# Patient Record
Sex: FEMALE | Race: BLACK OR AFRICAN AMERICAN | Employment: OTHER | URBAN - METROPOLITAN AREA
[De-identification: names, ages, dates, MRNs, and addresses within clinical notes are randomized per-mention and may not be internally consistent; named-entity substitution may affect disease eponyms.]

---

## 2017-06-07 ENCOUNTER — TELEPHONE (OUTPATIENT)
Dept: ORTHOPEDIC SURGERY | Facility: CLINIC | Age: 57
End: 2017-06-07

## 2017-06-12 ENCOUNTER — DOCUMENTATION ONLY (OUTPATIENT)
Dept: ORTHOPEDIC SURGERY | Facility: CLINIC | Age: 57
End: 2017-06-12
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
## 2019-01-24 ENCOUNTER — HOSPITAL ENCOUNTER (OUTPATIENT)
Dept: LAB | Age: 59
Discharge: HOME | End: 2019-01-24
Payer: MEDICARE

## 2019-01-24 ENCOUNTER — HOSPITAL ENCOUNTER (OUTPATIENT)
Dept: LAB | Age: 59
Discharge: HOME | End: 2019-01-24

## 2019-01-24 DIAGNOSIS — Z01.818 PRE-OP EVALUATION: ICD-10-CM

## 2019-01-24 LAB
ANION GAP SERPL CALC-SCNC: 8 MMOL/L (ref 3–18)
APTT PPP: 32 SEC (ref 23–36.4)
ATRIAL RATE: 96 BPM
BUN SERPL-MCNC: 22 MG/DL (ref 7–18)
BUN/CREAT SERPL: 17 (ref 12–20)
CALCIUM SERPL-MCNC: 9.7 MG/DL (ref 8.5–10.1)
CALCULATED P AXIS, ECG09: 60 DEGREES
CALCULATED R AXIS, ECG10: 1 DEGREES
CALCULATED T AXIS, ECG11: 17 DEGREES
CHLORIDE SERPL-SCNC: 103 MMOL/L (ref 100–108)
CO2 SERPL-SCNC: 27 MMOL/L (ref 21–32)
CREAT SERPL-MCNC: 1.27 MG/DL (ref 0.6–1.3)
DIAGNOSIS, 93000: NORMAL
ERYTHROCYTE [DISTWIDTH] IN BLOOD BY AUTOMATED COUNT: 13.5 % (ref 11.6–14.5)
GLUCOSE SERPL-MCNC: 86 MG/DL (ref 74–99)
HCT VFR BLD AUTO: 30.5 % (ref 35–45)
HGB BLD-MCNC: 9.7 G/DL (ref 12–16)
INR PPP: 1 (ref 0.8–1.2)
MCH RBC QN AUTO: 28.2 PG (ref 24–34)
MCHC RBC AUTO-ENTMCNC: 31.8 G/DL (ref 31–37)
MCV RBC AUTO: 88.7 FL (ref 74–97)
P-R INTERVAL, ECG05: 110 MS
PLATELET # BLD AUTO: 399 K/UL (ref 135–420)
PMV BLD AUTO: 11.6 FL (ref 9.2–11.8)
POTASSIUM SERPL-SCNC: 4.1 MMOL/L (ref 3.5–5.5)
PROTHROMBIN TIME: 12.9 SEC (ref 11.5–15.2)
Q-T INTERVAL, ECG07: 356 MS
QRS DURATION, ECG06: 84 MS
QTC CALCULATION (BEZET), ECG08: 449 MS
RBC # BLD AUTO: 3.44 M/UL (ref 4.2–5.3)
SODIUM SERPL-SCNC: 138 MMOL/L (ref 136–145)
VENTRICULAR RATE, ECG03: 96 BPM
WBC # BLD AUTO: 8.8 K/UL (ref 4.6–13.2)

## 2019-01-24 PROCEDURE — 85610 PROTHROMBIN TIME: CPT

## 2019-01-24 PROCEDURE — 93005 ELECTROCARDIOGRAM TRACING: CPT

## 2019-01-24 PROCEDURE — 36415 COLL VENOUS BLD VENIPUNCTURE: CPT

## 2019-01-24 PROCEDURE — 85730 THROMBOPLASTIN TIME PARTIAL: CPT

## 2019-01-24 PROCEDURE — 80048 BASIC METABOLIC PNL TOTAL CA: CPT

## 2019-01-24 PROCEDURE — 85027 COMPLETE CBC AUTOMATED: CPT

## 2019-01-25 RX ORDER — LANOLIN ALCOHOL/MO/W.PET/CERES
325 CREAM (GRAM) TOPICAL
COMMUNITY

## 2019-01-25 RX ORDER — HYDROCHLOROTHIAZIDE 25 MG/1
25 TABLET ORAL DAILY
COMMUNITY
End: 2022-08-19

## 2019-01-25 RX ORDER — AMLODIPINE BESYLATE 10 MG/1
10 TABLET ORAL DAILY
COMMUNITY

## 2019-01-25 RX ORDER — CYCLOBENZAPRINE HCL 10 MG
10 TABLET ORAL
COMMUNITY
End: 2022-10-20 | Stop reason: ALTCHOICE

## 2019-01-28 ENCOUNTER — ANESTHESIA EVENT (OUTPATIENT)
Dept: SURGERY | Age: 59
End: 2019-01-28
Payer: MEDICARE

## 2019-01-29 ENCOUNTER — ANESTHESIA (OUTPATIENT)
Dept: SURGERY | Age: 59
End: 2019-01-29
Payer: MEDICARE

## 2019-01-29 ENCOUNTER — APPOINTMENT (OUTPATIENT)
Dept: GENERAL RADIOLOGY | Age: 59
End: 2019-01-29
Attending: UROLOGY
Payer: MEDICARE

## 2019-01-29 ENCOUNTER — HOSPITAL ENCOUNTER (OUTPATIENT)
Age: 59
Setting detail: OBSERVATION
Discharge: HOME | End: 2019-01-30
Attending: UROLOGY | Admitting: UROLOGY
Payer: MEDICARE

## 2019-01-29 DIAGNOSIS — N20.0 KIDNEY STONE: Primary | ICD-10-CM

## 2019-01-29 PROCEDURE — 77030034696 HC CATH URETH FOL 2W BARD -A: Performed by: UROLOGY

## 2019-01-29 PROCEDURE — 74011250637 HC RX REV CODE- 250/637: Performed by: UROLOGY

## 2019-01-29 PROCEDURE — 77030032490 HC SLV COMPR SCD KNE COVD -B: Performed by: UROLOGY

## 2019-01-29 PROCEDURE — C2617 STENT, NON-COR, TEM W/O DEL: HCPCS | Performed by: UROLOGY

## 2019-01-29 PROCEDURE — 77030020268 HC MISC GENERAL SUPPLY: Performed by: UROLOGY

## 2019-01-29 PROCEDURE — 74011250636 HC RX REV CODE- 250/636: Performed by: UROLOGY

## 2019-01-29 PROCEDURE — 77030005537 HC CATH URETH BARD -A: Performed by: UROLOGY

## 2019-01-29 PROCEDURE — 77030012961 HC IRR KT CYSTO/TUR ICUM -A: Performed by: UROLOGY

## 2019-01-29 PROCEDURE — 74011250636 HC RX REV CODE- 250/636: Performed by: NURSE ANESTHETIST, CERTIFIED REGISTERED

## 2019-01-29 PROCEDURE — 74011636320 HC RX REV CODE- 636/320: Performed by: UROLOGY

## 2019-01-29 PROCEDURE — 99218 HC RM OBSERVATION: CPT

## 2019-01-29 PROCEDURE — 74011000250 HC RX REV CODE- 250: Performed by: NURSE ANESTHETIST, CERTIFIED REGISTERED

## 2019-01-29 PROCEDURE — 77030018836 HC SOL IRR NACL ICUM -A: Performed by: UROLOGY

## 2019-01-29 PROCEDURE — C1758 CATHETER, URETERAL: HCPCS | Performed by: UROLOGY

## 2019-01-29 PROCEDURE — 36415 COLL VENOUS BLD VENIPUNCTURE: CPT

## 2019-01-29 PROCEDURE — 76060000033 HC ANESTHESIA 1 TO 1.5 HR: Performed by: UROLOGY

## 2019-01-29 PROCEDURE — 76010000161 HC OR TIME 1 TO 1.5 HR INTENSV-TIER 1: Performed by: UROLOGY

## 2019-01-29 PROCEDURE — 74420 UROGRAPHY RTRGR +-KUB: CPT

## 2019-01-29 PROCEDURE — 74011250636 HC RX REV CODE- 250/636

## 2019-01-29 PROCEDURE — 77030020782 HC GWN BAIR PAWS FLX 3M -B: Performed by: UROLOGY

## 2019-01-29 PROCEDURE — C1894 INTRO/SHEATH, NON-LASER: HCPCS | Performed by: UROLOGY

## 2019-01-29 PROCEDURE — C1769 GUIDE WIRE: HCPCS | Performed by: UROLOGY

## 2019-01-29 PROCEDURE — 76210000006 HC OR PH I REC 0.5 TO 1 HR: Performed by: UROLOGY

## 2019-01-29 PROCEDURE — 77030006974 HC BSKT URET RTVR BSC -C: Performed by: UROLOGY

## 2019-01-29 PROCEDURE — 74011000250 HC RX REV CODE- 250

## 2019-01-29 PROCEDURE — 77030027138 HC INCENT SPIROMETER -A

## 2019-01-29 PROCEDURE — 82360 CALCULUS ASSAY QUANT: CPT

## 2019-01-29 DEVICE — URETERAL STENT
Type: IMPLANTABLE DEVICE | Site: URETER | Status: FUNCTIONAL
Brand: POLARIS™ ULTRA

## 2019-01-29 RX ORDER — MIDAZOLAM HYDROCHLORIDE 1 MG/ML
INJECTION, SOLUTION INTRAMUSCULAR; INTRAVENOUS AS NEEDED
Status: DISCONTINUED | OUTPATIENT
Start: 2019-01-29 | End: 2019-01-29 | Stop reason: HOSPADM

## 2019-01-29 RX ORDER — ACETAMINOPHEN 325 MG/1
650 TABLET ORAL
Status: DISCONTINUED | OUTPATIENT
Start: 2019-01-29 | End: 2019-01-30 | Stop reason: HOSPADM

## 2019-01-29 RX ORDER — ATORVASTATIN CALCIUM 20 MG/1
20 TABLET, FILM COATED ORAL
Status: DISCONTINUED | OUTPATIENT
Start: 2019-01-29 | End: 2019-01-30 | Stop reason: HOSPADM

## 2019-01-29 RX ORDER — PROMETHAZINE HYDROCHLORIDE 25 MG/ML
12.5 INJECTION, SOLUTION INTRAMUSCULAR; INTRAVENOUS AS NEEDED
Status: DISCONTINUED | OUTPATIENT
Start: 2019-01-29 | End: 2019-01-30 | Stop reason: HOSPADM

## 2019-01-29 RX ORDER — DIPHENHYDRAMINE HYDROCHLORIDE 50 MG/ML
12.5 INJECTION, SOLUTION INTRAMUSCULAR; INTRAVENOUS
Status: DISCONTINUED | OUTPATIENT
Start: 2019-01-29 | End: 2019-01-30 | Stop reason: HOSPADM

## 2019-01-29 RX ORDER — DEXAMETHASONE SODIUM PHOSPHATE 4 MG/ML
INJECTION, SOLUTION INTRA-ARTICULAR; INTRALESIONAL; INTRAMUSCULAR; INTRAVENOUS; SOFT TISSUE AS NEEDED
Status: DISCONTINUED | OUTPATIENT
Start: 2019-01-29 | End: 2019-01-29 | Stop reason: HOSPADM

## 2019-01-29 RX ORDER — LANOLIN ALCOHOL/MO/W.PET/CERES
325 CREAM (GRAM) TOPICAL
Status: DISCONTINUED | OUTPATIENT
Start: 2019-01-30 | End: 2019-01-30 | Stop reason: HOSPADM

## 2019-01-29 RX ORDER — SODIUM CHLORIDE 9 MG/ML
25 INJECTION, SOLUTION INTRAVENOUS CONTINUOUS
Status: DISCONTINUED | OUTPATIENT
Start: 2019-01-29 | End: 2019-01-30

## 2019-01-29 RX ORDER — ALBUTEROL SULFATE 0.83 MG/ML
2.5 SOLUTION RESPIRATORY (INHALATION) AS NEEDED
Status: DISCONTINUED | OUTPATIENT
Start: 2019-01-29 | End: 2019-01-30 | Stop reason: HOSPADM

## 2019-01-29 RX ORDER — FENTANYL CITRATE 50 UG/ML
INJECTION, SOLUTION INTRAMUSCULAR; INTRAVENOUS AS NEEDED
Status: DISCONTINUED | OUTPATIENT
Start: 2019-01-29 | End: 2019-01-29 | Stop reason: HOSPADM

## 2019-01-29 RX ORDER — CYCLOBENZAPRINE HCL 10 MG
10 TABLET ORAL
Status: DISCONTINUED | OUTPATIENT
Start: 2019-01-29 | End: 2019-01-30 | Stop reason: HOSPADM

## 2019-01-29 RX ORDER — SODIUM CHLORIDE 9 MG/ML
75 INJECTION, SOLUTION INTRAVENOUS CONTINUOUS
Status: DISCONTINUED | OUTPATIENT
Start: 2019-01-29 | End: 2019-01-29 | Stop reason: HOSPADM

## 2019-01-29 RX ORDER — AMLODIPINE BESYLATE 10 MG/1
10 TABLET ORAL DAILY
Status: DISCONTINUED | OUTPATIENT
Start: 2019-01-30 | End: 2019-01-30 | Stop reason: HOSPADM

## 2019-01-29 RX ORDER — AMOXICILLIN 250 MG
2 CAPSULE ORAL DAILY
Status: DISCONTINUED | OUTPATIENT
Start: 2019-01-30 | End: 2019-01-30 | Stop reason: HOSPADM

## 2019-01-29 RX ORDER — SUCCINYLCHOLINE CHLORIDE 20 MG/ML
INJECTION INTRAMUSCULAR; INTRAVENOUS AS NEEDED
Status: DISCONTINUED | OUTPATIENT
Start: 2019-01-29 | End: 2019-01-29 | Stop reason: HOSPADM

## 2019-01-29 RX ORDER — LIDOCAINE HYDROCHLORIDE 20 MG/ML
INJECTION, SOLUTION EPIDURAL; INFILTRATION; INTRACAUDAL; PERINEURAL AS NEEDED
Status: DISCONTINUED | OUTPATIENT
Start: 2019-01-29 | End: 2019-01-29 | Stop reason: HOSPADM

## 2019-01-29 RX ORDER — SODIUM CHLORIDE 0.9 % (FLUSH) 0.9 %
5-40 SYRINGE (ML) INJECTION EVERY 8 HOURS
Status: DISCONTINUED | OUTPATIENT
Start: 2019-01-29 | End: 2019-01-29 | Stop reason: HOSPADM

## 2019-01-29 RX ORDER — NALOXONE HYDROCHLORIDE 0.4 MG/ML
0.04 INJECTION, SOLUTION INTRAMUSCULAR; INTRAVENOUS; SUBCUTANEOUS AS NEEDED
Status: DISCONTINUED | OUTPATIENT
Start: 2019-01-29 | End: 2019-01-30 | Stop reason: HOSPADM

## 2019-01-29 RX ORDER — NITROFURANTOIN 25; 75 MG/1; MG/1
100 CAPSULE ORAL 2 TIMES DAILY
Qty: 14 CAP | Refills: 0 | Status: SHIPPED | OUTPATIENT
Start: 2019-01-29 | End: 2019-02-05

## 2019-01-29 RX ORDER — HYDROCHLOROTHIAZIDE 25 MG/1
25 TABLET ORAL DAILY
Status: DISCONTINUED | OUTPATIENT
Start: 2019-01-30 | End: 2019-01-30 | Stop reason: HOSPADM

## 2019-01-29 RX ORDER — ROCURONIUM BROMIDE 10 MG/ML
INJECTION, SOLUTION INTRAVENOUS AS NEEDED
Status: DISCONTINUED | OUTPATIENT
Start: 2019-01-29 | End: 2019-01-29 | Stop reason: HOSPADM

## 2019-01-29 RX ORDER — LORAZEPAM 2 MG/ML
1 INJECTION INTRAMUSCULAR
Status: DISCONTINUED | OUTPATIENT
Start: 2019-01-29 | End: 2019-01-30 | Stop reason: HOSPADM

## 2019-01-29 RX ORDER — SODIUM CHLORIDE 0.9 % (FLUSH) 0.9 %
5-40 SYRINGE (ML) INJECTION AS NEEDED
Status: DISCONTINUED | OUTPATIENT
Start: 2019-01-29 | End: 2019-01-30 | Stop reason: HOSPADM

## 2019-01-29 RX ORDER — ONDANSETRON 2 MG/ML
4 INJECTION INTRAMUSCULAR; INTRAVENOUS ONCE
Status: ACTIVE | OUTPATIENT
Start: 2019-01-29 | End: 2019-01-30

## 2019-01-29 RX ORDER — ONDANSETRON 2 MG/ML
4 INJECTION INTRAMUSCULAR; INTRAVENOUS
Status: DISCONTINUED | OUTPATIENT
Start: 2019-01-29 | End: 2019-01-30 | Stop reason: HOSPADM

## 2019-01-29 RX ORDER — SODIUM CHLORIDE 0.9 % (FLUSH) 0.9 %
5-40 SYRINGE (ML) INJECTION AS NEEDED
Status: DISCONTINUED | OUTPATIENT
Start: 2019-01-29 | End: 2019-01-29 | Stop reason: HOSPADM

## 2019-01-29 RX ORDER — HEPARIN SODIUM 5000 [USP'U]/ML
5000 INJECTION, SOLUTION INTRAVENOUS; SUBCUTANEOUS EVERY 8 HOURS
Status: DISCONTINUED | OUTPATIENT
Start: 2019-01-29 | End: 2019-01-30 | Stop reason: HOSPADM

## 2019-01-29 RX ORDER — ESMOLOL HYDROCHLORIDE 10 MG/ML
INJECTION INTRAVENOUS AS NEEDED
Status: DISCONTINUED | OUTPATIENT
Start: 2019-01-29 | End: 2019-01-29 | Stop reason: HOSPADM

## 2019-01-29 RX ORDER — OXYBUTYNIN CHLORIDE 5 MG/1
5 TABLET ORAL
Status: DISCONTINUED | OUTPATIENT
Start: 2019-01-29 | End: 2019-01-30 | Stop reason: HOSPADM

## 2019-01-29 RX ORDER — GLYCOPYRROLATE 0.2 MG/ML
INJECTION INTRAMUSCULAR; INTRAVENOUS AS NEEDED
Status: DISCONTINUED | OUTPATIENT
Start: 2019-01-29 | End: 2019-01-29 | Stop reason: HOSPADM

## 2019-01-29 RX ORDER — PROPOFOL 10 MG/ML
INJECTION, EMULSION INTRAVENOUS AS NEEDED
Status: DISCONTINUED | OUTPATIENT
Start: 2019-01-29 | End: 2019-01-29 | Stop reason: HOSPADM

## 2019-01-29 RX ORDER — SODIUM CHLORIDE 0.9 % (FLUSH) 0.9 %
5-40 SYRINGE (ML) INJECTION EVERY 8 HOURS
Status: DISCONTINUED | OUTPATIENT
Start: 2019-01-29 | End: 2019-01-30 | Stop reason: HOSPADM

## 2019-01-29 RX ORDER — OXYCODONE AND ACETAMINOPHEN 5; 325 MG/1; MG/1
1 TABLET ORAL
Qty: 10 TAB | Refills: 0 | Status: SHIPPED | OUTPATIENT
Start: 2019-01-29 | End: 2022-08-19

## 2019-01-29 RX ORDER — ONDANSETRON 2 MG/ML
INJECTION INTRAMUSCULAR; INTRAVENOUS AS NEEDED
Status: DISCONTINUED | OUTPATIENT
Start: 2019-01-29 | End: 2019-01-29 | Stop reason: HOSPADM

## 2019-01-29 RX ORDER — HYDROMORPHONE HYDROCHLORIDE 2 MG/ML
0.5 INJECTION, SOLUTION INTRAMUSCULAR; INTRAVENOUS; SUBCUTANEOUS
Status: DISCONTINUED | OUTPATIENT
Start: 2019-01-29 | End: 2019-01-30 | Stop reason: HOSPADM

## 2019-01-29 RX ADMIN — FENTANYL CITRATE 100 MCG: 50 INJECTION, SOLUTION INTRAMUSCULAR; INTRAVENOUS at 15:00

## 2019-01-29 RX ADMIN — DEXAMETHASONE SODIUM PHOSPHATE 4 MG: 4 INJECTION, SOLUTION INTRA-ARTICULAR; INTRALESIONAL; INTRAMUSCULAR; INTRAVENOUS; SOFT TISSUE at 15:00

## 2019-01-29 RX ADMIN — PROPOFOL 200 MG: 10 INJECTION, EMULSION INTRAVENOUS at 15:00

## 2019-01-29 RX ADMIN — SUCCINYLCHOLINE CHLORIDE 160 MG: 20 INJECTION INTRAMUSCULAR; INTRAVENOUS at 15:00

## 2019-01-29 RX ADMIN — HEPARIN SODIUM 5000 UNITS: 5000 INJECTION INTRAVENOUS; SUBCUTANEOUS at 18:23

## 2019-01-29 RX ADMIN — HYDROMORPHONE HYDROCHLORIDE 0.5 MG: 2 INJECTION INTRAMUSCULAR; INTRAVENOUS; SUBCUTANEOUS at 18:14

## 2019-01-29 RX ADMIN — ATORVASTATIN CALCIUM 20 MG: 20 TABLET, FILM COATED ORAL at 20:26

## 2019-01-29 RX ADMIN — FENTANYL CITRATE 50 MCG: 50 INJECTION, SOLUTION INTRAMUSCULAR; INTRAVENOUS at 15:18

## 2019-01-29 RX ADMIN — ACETAMINOPHEN 650 MG: 325 TABLET ORAL at 22:42

## 2019-01-29 RX ADMIN — FENTANYL CITRATE 50 MCG: 50 INJECTION, SOLUTION INTRAMUSCULAR; INTRAVENOUS at 16:08

## 2019-01-29 RX ADMIN — ONDANSETRON 4 MG: 2 INJECTION INTRAMUSCULAR; INTRAVENOUS at 14:53

## 2019-01-29 RX ADMIN — MIDAZOLAM HYDROCHLORIDE 2 MG: 1 INJECTION, SOLUTION INTRAMUSCULAR; INTRAVENOUS at 14:53

## 2019-01-29 RX ADMIN — FAMOTIDINE 20 MG: 10 INJECTION INTRAVENOUS at 12:21

## 2019-01-29 RX ADMIN — ESMOLOL HYDROCHLORIDE 20 MG: 10 INJECTION INTRAVENOUS at 15:19

## 2019-01-29 RX ADMIN — SODIUM CHLORIDE 75 ML/HR: 900 INJECTION, SOLUTION INTRAVENOUS at 12:21

## 2019-01-29 RX ADMIN — ROCURONIUM BROMIDE 5 MG: 10 INJECTION, SOLUTION INTRAVENOUS at 15:00

## 2019-01-29 RX ADMIN — GLYCOPYRROLATE 0.2 MG: 0.2 INJECTION INTRAMUSCULAR; INTRAVENOUS at 14:53

## 2019-01-29 RX ADMIN — ROCURONIUM BROMIDE 15 MG: 10 INJECTION, SOLUTION INTRAVENOUS at 15:05

## 2019-01-29 RX ADMIN — FENTANYL CITRATE 50 MCG: 50 INJECTION, SOLUTION INTRAMUSCULAR; INTRAVENOUS at 15:49

## 2019-01-29 RX ADMIN — LIDOCAINE HYDROCHLORIDE 100 MG: 20 INJECTION, SOLUTION EPIDURAL; INFILTRATION; INTRACAUDAL; PERINEURAL at 15:00

## 2019-01-29 NOTE — DISCHARGE SUMMARY
Discharge Instructions      Patient: Mildred Barrera               Sex: female          DOA: 1/29/2019         YOB: 1960      Age:  62 y.o.        LOS:  LOS: 0 days     ACUTE DIAGNOSES:  Nephrolithiasis     CHRONIC MEDICAL DIAGNOSES:  Problem List as of 1/29/2019 Date Reviewed: 1/6/2019          Codes Class Noted - Resolved    Kidney stone ICD-10-CM: N20.0  ICD-9-CM: 592.0  1/29/2019 - Present        Nephrolithiasis ICD-10-CM: N20.0  ICD-9-CM: 592.0  1/29/2019 - Present        Right shoulder pain ICD-10-CM: M25.511  ICD-9-CM: 719.41  Unknown - Present        Benign hypertension ICD-10-CM: I10  ICD-9-CM: 401.1  10/24/2012 - Present        Pure hypercholesterolemia ICD-10-CM: E78.00  ICD-9-CM: 272.0  10/24/2012 - Present        Displacement 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 ICD-10-CM: M50.20  ICD-9-CM: 722.0  10/24/2012 - Present        Degeneration of lumbar or lumbosacral intervertebral disc ICD-10-CM: M51.37  ICD-9-CM: 722.52  10/24/2012 - Present        Depressive disorder, not elsewhere classified ICD-10-CM: F32.9  ICD-9-CM: 581  10/24/2012 - Present        Brachial neuritis or radiculitis NOS ICD-10-CM: M54.12  ICD-9-CM: 723.4  10/24/2012 - Present        Spondylosis of unspecified site without mention of myelopathy ICD-10-CM: M47.9  ICD-9-CM: 721.90  10/24/2012 - Present        Allergic rhinitis ICD-10-CM: J30.9  ICD-9-CM: 477.9  10/24/2012 - Present        Carpal tunnel syndrome ICD-10-CM: G56.00  ICD-9-CM: 354.0  10/24/2012 - Present        Headache(784.0) ICD-10-CM: R51  ICD-9-CM: 784.0  10/24/2012 - Present        Pain in soft tissues of limb ICD-10-CM: M79.609  ICD-9-CM: 729.5  10/24/2012 - Present        Lumbago ICD-10-CM: M54.5  ICD-9-CM: 724.2  10/24/2012 - Present        Allergy, unspecified not elsewhere classified ICD-10-CM: T78.40XA  ICD-9-CM: 995.3  10/24/2012 - Present        Insomnia, unspecified ICD-10-CM: G47.00  ICD-9-CM: 780.52  10/24/2012 - Present        Migraine, unspecified, without mention of intractable migraine without mention of status migrainosus ICD-10-CM: H13.216  ICD-9-CM: 346.90  10/24/2012 - Present        Osteoarthritis of leg ICD-10-CM: [id number]  ICD-9-CM: 715.96  10/24/2012 - Present    Overview Signed 10/24/2012  9:23 AM by Adonay Lang     left             Pain, joint, lower leg, left ICD-10-CM: M25.562  ICD-9-CM: 719.46  10/24/2012 - Present        Other tenosynovitis of hand and wrist ICD-10-CM: M65.849, M65.839  ICD-9-CM: 727.05  10/24/2012 - Present        Esophageal reflux ICD-10-CM: K21.9  ICD-9-CM: 530.81  10/24/2012 - Present        Unspecified vitamin D deficiency ICD-10-CM: E55.9  ICD-9-CM: 268.9  10/24/2012 - Present        Disorder of bone and cartilage, unspecified ICD-10-CM: M89.9, M94.9  ICD-9-CM: 733.90  10/24/2012 - Present        Thoracic or lumbosacral neuritis or radiculitis, unspecified ICD-10-CM: [id number]  ICD-9-CM: 724.4  10/24/2012 - Present              ACTIVITY: No restrictions     ADDITIONAL INFORMATION:   You have indwelling ureteral stents which frequently causes slight discomfort in the flank region and bladder spasms. You can take flomax as needed for flank discomfort or Ditropan for bladder spasms. The indwelling stent will need to be removed/exchanged as directed below. If the stent is left in place without appropriate follow-up, it may become encrusted with stone and/or infected. If that occurs, you will require multiple additional surgeries to fix this. It is very important you follow up for appropriate removal or exchange of ureteral stents. If you experience any fevers > 100.4, significant nausea/vomiting, or significant worsening of pain, please contact us at the number below. It is common to experience mild, recurrent blood in your urine and this is likely due to stent irritation. The general trend should be decreasing bleeding with occasional flares due to increased activity.   If the bleeding continues to worsen with passage of clots, you are unable to urinate, or you experience significant light-headedness, please contact us at the number below. FOLLOW UP CARE:  You have a return appt on Monday for ureteral stent removal by nursing staff.

## 2019-01-29 NOTE — PERIOP NOTES
TRANSFER - OUT REPORT:    Verbal report given to [facility] on Annabella Espinal  being transferred to [facility] for routine post - op       Report consisted of patients Situation, Background, Assessment and   Recommendations(SBAR). Information from the following report(s) SBAR, OR Summary, Procedure Summary, Intake/Output, MAR and Recent Results was reviewed with the receiving nurse. Lines:   Peripheral IV 01/29/19 Left;Posterior Hand (Active)   Site Assessment Clean, dry, & intact 1/29/2019 12:21 PM   Phlebitis Assessment 0 1/29/2019 12:21 PM   Infiltration Assessment 0 1/29/2019 12:21 PM   Dressing Status Clean, dry, & intact 1/29/2019 12:21 PM   Dressing Type Tape;Transparent 1/29/2019 12:21 PM   Hub Color/Line Status Pink; Infusing 1/29/2019 12:21 PM        Opportunity for questions and clarification was provided.       Patient transported with:   Registered Nurse

## 2019-01-29 NOTE — ANESTHESIA PREPROCEDURE EVALUATION
Anesthetic History No history of anesthetic complications Comments: Hx of cauda equina syndrome after spinal in Dec 2018 Review of Systems / Medical History Patient summary reviewed, nursing notes reviewed and pertinent labs reviewed Pulmonary Within defined limits Neuro/Psych Psychiatric history Cardiovascular Hypertension Exercise tolerance: >4 METS 
  
GI/Hepatic/Renal 
  
GERD Endo/Other Arthritis Other Findings Physical Exam 
 
Airway Mallampati: II 
TM Distance: 4 - 6 cm Neck ROM: normal range of motion Mouth opening: Normal 
 
 Cardiovascular Rhythm: regular Rate: normal 
 
 
 
 Dental 
No notable dental hx Pulmonary Breath sounds clear to auscultation Abdominal 
GI exam deferred Other Findings Anesthetic Plan ASA: 2 Anesthesia type: general 
 
 
 
 
Induction: Intravenous Anesthetic plan and risks discussed with: Patient

## 2019-01-29 NOTE — ANESTHESIA POSTPROCEDURE EVALUATION
Procedure(s): 
CYSTOSCOPY/RIGHT URETEROSCOPY/100W HOLMIUM LASER LITHOTRIPSY/STONE BASKET EXTRACTION/DOUBLE J STENT/C-ARM. Anesthesia Post Evaluation Multimodal analgesia: multimodal analgesia used between 6 hours prior to anesthesia start to PACU discharge Patient location during evaluation: bedside Patient participation: complete - patient participated Level of consciousness: awake Pain management: adequate Airway patency: patent Anesthetic complications: no 
Cardiovascular status: stable Respiratory status: acceptable Hydration status: acceptable Post anesthesia nausea and vomiting:  controlled Visit Vitals /80 Pulse (!) 102 Temp (P) 36.7 °C (98 °F) Resp 20 Ht 5' 4.5\" (1.638 m) Wt 85.7 kg (189 lb) SpO2 98% BMI 31.94 kg/m²

## 2019-01-29 NOTE — OP NOTES
Operative Note  Patient: Coy Weldon               Sex: female             MRN: [id number]  YOB: 1960      Age:  62 y.o. Preoperative Diagnosis: Kidney stones [N20.0]  Postoperative Diagnosis:  Kidney stones [N20.0]  Surgeon: Johanna Shirley     Indication: This is a 63 y/o woman with infected right stone s/p stent placement here today for ureteroscopic extraction. She has known cauda equina syndrome and chronic retention, currently managed with thurston catheter. Preop culture was treated appropriately. Procedure:    1) Cystoscopy  2) Retrograde pyelogram with interpretation  3) < 1 hour physician fluoroscopy imaging interpretation time  4) Right side Ureteroscopy, laser lithotripsy and stone extraction   5) JJ ureteral stent exchange      Findings:    1). Normal cystoscopy   2). Right Retrograde pyelogram without evidence of hydronephrosis or filling defects  3). Total stone burden 9mm   4). 7x24 JJ stent exchanged on string      Narrative of events: The patient was brought to the operative suite. Anesthesia was induced and preoperative antibiotics were administered. They were then placed in the dorsal lithotomy position and their external genitalia was prepped and draped in the usual fashion. A surgical timeout was performed confirming the patient's name, date of birth, laterality, and antibiotics. All were in agreement. A 22 Arabic cystourethroscope was then inserted into the patients bladder. The urethra revealed no abnormalities. Thorough cystoscopy was performed with both the 30 degree and 70 degree lens. The right  ureteral orifice had a stent which was grasped and brought out to the urethral meatus. This was cannulated with a 0.035 sensor tip wire and confirmed in the renal pelvis. A rigid scope was advanced into the ureter and the distal stone was identified and laser lithotripsy was performed. Basket extraction was then performed with removal of all visible stone. The ureter was completely cleared. A duel lumen was advanced over the wire and retrograde pyelogram was performed no evidence of hydronephrosis or filling defects. Second wire was advanced and duel lumen was removed. Ureteral access sheath was advanced to the UPJ under direct fluoroscopic guidance and flexible ureteroscopy was performed. Thorough pyeloscopy was again performed to ensure no residual fragments. The sheath was removed slowly and the entire ureter was well visualized without evidence of residual stones or injury. A stent was advanced over the wire and deployed using fluoroscopic technique with the string left in place. A thurston catheter was replaced and the patient was awoken from anesthesia. She tolerated the procedure well. Estimated Blood Loss: <5CC     Anesthesia:  General                  Implants:   Implant Name Type Inv. Item Serial No.  Lot No. LRB No. Used Action   STENT URET POLARIS 7FR Pablo Pollock - [id number]  Pop Shayne POLARIS 7FR 24CM -- [id number] [id number] Right 1 Implanted       Specimens:   ID Type Source Tests Collected by Time Destination   1 : Kidney stone analysis Fresh Kidney  Lan Beasley MD 1/29/2019  3:31 PM Pathology        Drains: 7x24 JJ stent on string            Complications:  None           Plan:   1. Observation until patient meeting criteria for discharge   2. Macrobid while stent is in place  3. Return Monday for nursing visit for stent removal  4.  Follow up with Dr. Stephanie Goddard for UDS and discussion of Cami Sherman MD  1/29/2019

## 2019-01-29 NOTE — INTERVAL H&P NOTE
H&P Update:  Kiesha HUFFMAN MedinaChanningt was seen and examined. History and physical has been reviewed. The patient has been examined.  There have been no significant clinical changes since the completion of the originally dated History and Physical.    Signed By: Diana Lopez MD     January 29, 2019 2:18 PM

## 2019-01-30 VITALS
BODY MASS INDEX: 31.49 KG/M2 | WEIGHT: 189 LBS | OXYGEN SATURATION: 100 % | SYSTOLIC BLOOD PRESSURE: 128 MMHG | HEIGHT: 65 IN | DIASTOLIC BLOOD PRESSURE: 83 MMHG | TEMPERATURE: 99.2 F | RESPIRATION RATE: 19 BRPM | HEART RATE: 95 BPM

## 2019-01-30 PROCEDURE — 99218 HC RM OBSERVATION: CPT

## 2019-01-30 PROCEDURE — 74011250637 HC RX REV CODE- 250/637: Performed by: UROLOGY

## 2019-01-30 PROCEDURE — 74011250636 HC RX REV CODE- 250/636: Performed by: UROLOGY

## 2019-01-30 RX ADMIN — HEPARIN SODIUM 5000 UNITS: 5000 INJECTION INTRAVENOUS; SUBCUTANEOUS at 08:36

## 2019-01-30 RX ADMIN — HYDROCHLOROTHIAZIDE 25 MG: 25 TABLET ORAL at 08:34

## 2019-01-30 RX ADMIN — SENNOSIDES AND DOCUSATE SODIUM 2 TABLET: 8.6; 5 TABLET ORAL at 08:34

## 2019-01-30 RX ADMIN — AMLODIPINE BESYLATE 10 MG: 10 TABLET ORAL at 08:34

## 2019-01-30 RX ADMIN — HEPARIN SODIUM 5000 UNITS: 5000 INJECTION INTRAVENOUS; SUBCUTANEOUS at 00:38

## 2019-01-30 RX ADMIN — FERROUS SULFATE TAB 325 MG (65 MG ELEMENTAL FE) 325 MG: 325 (65 FE) TAB at 08:34

## 2019-01-30 RX ADMIN — Medication 10 ML: at 08:36

## 2019-01-30 RX ADMIN — Medication 5 ML: at 00:42

## 2019-01-30 NOTE — PROGRESS NOTES
Reason for Admission:  Kidney stones [N20.0]                 RRAT Score:    7           Plan for utilizing home health:    Pt is active with [facility]. Will need resumption of care orders. Likelihood of Readmission:   LOW                         Transition of Care Plan:              Initial assessment completed with patient. Cognitive status of patient: oriented to time, place, person and situation. Face sheet information confirmed:  yes. The patient designates Jaquelin banks ([phone]) to participate in his/her discharge plan and to receive any needed information. This patient lives in a single family home with 2 steps to enter. Patient is able to navigate steps as needed. Prior to hospitalization, patient was considered to be independent with ADLs/IADLS : yes . Patient has a current ACP document on file: no  The  son will be available to transport patient home upon discharge. The patient already has Di [facility]  medical equipment available in the home. Patient is currently active with home health. If active, agency name is [facility]. Patient has not stayed in a skilled nursing facility or rehab. This patient is on dialysis :no    List of available Home Health agencies were provided and reviewed with the patient prior to discharge. Freedom of choice signed: yes, for [facility]. Will need orders to resume [place] care. Currently, the discharge plan is Home with  [facility]. The patient states that she can obtain her medications from the pharmacy, and take her medications as directed.     Patient's current insurance is [employer] Medicare part [place] [id number] Management Interventions  PCP Verified by CM: Yes(Per pt, saw PCP 2 weeks ago.)  Mode of Transport at Discharge: Self  Transition of Care Consult (CM Consult): Discharge Planning  MyChart Signup: No  Discharge Durable Medical Equipment: No  Physical Therapy Consult: No  Occupational Therapy Consult: No  Speech Therapy Consult: No  Current Support Network: Lives Alone  Confirm Follow Up Transport: Family  Plan discussed with Pt/Family/Caregiver: Yes  Freedom of Choice Offered: Yes  Discharge Location  Discharge Placement: Home with home health        NATALY Marie, RN  Pager # [phone]  Care Manager

## 2019-01-30 NOTE — PROGRESS NOTES
Observation notice provided in writing to patient and/or caregiver as well as verbal explanation of the policy. Patients who are in outpatient status also receive the Observation notice. Original document signed and place in chart.     NATALY Mathew, RN  Pager # [phone]  Care Manager

## 2019-01-30 NOTE — ROUTINE PROCESS
Patient is discharged stable with instructions thurston cath connected to leg bag.patient was accompanied by son.

## 2019-01-30 NOTE — ROUTINE PROCESS
SBAR/Bedside report including kardex,MAR and resent labs given to [address] [place] [address].

## 2019-01-30 NOTE — PROGRESS NOTES
Problem: Falls - Risk of  Goal: *Absence of Falls  Document Kareem Fall Risk and appropriate interventions in the flowsheet.   Outcome: Progressing Towards Goal  Fall Risk Interventions:  Mobility Interventions: Patient to call before getting OOB, Utilize walker, cane, or other assistive device         Medication Interventions: Patient to call before getting OOB, Teach patient to arise slowly    Elimination Interventions: Call light in reach, Patient to call for help with toileting needs

## 2019-01-30 NOTE — PROGRESS NOTES
conducted an initial consultation and Spiritual Assessment for Kiesha Espinal, who is a 62 y.o.,female. Patients Primary Language is: [place]. According to the patients EMR Gnosticism Affiliation is: Evangelical.     The reason the Patient came to the hospital is:   Patient Active Problem List    Diagnosis Date Noted    Kidney stone 01/29/2019    Nephrolithiasis 01/29/2019    Right shoulder pain     Benign hypertension 10/24/2012    Pure hypercholesterolemia 10/24/2012    Displacement 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 10/24/2012    Degeneration of lumbar or lumbosacral intervertebral disc 10/24/2012    Depressive disorder, not elsewhere classified 10/24/2012    Brachial neuritis or radiculitis NOS 10/24/2012    Spondylosis of unspecified site without mention of myelopathy 10/24/2012    Allergic rhinitis 10/24/2012    Carpal tunnel syndrome 10/24/2012    Headache(784.0) 10/24/2012    Pain in soft tissues of limb 10/24/2012    Lumbago 10/24/2012    Allergy, unspecified not elsewhere classified 10/24/2012    Insomnia, unspecified 10/24/2012    Migraine, unspecified, without mention of intractable migraine without mention of status migrainosus 10/24/2012    Osteoarthritis of leg 10/24/2012    Pain, joint, lower leg, left 10/24/2012    Other tenosynovitis of hand and wrist 10/24/2012    Esophageal reflux 10/24/2012    Unspecified vitamin D deficiency 10/24/2012    Disorder of bone and cartilage, unspecified 10/24/2012    Thoracic or lumbosacral neuritis or radiculitis, unspecified 10/24/2012        The  provided the following Interventions:  Initiated a relationship of care and support. Provided information about Spiritual Care Services. Chart reviewed. The following outcomes where achieved:  Patient expressed gratitude for 's visit.     Assessment:  Patient does not have any Druze/cultural needs that will affect patients preferences in health care. There are no spiritual or Yazidism issues which require intervention at this time. Plan:  Chaplains will continue to follow and will provide pastoral care on an as needed/requested basis.  recommends bedside caregivers page  on duty if patient shows signs of acute spiritual or emotional distress.     [address]  ([phone])

## 2019-01-30 NOTE — DISCHARGE INSTRUCTIONS
Patient Education        Kidney Stone: Care Instructions  Your Care Instructions    Kidney stones are formed when salts, minerals, and other substances normally found in the urine clump together. They can be as small as grains of sand or, rarely, as large as golf balls. While the stone is traveling through the ureter, which is the tube that carries urine from the kidney to the bladder, you will probably feel pain. The pain may be mild or very severe. You may also have some blood in your urine. As soon as the stone reaches the bladder, any intense pain should go away. If a stone is too large to pass on its own, you may need a medical procedure to help you pass the stone. The doctor has checked you carefully, but problems can develop later. If you notice any problems or new symptoms, get medical treatment right away. Follow-up care is a key part of your treatment and safety. Be sure to make and go to all appointments, and call your doctor if you are having problems. It's also a good idea to know your test results and keep a list of the medicines you take. How can you care for yourself at home? · Drink plenty of fluids, enough so that your urine is light yellow or clear like water. If you have kidney, heart, or liver disease and have to limit fluids, talk with your doctor before you increase the amount of fluids you drink. · Take pain medicines exactly as directed. Call your doctor if you think you are having a problem with your medicine. ? If the doctor gave you a prescription medicine for pain, take it as prescribed. ? If you are not taking a prescription pain medicine, ask your doctor if you can take an over-the-counter medicine. Read and follow all instructions on the label. · Your doctor may ask you to strain your urine so that you can collect your kidney stone when it passes. You can use a kitchen strainer or a tea strainer to catch the stone.  Store it in a plastic bag until you see your doctor again.  Preventing future kidney stones  Some changes in your diet may help prevent kidney stones. Depending on the cause of your stones, your doctor may recommend that you:  · Drink plenty of fluids, enough so that your urine is light yellow or clear like water. If you have kidney, heart, or liver disease and have to limit fluids, talk with your doctor before you increase the amount of fluids you drink. · Limit coffee, tea, and alcohol. Also avoid grapefruit juice. · Do not take more than the recommended daily dose of vitamins C and D.  · Avoid antacids such as Gaviscon, Maalox, Mylanta, or Tums. · Limit the amount of salt (sodium) in your diet. · Eat a balanced diet that is not too high in protein. · Limit foods that are high in a substance called oxalate, which can cause kidney stones. These foods include dark green vegetables, rhubarb, chocolate, wheat bran, nuts, cranberries, and beans. When should you call for help? Call your doctor now or seek immediate medical care if:    · You cannot keep down fluids.     · Your pain gets worse.     · You have a fever or chills.     · You have new or worse pain in your back just below your rib cage (the flank area).     · You have new or more blood in your urine.    Watch closely for changes in your health, and be sure to contact your doctor if:    · You do not get better as expected. Where can you learn more? Go to http://macey-evita.info/. Enter G329 in the search box to learn more about \"Kidney Stone: Care Instructions. \"  Current as of: March 14, 2018  Content Version: 11.9  © 2204-2708 "[employer]". Care instructions adapted under license by [employer] (which disclaims liability or warranty for this information).  If you have questions about a medical condition or this instruction, always ask your healthcare professional. [address] any warranty or liability for your use of this information. Patient Education        Learning About Diet for Kidney Stone Prevention  What are kidney stones? Kidney stones are made of salts and minerals in the urine that form small \"jemima. \" Stones can form in the kidneys and the ureters (the tubes that lead from the kidneys to the bladder). They can also form in the bladder. Stones may not cause a problem as long as they stay in the kidneys. But they can cause sudden, severe pain. Pain is most likely when the stones travel from the kidneys to the bladder. Kidney stones can cause bloody urine. Kidney stones often run in families. You are more likely to get them if you don't drink enough fluids, mainly water. Certain foods and drinks and some dietary supplements may also increase your risk for kidney stones if you consume too much of them. What can you do to prevent kidney stones? Changing what you eat may not prevent all types of kidney stones. But for people who have a history of certain kinds of kidney stones, some changes in diet may help. A dietitian can help you set up a meal plan that includes healthy, low-oxalate choices. Here are some general guidelines to get you started. Plan your meals and snacks around foods that are low in oxalate. These foods include:  · Corn, kale, parsnips, and squash,. · Beef, chicken, pork, turkey, and fish. · Milk, butter, cheese, and yogurt. You can eat certain foods that are medium-high in oxalate, but eat them only once in a while. These foods include:  · Bread. · Brown rice. · English muffins. · Figs. · Popcorn. · String beans. · Tomatoes. Limit very high-oxalate foods, including:  · Black tea. · Coffee. · Chocolate. · Dark green vegetables. · Nuts. Here are some other things you can do to help prevent kidney stones. · Drink plenty of fluids. If you have kidney, heart, or liver disease and have to limit fluids, talk with your doctor before you increase the amount of fluids you drink.   · Do not take more than the recommended daily dose of vitamins C and D.  · Limit the salt in your diet. · Eat a balanced diet that is not too high in protein. Follow-up care is a key part of your treatment and safety. Be sure to make and go to all appointments, and call your doctor if you are having problems. It's also a good idea to know your test results and keep a list of the medicines you take. Where can you learn more? Go to http://macey-evita.info/. Enter C138 in the search box to learn more about \"Learning About Diet for Kidney Stone Prevention. \"  Current as of: March 28, 2018  Content Version: 11.9  © 6311-6650 [employer]. Care instructions adapted under license by [email or web address] (which disclaims liability or warranty for this information). If you have questions about a medical condition or this instruction, always ask your healthcare professional. [place] [address] any warranty or liability for your use of this information. Patient Education        Lithotripsy: What to Expect at Home  Your Recovery    Lithotripsy is a way to treat kidney stones without surgery. It is also called extracorporeal shock wave lithotripsy, or ESWL. This treatment uses sound waves to break kidney stones into tiny pieces. These pieces can then pass out of the body in the urine. You may have a small amount of blood in your urine after this treatment. Your urine may be slightly pink or reddish. The blood in the urine often goes away after 2 days. You may have a plastic tube inside one of your ureters. Ureters are the tubes that connect the kidneys to the bladder. The plastic tube is called a stent. It takes urine from your kidney to your bladder. This lets the stone pass more easily. Your doctor may remove the stent in about a week or two. This care sheet gives you a general idea about how long it will take for you to recover.  But each person recovers at a different pace. Follow the steps below to feel better as quickly as possible. How can you care for yourself at home? Activity    · Rest as much as you need to after you go home.     · You may do your regular activities. But avoid hard exercise or sports for a week. Wait until there is no blood in your urine and the stent is out. Diet    · You can eat your normal diet.     · Drink plenty of fluids, enough so that your urine is light yellow or clear like water. If you have kidney, heart, or liver disease and have to limit fluids, talk with your doctor before you increase the amount of fluids you drink. Medicines    · Your doctor will tell you if and when you can restart your medicines. He or she will also give you instructions about taking any new medicines.     · If you take blood thinners, such as warfarin (Coumadin), clopidogrel (Plavix), or aspirin, be sure to talk to your doctor. He or she will tell you if and when to start taking those medicines again. Make sure that you understand exactly what your doctor wants you to do.     · Be safe with medicines. Read and follow all instructions on the label. ? If the doctor gave you a prescription medicine for pain, take it as prescribed. ? If you are not taking a prescription pain medicine, ask your doctor if you can take acetaminophen (Tylenol). Do not take ibuprofen (Advil, Motrin) or naproxen (Aleve), or similar medicines unless your doctor tells you to. ? Do not take two or more pain medicines at the same time unless the doctor told you to. Many pain medicines have acetaminophen, which is Tylenol. Too much acetaminophen (Tylenol) can be harmful. Other instructions    · Urinate through the strainer the doctor gives you. Save any stone pieces, including those that look like sand or gravel. Take these to your doctor. This will help your doctor find the cause of your stones. Follow-up care is a key part of your treatment and safety.  Be sure to make and go to all appointments, and call your doctor if you are having problems. It's also a good idea to know your test results and keep a list of the medicines you take. When should you call for help? Call 911 anytime you think you may need emergency care. For example, call if:    · You passed out (lost consciousness).     · You have chest pain, are short of breath, or cough up blood.    Call your doctor now or seek immediate medical care if:    · You have pain that does not get better after you take pain medicine.     · You have new or more blood clots in your urine. (It is normal for the urine to be pink for a few days.)     · You cannot urinate.     · You have symptoms of a urinary tract infection. These may include:  ? Pain or burning when you urinate. ? A frequent need to urinate without being able to pass much urine. ? Pain in the flank, which is just below the rib cage and above the waist on either side of the back. ? Blood in the urine. ? A fever.     · You are sick to your stomach or cannot drink fluids.     · You have signs of a blood clot in your leg (called a deep vein thrombosis), such as:  ? Pain in the calf, back of the knee, thigh, or groin. ? Redness and swelling in your leg.    Watch closely for any changes in your health, and be sure to contact your doctor if you have any problems. Where can you learn more? Go to http://macey-evita.info/. Enter W438 in the search box to learn more about \"Lithotripsy: What to Expect at Home. \"  Current as of: March 14, 2018  Content Version: 11.9  © 7960-3949 [employer]. Care instructions adapted under license by [employer] (which disclaims liability or warranty for this information). If you have questions about a medical condition or this instruction, always ask your healthcare professional. [address] any warranty or liability for your use of this information.              Discharge Instructions      Patient: Jose Alejandro Ibanez               Sex: female          DOA: 1/29/2019         YOB: 1960      Age:  62 y.o. ACUTE DIAGNOSES:  Nephrolithiasis     DIET: General     ACTIVITY: No restrictions     ADDITIONAL INFORMATION:   You have indwelling ureteral stents which frequently causes slight discomfort in the flank region and bladder spasms. If these occur beyond 24 hours after surgery, please contact our office to prescribe you flomax as needed for flank discomfort or Ditropan for bladder spasms. The indwelling stent will need to be removed/exchanged as directed below. If the stent is left in place without appropriate follow-up, it may become encrusted with stone and/or infected. If that occurs, you will require multiple additional surgeries to fix this. It is very important you follow up for appropriate removal or exchange of ureteral stents. If you experience any fevers > 100.4, significant nausea/vomiting, or significant worsening of pain, please contact us at the number below. It is common to experience mild, recurrent blood in your urine and this is likely due to stent irritation. If the bleeding continues to worsen with passage of clots, you are unable to urinate, or you experience significant light-headedness, please contact us at the number below. STENT: Your stents are attached to small strings coming out of your urethra. An appointment will be made for you to come to the office and have the stent removed with the assistance of a nurse. After the stent is removed it is common to experience some flank pain. This flank pain should resolve in 24-48 hours and should be manageable with pain medications. If pain is not controlled with pain medications, you experience nausea, vomiting, or fevers please notify us at the number below.       FOLLOW UP CARE:  You have a return appointment on Monday for stent removal by a nurse by pulling the string out.      Following that you have an appointment with Urodynamics, Ultrasound and visit with Dr. Yessenia Rodriguez.

## 2019-01-30 NOTE — DISCHARGE SUMMARY
Discharge Summary     Patient ID:  Sam Henderson  [id number]  39 y.o.  1960    Admit Date: 1/29/2019    Discharge Date: 1/30/2019    * Admission Diagnoses: Kidney stones [N20.0]    * Discharge Diagnoses:    Hospital Problems as of 1/30/2019 Date Reviewed: 1/6/2019          Codes Class Noted - Resolved POA    Kidney stone ICD-10-CM: N20.0  ICD-9-CM: 592.0  1/29/2019 - Present Unknown        Nephrolithiasis ICD-10-CM: N20.0  ICD-9-CM: 592.0  1/29/2019 - Present Unknown               Admission Condition: Good    * Discharge Condition: improved    * Procedures: Procedure(s):  CYSTOSCOPY/RIGHT URETEROSCOPY/100W HOLMIUM LASER LITHOTRIPSY/STONE BASKET EXTRACTION/DOUBLE J STENT/C-ARM    [facility] Course:   Normal hospital course for this procedure. Overnight Observation    Consults: None     Physical Examination:  Visit Vitals  /77 (BP 1 Location: Left arm, BP Patient Position: At rest)   Pulse 85   Temp 98.5 °F (36.9 °C)   Resp 17   Ht 5' 4.5\" (1.638 m)   Wt 189 lb (85.7 kg)   SpO2 97%   BMI 31.94 kg/m²      Constitutional: WDWN, Pleasant and appropriate affect, No acute distress. Head: Normocephalic and atraumatic. Eyes: No discharge. No scleral icterus. Neck: Normal range of motion. Neck supple. No JVD present. No tracheal deviation present. Pulmonary/Chest: Effort normal. No respiratory distress. ABD: Soft, mild left side discomfort   Musc:normal gait and station  Psych: normal affect and mood, well groomed, appropriate answers, A&Ox3  Skin: Skin is warm and dry. : No CVA Tenderness. Mcmullen draining clear yellow urine      Significant Diagnostic Studies:       Labs: Results:   Chemistry  No results for input(s): GLU, NA, K, CL, CO2, BUN, CREA, CA, AGAP, BUCR, TBIL, GPT, AP, TP, ALB, GLOB, AGRAT in the last 72 hours. CBC w/Diff No results for input(s): WBC, RBC, HGB, HCT, PLT, GRANS, LYMPH, EOS, HGBEXT, HCTEXT, PLTEXT in the last 72 hours.    Cultures No results for input(s): CULT in the last 72 hours. All Micro Results     None            Urinalysis Color   Date Value Ref Range Status   02/29/2016 YELLOW   Final     Appearance   Date Value Ref Range Status   02/29/2016 CLEAR   Final     Specific gravity   Date Value Ref Range Status   02/29/2016 1.020 1.005 - 1.030   Final     pH (UA)   Date Value Ref Range Status   02/29/2016 5.5 5.0 - 8.0   Final     Protein   Date Value Ref Range Status   02/29/2016 NEGATIVE  NEG mg/dL Final     Ketone   Date Value Ref Range Status   02/29/2016 NEGATIVE  NEG mg/dL Final     Bilirubin   Date Value Ref Range Status   02/29/2016 NEGATIVE  NEG   Final     Blood   Date Value Ref Range Status   02/29/2016 NEGATIVE  NEG   Final     Urobilinogen   Date Value Ref Range Status   02/29/2016 1.0 0.2 - 1.0 EU/dL Final     Nitrites   Date Value Ref Range Status   02/29/2016 NEGATIVE  NEG   Final     Leukocyte Esterase   Date Value Ref Range Status   02/29/2016 NEGATIVE  NEG   Final     Potassium   Date Value Ref Range Status   01/24/2019 4.1 3.5 - 5.5 mmol/L Final     Creatinine   Date Value Ref Range Status   01/24/2019 1.27 0.6 - 1.3 MG/DL Final     BUN   Date Value Ref Range Status   01/24/2019 22 (H) 7.0 - 18 MG/DL Final      PSA No results for input(s): PSA in the last 72 hours. Coagulation Lab Results   Component Value Date/Time    Prothrombin time 12.9 01/24/2019 04:03 PM    INR 1.0 01/24/2019 04:03 PM    aPTT 32.0 01/24/2019 04:03 PM           * Disposition: Home    Discharge Medications:   Current Discharge Medication List      START taking these medications    Details   nitrofurantoin, macrocrystal-monohydrate, (MACROBID) 100 mg capsule Take 1 Cap by mouth two (2) times a day for 7 days. Qty: 14 Cap, Refills: 0      !! oxyCODONE-acetaminophen (PERCOCET) 5-325 mg per tablet Take 1 Tab by mouth every four (4) hours as needed for Pain. Max Daily Amount: 6 Tabs.   Qty: 10 Tab, Refills: 0    Associated Diagnoses: Kidney stone       !! - Potential duplicate medications found. Please discuss with provider. CONTINUE these medications which have NOT CHANGED    Details   amLODIPine (NORVASC) 10 mg tablet Take 10 mg by mouth daily. hydroCHLOROthiazide (HYDRODIURIL) 25 mg tablet Take 25 mg by mouth daily. ferrous sulfate 325 mg (65 mg iron) tablet Take 325 mg by mouth Daily (before breakfast). cyclobenzaprine (FLEXERIL) 10 mg tablet Take 10 mg by mouth three (3) times daily as needed for Muscle Spasm(s). senna-docusate (PERICOLACE) 8.6-50 mg per tablet Take 2 Tabs by Mouth Every Morning. VITAMIN D2 50,000 unit capsule every seven (7) days. Refills: 3      !! oxyCODONE-acetaminophen (PERCOCET) 5-325 mg per tablet Take 1 Tab by mouth every four (4) hours as needed for Pain. atorvastatin (LIPITOR) 20 mg tablet Take  by mouth daily. !! - Potential duplicate medications found. Please discuss with provider. STOP taking these medications       levoFLOXacin (LEVAQUIN) 500 mg tablet Comments:   Reason for Stopping:               * Follow-up Care/Patient Instructions: Activity: Light duty  Diet: Resume previous diet  Wound Care: None needed  Wound care discussed with patient. Follow-up Information     Follow up With Specialties Details Why [address]  [phone]            PCP:  Maciel Andrew DO  Referring Provider: No ref. provider found    Follow up Sandi Ren DO in 7-14 days. Follow up with:Nurse Visit next week for stent removal and with Dr Danita Enciso as scheduled for Urodynamics and f/up to review.     SignedFoye TAMANNA Newberry    [phone]    January 30, 2019 9:52 AM

## 2019-01-30 NOTE — PROGRESS NOTES
Discharge  Planning    Discharge order noted for today. Referral sent to [facility] agency. Met with pt and  agreeable to the transition plan today. Transport has been arranged with son P'ts discharge [facility] orders have been forwarded to [place].  will continue to monitor for transitional needs for a safe discharge.      NATALY Zamudio, RN  Pager # [phone]  Care Manager

## 2019-02-01 LAB
CA PHOS MFR STONE: 3 %
CALCIUM OXALATE DIHYDRATE MFR STONE IR: 2 %
COLOR STONE: NORMAL
COM MFR STONE: 95 %
COMMENT, 519994: NORMAL
COMPOSITION, 120440: NORMAL
DISCLAIMER, STO32L: NORMAL
NIDUS STONE QL: NORMAL
SIZE STONE: NORMAL MM
WT STONE: 221.4 MG

## 2019-09-03 ENCOUNTER — DOCUMENTATION ONLY (OUTPATIENT)
Dept: SURGERY | Age: 59
End: 2019-09-03

## 2019-09-03 NOTE — PROGRESS NOTES
Patient came in office to find out about her revision packet. Told patient that we tried contacting her on 7/17 and was unable to LVM.  She has to see Dr. Alves Members per Dr. Reyes

## 2021-07-12 ENCOUNTER — OFFICE VISIT (OUTPATIENT)
Dept: ORTHOPEDIC SURGERY | Age: 61
End: 2021-07-12
Payer: MEDICARE

## 2021-07-12 VITALS
HEIGHT: 64 IN | OXYGEN SATURATION: 99 % | TEMPERATURE: 96.8 F | BODY MASS INDEX: 33.12 KG/M2 | WEIGHT: 194 LBS | HEART RATE: 80 BPM

## 2021-07-12 DIAGNOSIS — M25.512 CHRONIC LEFT SHOULDER PAIN: ICD-10-CM

## 2021-07-12 DIAGNOSIS — G89.29 CHRONIC LEFT SHOULDER PAIN: ICD-10-CM

## 2021-07-12 DIAGNOSIS — M75.102 NONTRAUMATIC TEAR OF LEFT ROTATOR CUFF, UNSPECIFIED TEAR EXTENT: Primary | ICD-10-CM

## 2021-07-12 PROCEDURE — 20611 DRAIN/INJ JOINT/BURSA W/US: CPT | Performed by: ORTHOPAEDIC SURGERY

## 2021-07-12 PROCEDURE — 99214 OFFICE O/P EST MOD 30 MIN: CPT | Performed by: ORTHOPAEDIC SURGERY

## 2021-07-12 PROCEDURE — G8427 DOCREV CUR MEDS BY ELIG CLIN: HCPCS | Performed by: ORTHOPAEDIC SURGERY

## 2021-07-12 PROCEDURE — 73030 X-RAY EXAM OF SHOULDER: CPT | Performed by: ORTHOPAEDIC SURGERY

## 2021-07-12 PROCEDURE — 3017F COLORECTAL CA SCREEN DOC REV: CPT | Performed by: ORTHOPAEDIC SURGERY

## 2021-07-12 PROCEDURE — G8756 NO BP MEASURE DOC: HCPCS | Performed by: ORTHOPAEDIC SURGERY

## 2021-07-12 PROCEDURE — G8419 CALC BMI OUT NRM PARAM NOF/U: HCPCS | Performed by: ORTHOPAEDIC SURGERY

## 2021-07-12 PROCEDURE — G9717 DOC PT DX DEP/BP F/U NT REQ: HCPCS | Performed by: ORTHOPAEDIC SURGERY

## 2021-07-12 RX ORDER — TRIAMCINOLONE ACETONIDE 40 MG/ML
40 INJECTION, SUSPENSION INTRA-ARTICULAR; INTRAMUSCULAR ONCE
Status: COMPLETED | OUTPATIENT
Start: 2021-07-12 | End: 2021-07-12

## 2021-07-12 RX ORDER — GABAPENTIN 300 MG/1
300 CAPSULE ORAL 3 TIMES DAILY
COMMUNITY

## 2021-07-12 RX ADMIN — TRIAMCINOLONE ACETONIDE 40 MG: 40 INJECTION, SUSPENSION INTRA-ARTICULAR; INTRAMUSCULAR at 14:23
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
## 2021-07-20 ENCOUNTER — OFFICE VISIT (OUTPATIENT)
Dept: ORTHOPEDIC SURGERY | Age: 61
End: 2021-07-20
Payer: MEDICARE

## 2021-07-20 VITALS
HEIGHT: 65 IN | RESPIRATION RATE: 16 BRPM | BODY MASS INDEX: 32.39 KG/M2 | TEMPERATURE: 97 F | WEIGHT: 194.4 LBS | HEART RATE: 83 BPM | OXYGEN SATURATION: 98 %

## 2021-07-20 DIAGNOSIS — M79.672 LEFT FOOT PAIN: ICD-10-CM

## 2021-07-20 DIAGNOSIS — M79.673 INTRACTABLE HEEL PAIN: ICD-10-CM

## 2021-07-20 DIAGNOSIS — S90.32XA CONTUSION OF FOOT OR HEEL, LEFT, INITIAL ENCOUNTER: ICD-10-CM

## 2021-07-20 DIAGNOSIS — S92.045A OTHER CLOSED NONDISPLACED FRACTURE OF TUBEROSITY OF LEFT CALCANEUS, INITIAL ENCOUNTER: Primary | ICD-10-CM

## 2021-07-20 PROCEDURE — G8419 CALC BMI OUT NRM PARAM NOF/U: HCPCS | Performed by: ORTHOPAEDIC SURGERY

## 2021-07-20 PROCEDURE — 3017F COLORECTAL CA SCREEN DOC REV: CPT | Performed by: ORTHOPAEDIC SURGERY

## 2021-07-20 PROCEDURE — 99213 OFFICE O/P EST LOW 20 MIN: CPT | Performed by: ORTHOPAEDIC SURGERY

## 2021-07-20 PROCEDURE — 73630 X-RAY EXAM OF FOOT: CPT | Performed by: ORTHOPAEDIC SURGERY

## 2021-07-20 PROCEDURE — 28400 CLTX CALCANEAL FX W/O MNPJ: CPT | Performed by: ORTHOPAEDIC SURGERY

## 2021-07-20 PROCEDURE — G8756 NO BP MEASURE DOC: HCPCS | Performed by: ORTHOPAEDIC SURGERY

## 2021-07-20 PROCEDURE — G8427 DOCREV CUR MEDS BY ELIG CLIN: HCPCS | Performed by: ORTHOPAEDIC SURGERY

## 2021-07-20 PROCEDURE — G9717 DOC PT DX DEP/BP F/U NT REQ: HCPCS | Performed by: ORTHOPAEDIC SURGERY

## 2021-07-20 RX ORDER — MELOXICAM 15 MG/1
15 TABLET ORAL DAILY
Qty: 30 TABLET | Refills: 1 | Status: SHIPPED | OUTPATIENT
Start: 2021-07-20 | End: 2022-08-19

## 2021-07-20 RX ORDER — FAMOTIDINE 40 MG/1
40 TABLET, FILM COATED ORAL DAILY
Qty: 30 TABLET | Refills: 1 | Status: SHIPPED | OUTPATIENT
Start: 2021-07-20 | End: 2021-08-12
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
## 2021-08-03 PROBLEM — N20.0 KIDNEY STONE: Status: RESOLVED | Noted: 2019-01-29 | Resolved: 2021-08-03

## 2021-08-05 NOTE — PROGRESS NOTES
Jyotsna Estimable Teddy  1960   Chief Complaint   Patient presents with    Shoulder Pain     left shoulder        HISTORY OF PRESENT ILLNESS  Kiesha Espinal is a 64 y.o. female who presents today for reevaluation of left shoulder pain. Patient rates pain as 3/10 today. At last OV on 7/12/2021, patient had a left shoulder intra-bursal cortisone injection which provided good relief. Notes she is feeling 80% better today. Pain has been present for at least 5-6 months. No specific injury. Pt is s/p R rotator cuff repair 3/21/16 .       Patient denies any fever, chills, chest pain, shortness of breath or calf pain. The remainder of the review of systems is negative. There are no new illness or injuries to report since last seen in the office. There are no changes to medications, allergies, family or social history. PHYSICAL EXAM:   Visit Vitals  Pulse 88   Resp 15   Ht 5' 4\" (1.626 m)   Wt 190 lb (86.2 kg)   SpO2 98%   BMI 32.61 kg/m²     The patient is a well-developed, well-nourished female   in no acute distress. The patient is alert and oriented times three. The patient is alert and oriented times three. Mood and affect are normal.  LYMPHATIC: lymph nodes are not enlarged and are within normal limits  SKIN: normal in color and non tender to palpation. There are no bruises or abrasions noted. NEUROLOGICAL: Motor sensory exam is within normal limits. Reflexes are equal bilaterally.  There is normal sensation to pinprick and light touch  MUSCULOSKELETAL:  Examination Left shoulder   Skin Intact   AC joint tenderness -   Biceps tenderness -   Forward flexion/Elevation    Active abduction    Glenohumeral abduction 90   External rotation ROM 45   Internal rotation ROM 30   Apprehension -   Jeffreys Relocation -   Jerk -   Load and Shift -   Obriens -   Speeds -   Impingement sign +   Supraspinatus/Empty Can +   External Rotation Strength -, 5/5   Lift Off/Belly Press -, 5/5 Neurovascular Intact        IMAGING: XR of left shoulder with 3 views obtained in the office dated 7/12/2021 was reviewed and read by Dr. Tamy Elias: Type III acromion with Sclerotic changes in the greater tuberosity. IMPRESSION:      ICD-10-CM ICD-9-CM    1. Nontraumatic tear of left rotator cuff, unspecified tear extent  M75.102 727.61         PLAN:   1. Pt presents today with left shoulder pain and symptoms c/w a RCT. She has improved with the cortisone injection, and I would like her to return if pain increases. Risk factors include: htn, BMI>30  2. No ultrasound exam indicated today  3. No cortisone injection indicated today   4. No Physical/Occupational Therapy indicated today  5. No diagnostic test indicated today:   6. No durable medical equipment indicated today  7. No referral indicated today   8. No medications indicated today:   9. No Narcotic indicated today      RTC prn      Scribed by Kaur Guevara [address]) as dictated by Marisol Gracia MD    I, Dr. Marisol Gracia, confirm that all documentation is accurate.     Marisol Graica M.D.   [facility]

## 2021-08-09 ENCOUNTER — OFFICE VISIT (OUTPATIENT)
Dept: ORTHOPEDIC SURGERY | Age: 61
End: 2021-08-09
Payer: MEDICARE

## 2021-08-09 VITALS
OXYGEN SATURATION: 98 % | BODY MASS INDEX: 32.44 KG/M2 | HEIGHT: 64 IN | RESPIRATION RATE: 15 BRPM | HEART RATE: 88 BPM | WEIGHT: 190 LBS

## 2021-08-09 DIAGNOSIS — M75.102 NONTRAUMATIC TEAR OF LEFT ROTATOR CUFF, UNSPECIFIED TEAR EXTENT: Primary | ICD-10-CM

## 2021-08-09 PROCEDURE — 3017F COLORECTAL CA SCREEN DOC REV: CPT | Performed by: ORTHOPAEDIC SURGERY

## 2021-08-09 PROCEDURE — G8427 DOCREV CUR MEDS BY ELIG CLIN: HCPCS | Performed by: ORTHOPAEDIC SURGERY

## 2021-08-09 PROCEDURE — 99212 OFFICE O/P EST SF 10 MIN: CPT | Performed by: ORTHOPAEDIC SURGERY

## 2021-08-09 PROCEDURE — G8756 NO BP MEASURE DOC: HCPCS | Performed by: ORTHOPAEDIC SURGERY

## 2021-08-09 PROCEDURE — G8417 CALC BMI ABV UP PARAM F/U: HCPCS | Performed by: ORTHOPAEDIC SURGERY

## 2021-08-09 PROCEDURE — G9717 DOC PT DX DEP/BP F/U NT REQ: HCPCS | Performed by: ORTHOPAEDIC SURGERY
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
## 2021-08-11 ENCOUNTER — OFFICE VISIT (OUTPATIENT)
Dept: ORTHOPEDIC SURGERY | Age: 61
End: 2021-08-11
Payer: MEDICARE

## 2021-08-11 VITALS
WEIGHT: 195 LBS | TEMPERATURE: 97.3 F | OXYGEN SATURATION: 96 % | HEIGHT: 64 IN | BODY MASS INDEX: 33.29 KG/M2 | HEART RATE: 76 BPM

## 2021-08-11 DIAGNOSIS — M79.672 LEFT FOOT PAIN: ICD-10-CM

## 2021-08-11 DIAGNOSIS — M84.375D STRESS FRACTURE OF LEFT FOOT WITH ROUTINE HEALING, SUBSEQUENT ENCOUNTER: ICD-10-CM

## 2021-08-11 DIAGNOSIS — M79.672 LEFT FOOT PAIN: Primary | ICD-10-CM

## 2021-08-11 DIAGNOSIS — M79.673 INTRACTABLE HEEL PAIN: ICD-10-CM

## 2021-08-11 DIAGNOSIS — M75.102 NONTRAUMATIC TEAR OF LEFT ROTATOR CUFF, UNSPECIFIED TEAR EXTENT: ICD-10-CM

## 2021-08-11 PROCEDURE — 99024 POSTOP FOLLOW-UP VISIT: CPT | Performed by: ORTHOPAEDIC SURGERY

## 2021-08-11 PROCEDURE — 73630 X-RAY EXAM OF FOOT: CPT | Performed by: ORTHOPAEDIC SURGERY

## 2021-08-11 RX ORDER — TRAMADOL HYDROCHLORIDE 50 MG/1
50 TABLET ORAL
Qty: 20 TABLET | Refills: 0 | Status: CANCELLED | OUTPATIENT
Start: 2021-08-11 | End: 2021-08-25

## 2021-08-12 RX ORDER — FAMOTIDINE 40 MG/1
TABLET, FILM COATED ORAL
Qty: 30 TABLET | Refills: 1 | Status: SHIPPED | OUTPATIENT
Start: 2021-08-12 | End: 2021-09-08 | Stop reason: SDUPTHER

## 2021-09-08 DIAGNOSIS — M79.673 INTRACTABLE HEEL PAIN: ICD-10-CM

## 2021-09-08 DIAGNOSIS — M79.672 LEFT FOOT PAIN: ICD-10-CM

## 2021-09-08 RX ORDER — FAMOTIDINE 40 MG/1
TABLET, FILM COATED ORAL
Qty: 30 TABLET | Refills: 1 | Status: SHIPPED | OUTPATIENT
Start: 2021-09-08 | End: 2022-08-19

## 2022-01-05 ENCOUNTER — OFFICE VISIT (OUTPATIENT)
Dept: ORTHOPEDIC SURGERY | Age: 62
End: 2022-01-05
Payer: MEDICARE

## 2022-01-05 VITALS
WEIGHT: 195 LBS | TEMPERATURE: 97.8 F | OXYGEN SATURATION: 97 % | HEIGHT: 65 IN | BODY MASS INDEX: 32.49 KG/M2 | HEART RATE: 61 BPM

## 2022-01-05 DIAGNOSIS — M75.102 NONTRAUMATIC TEAR OF LEFT ROTATOR CUFF, UNSPECIFIED TEAR EXTENT: Primary | ICD-10-CM

## 2022-01-05 PROCEDURE — G8427 DOCREV CUR MEDS BY ELIG CLIN: HCPCS | Performed by: ORTHOPAEDIC SURGERY

## 2022-01-05 PROCEDURE — G9717 DOC PT DX DEP/BP F/U NT REQ: HCPCS | Performed by: ORTHOPAEDIC SURGERY

## 2022-01-05 PROCEDURE — G8756 NO BP MEASURE DOC: HCPCS | Performed by: ORTHOPAEDIC SURGERY

## 2022-01-05 PROCEDURE — 20611 DRAIN/INJ JOINT/BURSA W/US: CPT | Performed by: ORTHOPAEDIC SURGERY

## 2022-01-05 PROCEDURE — 99214 OFFICE O/P EST MOD 30 MIN: CPT | Performed by: ORTHOPAEDIC SURGERY

## 2022-01-05 PROCEDURE — 3017F COLORECTAL CA SCREEN DOC REV: CPT | Performed by: ORTHOPAEDIC SURGERY

## 2022-01-05 PROCEDURE — G8417 CALC BMI ABV UP PARAM F/U: HCPCS | Performed by: ORTHOPAEDIC SURGERY

## 2022-01-05 RX ORDER — TRIAMCINOLONE ACETONIDE 40 MG/ML
40 INJECTION, SUSPENSION INTRA-ARTICULAR; INTRAMUSCULAR ONCE
Status: COMPLETED | OUTPATIENT
Start: 2022-01-05 | End: 2022-01-05

## 2022-01-05 RX ADMIN — TRIAMCINOLONE ACETONIDE 40 MG: 40 INJECTION, SUSPENSION INTRA-ARTICULAR; INTRAMUSCULAR at 16:29

## 2022-01-05 NOTE — PROGRESS NOTES
Kaley Espinal  1960   Chief Complaint   Patient presents with    Shoulder Pain     left shoulder        HISTORY OF PRESENT ILLNESS  Kiesha Espinal is a 64 y.o. female who presents today for reevaluation of left shoulder pain. Patient rates pain as 3/10 today. At [address] on 7/12/2021, patient had a left shoulder intra-bursal cortisone injection which provided good relief. The pain returned in the last couple of weeks. She now has pain in the neck region as well as down to the side. Pt is s/p right rotator cuff repair 3/21/16. Patient denies any fever, chills, chest pain, shortness of breath or calf pain. The remainder of the review of systems is negative. There are no new illness or injuries to report since last seen in the office. There are no changes to medications, allergies, family or social history. PHYSICAL EXAM:   Visit Vitals  Pulse 61   Temp 97.8 °F (36.6 °C) (Temporal)   Ht 5' 4.5\" (1.638 m)   Wt 195 lb (88.5 kg)   SpO2 97%   BMI 32.95 kg/m²     The patient is a well-developed, well-nourished female   in no acute distress. The patient is alert and oriented times three. The patient is alert and oriented times three. Mood and affect are normal.  LYMPHATIC: lymph nodes are not enlarged and are within normal limits  SKIN: normal in color and non tender to palpation. There are no bruises or abrasions noted. NEUROLOGICAL: Motor sensory exam is within normal limits. Reflexes are equal bilaterally.  There is normal sensation to pinprick and light touch  MUSCULOSKELETAL:  Examination Left shoulder   Skin Intact   AC joint tenderness -   Biceps tenderness -   Forward flexion/Elevation    Active abduction    Glenohumeral abduction 90   External rotation ROM 45   Internal rotation ROM 30   Apprehension -   Jeffreys Relocation -   Jerk -   Load and Shift -   Obriens -   Speeds -   Impingement sign +   Supraspinatus/Empty Can +   External Rotation Strength -, 5/5   Lift Off/Belly Press -, 5/5   Neurovascular Intact      PROCEDURE:   Left shoulder Injection with Ultrasound Guidance    Indication:Left Shoulder pain/swelling    After sterile prep, 6 cc of Xylocaine and 1 cc of Kenalog were injected into the left Shoulder. Intra-bursal Ultrasound images captured using [address] Ultrasound machine using a frequency of 10 MHz with a linear transducer and scanned into patient's chart. [facility]  OFFICE PROCEDURE PROGRESS NOTE        Chart reviewed for the following:  Maddison Hull M.D, have reviewed the History, Physical and updated the Allergic reactions for Kiesha Espinal     TIME OUT performed immediately prior to start of procedure:  Maddison Hull M.D, have performed the following reviews on Kiesha Espinal prior to the start of the procedure:            * Patient was identified by name and date of birth   * Agreement on procedure being performed was verified  * Risks and Benefits explained to the patient  * Procedure site verified and marked as necessary  * Patient was positioned for comfort  * Needle placement confirmed by ultrasound  * Consent was signed and verified     Time: 2:23 PM     Date of procedure: 1/5/2022    Procedure performed by:  Pete Helton M.D    Provider assisted by: (see medication administration)    How tolerated by patient: tolerated the procedure well with no complications    Comments: none    IMAGING: XR of left shoulder with 3 views obtained in the office dated 7/12/2021 was reviewed and read by Dr. Cote Delay: Type III acromion with Sclerotic changes in the greater tuberosity. IMPRESSION:      ICD-10-CM ICD-9-CM    1. Nontraumatic tear of left rotator cuff, unspecified tear extent  M75.102 727.61         PLAN:   1. Pt presents today with left shoulder pain and symptoms c/w a RCT.  Due to her lasting relief from the previous cortisone injection, I am hopeful a repeat injection will provide relief. Return in 3 weeks if pain continues   Risk factors include: htn, BMI>30  2. No ultrasound exam indicated today  3. Yes cortisone injection indicated today L SHOULDER US  4. No Physical/Occupational Therapy indicated today  5. No diagnostic test indicated today:   6. No durable medical equipment indicated today  7. No referral indicated today   8. No medications indicated today:   9. No Narcotic indicated today      RTC 3 weeks if pain continues       Scribed by Lazarus Cordoba [facility]) as dictated by Gosia Delacruz MD    I, Dr. Gosia Delacruz, confirm that all documentation is accurate.     Gosia Delacruz M.D.   [facility]
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
## 2022-01-26 ENCOUNTER — OFFICE VISIT (OUTPATIENT)
Dept: ORTHOPEDIC SURGERY | Age: 62
End: 2022-01-26
Payer: MEDICARE

## 2022-01-26 VITALS — HEIGHT: 65 IN | WEIGHT: 205 LBS | BODY MASS INDEX: 34.16 KG/M2 | TEMPERATURE: 97.7 F

## 2022-01-26 DIAGNOSIS — M79.673 INTRACTABLE HEEL PAIN: Primary | ICD-10-CM

## 2022-01-26 PROCEDURE — G9717 DOC PT DX DEP/BP F/U NT REQ: HCPCS | Performed by: ORTHOPAEDIC SURGERY

## 2022-01-26 PROCEDURE — 3017F COLORECTAL CA SCREEN DOC REV: CPT | Performed by: ORTHOPAEDIC SURGERY

## 2022-01-26 PROCEDURE — G8756 NO BP MEASURE DOC: HCPCS | Performed by: ORTHOPAEDIC SURGERY

## 2022-01-26 PROCEDURE — G8417 CALC BMI ABV UP PARAM F/U: HCPCS | Performed by: ORTHOPAEDIC SURGERY

## 2022-01-26 PROCEDURE — 99213 OFFICE O/P EST LOW 20 MIN: CPT | Performed by: ORTHOPAEDIC SURGERY

## 2022-01-26 PROCEDURE — G8427 DOCREV CUR MEDS BY ELIG CLIN: HCPCS | Performed by: ORTHOPAEDIC SURGERY

## 2022-03-20 PROBLEM — N20.0 NEPHROLITHIASIS: Status: ACTIVE | Noted: 2019-01-29

## 2022-04-07 ENCOUNTER — OFFICE VISIT (OUTPATIENT)
Dept: ORTHOPEDIC SURGERY | Age: 62
End: 2022-04-07
Payer: MEDICARE

## 2022-04-07 VITALS — OXYGEN SATURATION: 98 % | HEART RATE: 72 BPM | BODY MASS INDEX: 34.16 KG/M2 | WEIGHT: 205 LBS | HEIGHT: 65 IN

## 2022-04-07 DIAGNOSIS — M75.102 NONTRAUMATIC TEAR OF LEFT ROTATOR CUFF, UNSPECIFIED TEAR EXTENT: Primary | ICD-10-CM

## 2022-04-07 PROCEDURE — G8417 CALC BMI ABV UP PARAM F/U: HCPCS | Performed by: ORTHOPAEDIC SURGERY

## 2022-04-07 PROCEDURE — G9717 DOC PT DX DEP/BP F/U NT REQ: HCPCS | Performed by: ORTHOPAEDIC SURGERY

## 2022-04-07 PROCEDURE — G8427 DOCREV CUR MEDS BY ELIG CLIN: HCPCS | Performed by: ORTHOPAEDIC SURGERY

## 2022-04-07 PROCEDURE — 99214 OFFICE O/P EST MOD 30 MIN: CPT | Performed by: ORTHOPAEDIC SURGERY

## 2022-04-07 PROCEDURE — 3017F COLORECTAL CA SCREEN DOC REV: CPT | Performed by: ORTHOPAEDIC SURGERY

## 2022-04-07 PROCEDURE — G8756 NO BP MEASURE DOC: HCPCS | Performed by: ORTHOPAEDIC SURGERY

## 2022-04-07 NOTE — PROGRESS NOTES
[facility]  1960   Chief Complaint   Patient presents with    Shoulder Pain     left        HISTORY OF PRESENT ILLNESS  Shawna Doe is a 64 y.o. female who presents today for reevaluation of left shoulder pain. Patient rates pain as 8/10 today. At [facility] on 1/5/2022, patient had a left shoulder intra-bursal cortisone injection which provided good relief for about one month. The pain returned in the last couple of weeks. She now has pain in the neck region as well as down to the side. Pt is s/p right rotator cuff repair 3/21/16. Patient denies any fever, chills, chest pain, shortness of breath or calf pain. The remainder of the review of systems is negative. There are no new illness or injuries to report since last seen in the office. There are no changes to medications, allergies, family or social history. PHYSICAL EXAM:   Visit Vitals  Pulse 72   Ht 5' 4.5\" (1.638 m)   Wt 205 lb (93 kg)   SpO2 98%   BMI 34.64 kg/m²     The patient is a well-developed, well-nourished female   in no acute distress. The patient is alert and oriented times three. The patient is alert and oriented times three. Mood and affect are normal.  LYMPHATIC: lymph nodes are not enlarged and are within normal limits  SKIN: normal in color and non tender to palpation. There are no bruises or abrasions noted. NEUROLOGICAL: Motor sensory exam is within normal limits. Reflexes are equal bilaterally.  There is normal sensation to pinprick and light touch  MUSCULOSKELETAL:  Examination Left shoulder   Skin Intact   AC joint tenderness -   Biceps tenderness -   Forward flexion/Elevation    Active abduction    Glenohumeral abduction 90   External rotation ROM 45   Internal rotation ROM 30   Apprehension -   Jeffreys Relocation -   Jerk -   Load and Shift -   Obriens -   Speeds -   Impingement sign +   Supraspinatus/Empty Can +   External Rotation Strength -, 5/5   Lift Off/Belly Press -, 5/5 Neurovascular Intact      PROCEDURE: none    IMAGING: XR of left shoulder with 3 views obtained in the office dated 7/12/2021 was reviewed and read by Dr. Julita Sarkar: Type III acromion with Sclerotic changes in the greater tuberosity. IMPRESSION:      ICD-10-CM ICD-9-CM    1. Nontraumatic tear of left rotator cuff, unspecified tear extent  M75.102 727.61         PLAN:   1. Pt presents today with left shoulder pain and symptoms c/w a RCT. Due to her lack of relief from the injection, we will order a MRI. Risk factors include: htn, BMI>30  2. No ultrasound exam indicated today  3. No cortisone injection indicated today   4. No Physical/Occupational Therapy indicated today  5. Yes diagnostic test indicated today:   6. No durable medical equipment indicated today  7. No referral indicated today   8. No medications indicated today:   9. No Narcotic indicated today      RTC Return following MRI       Scribed by Bi Funes) as dictated by Kristen Do MD    I, Dr. Kristen Do, confirm that all documentation is accurate.     Kristen Do M.D.   [facility]

## 2022-05-10 ENCOUNTER — HOSPITAL ENCOUNTER (OUTPATIENT)
Dept: MRI IMAGING | Age: 62
Discharge: HOME | End: 2022-05-10
Attending: ORTHOPAEDIC SURGERY
Payer: MEDICARE

## 2022-05-10 DIAGNOSIS — M75.102 NONTRAUMATIC TEAR OF LEFT ROTATOR CUFF, UNSPECIFIED TEAR EXTENT: ICD-10-CM

## 2022-05-10 PROCEDURE — 73221 MRI JOINT UPR EXTREM W/O DYE: CPT

## 2022-05-16 ENCOUNTER — OFFICE VISIT (OUTPATIENT)
Dept: ORTHOPEDIC SURGERY | Age: 62
End: 2022-05-16
Payer: MEDICARE

## 2022-05-16 VITALS — HEART RATE: 86 BPM | HEIGHT: 65 IN | WEIGHT: 205 LBS | OXYGEN SATURATION: 98 % | BODY MASS INDEX: 34.16 KG/M2

## 2022-05-16 DIAGNOSIS — M54.12 CERVICAL RADICULOPATHY: ICD-10-CM

## 2022-05-16 DIAGNOSIS — M75.102 NONTRAUMATIC TEAR OF LEFT ROTATOR CUFF, UNSPECIFIED TEAR EXTENT: Primary | ICD-10-CM

## 2022-05-16 PROCEDURE — 99214 OFFICE O/P EST MOD 30 MIN: CPT | Performed by: ORTHOPAEDIC SURGERY

## 2022-05-16 PROCEDURE — G9717 DOC PT DX DEP/BP F/U NT REQ: HCPCS | Performed by: ORTHOPAEDIC SURGERY

## 2022-05-16 PROCEDURE — G8756 NO BP MEASURE DOC: HCPCS | Performed by: ORTHOPAEDIC SURGERY

## 2022-05-16 PROCEDURE — G8427 DOCREV CUR MEDS BY ELIG CLIN: HCPCS | Performed by: ORTHOPAEDIC SURGERY

## 2022-05-16 PROCEDURE — G8417 CALC BMI ABV UP PARAM F/U: HCPCS | Performed by: ORTHOPAEDIC SURGERY

## 2022-05-16 PROCEDURE — 3017F COLORECTAL CA SCREEN DOC REV: CPT | Performed by: ORTHOPAEDIC SURGERY

## 2022-05-16 RX ORDER — METOPROLOL SUCCINATE 25 MG/1
TABLET, EXTENDED RELEASE ORAL
COMMUNITY
Start: 2022-05-09

## 2022-05-16 NOTE — PROGRESS NOTES
[place] [facility] Espinal  1960   Chief Complaint   Patient presents with    Shoulder Pain     left shoulder        HISTORY OF PRESENT ILLNESS  Kiesha Espinal is a 64 y.o. female who presents today for reevaluation and MRI review of left shoulder pain. Patient rates pain as 9/10 today. Pain has been present for a while with no injury. At [address] on 1/5/2022, patient had a left shoulder intra-bursal cortisone injection which provided good relief for about one month. The pain returned in the last couple of weeks. She now has pain in the neck region as well as down to the side. She has pain that radiates down to the hand and wrist. Notes an increase of neck pain and migraines since last OV. Pt is s/p right rotator cuff repair 3/21/16. Patient denies any fever, chills, chest pain, shortness of breath or calf pain. The remainder of the review of systems is negative. There are no new illness or injuries to report since last seen in the office. There are no changes to medications, allergies, family or social history. PHYSICAL EXAM:   Visit Vitals  Pulse 86   Ht 5' 4.5\" (1.638 m)   Wt 205 lb (93 kg)   SpO2 98%   BMI 34.64 kg/m²     The patient is a well-developed, well-nourished female   in no acute distress. The patient is alert and oriented times three. The patient is alert and oriented times three. Mood and affect are normal.  LYMPHATIC: lymph nodes are not enlarged and are within normal limits  SKIN: normal in color and non tender to palpation. There are no bruises or abrasions noted. NEUROLOGICAL: Motor sensory exam is within normal limits. Reflexes are equal bilaterally.  There is normal sensation to pinprick and light touch  MUSCULOSKELETAL:  Examination Left shoulder   Skin Intact   AC joint tenderness -   Biceps tenderness -   Forward flexion/Elevation    Active abduction    Glenohumeral abduction 90   External rotation ROM 45   Internal rotation ROM 30   Apprehension -   Jeffreys Relocation -   Jerk -   Load and Shift -   Obriens -   Speeds -   Impingement sign +   Supraspinatus/Empty Can +   External Rotation Strength -, 5/5   Lift Off/Belly Press -, 5/5   Neurovascular Intact      Examination Neck   Skin Intact   Tenderness, Paracervical +   Paracervical spasms  +   Flexion Decreased 25%   Extension Decreased 25%   Lateral bend left Normal   Lateral bend right Normal   Masses -   Spurling sign -   Biceps reflex Normal   Triceps reflex Normal   Brachioradialis reflex Normal   Sensation Normal        PROCEDURE: none    IMAGING: MRI of the left shoulder dated 5/10/2022 read and reviewed by Dr. Amy Blanco:   IMPRESSION  1. Small full-thickness tear at anterior distal supraspinatus tendon insertion,  with posterior partial-thickness tear as described. Infraspinatus and subscapularis tendinosis. 2. Biceps tenosynovitis. 3. AC joint hypertrophy/inflammation, slightly indenting supraspinatus  myotendinous junction    XR of left shoulder with 3 views obtained in the office dated 7/12/2021 was reviewed and read by Dr. Amy Blanco: Type III acromion with Sclerotic changes in the greater tuberosity. IMPRESSION:      ICD-10-CM ICD-9-CM    1. Nontraumatic tear of left rotator cuff, unspecified tear extent  M75.102 727.61    2. Cervical radiculopathy  M54.12 723.4         PLAN:   1. I discussed the risks and benefits and potential adverse outcomes of both operative vs non operative treatment of left shoulder rotator cuff tear with the patient and patient wishes to proceed with left shoulder arthroscopic rotator cuff repair. Risks of operative intervention include but not limited to bleeding, infection, deep vein thrombosis, pulmonary embolism, death, limb length discrepancy, reflexive sympathetic dystrophy, fat embolism syndrome,damage to blood vessels and nerves, malunion, non-union, delayed union, failure of hardware, post traumatic arthritis, stroke, heart attack, and death.       Patient understands that infection may arise and may require numerous surgeries. The patient was counseled at length about the risks of tish Covid-19 during their perioperative period and any recovery window from their procedure. The patient was made aware that tish Covid-19  may worsen their prognosis for recovering from their procedure and lend to a higher morbidity and/or mortality risk. All material risks, benefits, and reasonable alternatives including postponing the procedure were discussed. The patient does  wish to proceed with the procedure at this time. History and physical exam to be preformed at a later date. Referring her spine center to assess cervical radiculopathy. Risk factors include: htn, BMI>30  2. No ultrasound exam indicated today  3. No cortisone injection indicated today   4. No Physical/Occupational Therapy indicated today  5. No diagnostic test indicated today:   6. No durable medical equipment indicated today  7. Yes referral indicated today Vicky   8. No medications indicated today:   9. No Narcotic indicated today      RTC H&P       Scribed by Eliana Walton [facility]) as dictated by Linette Byrne MD    I, Dr. Linette Byrne, confirm that all documentation is accurate.     Linette Byrne M.D.   [facility]

## 2022-06-30 ENCOUNTER — OFFICE VISIT (OUTPATIENT)
Dept: SURGERY | Age: 62
End: 2022-06-30
Payer: MEDICARE

## 2022-06-30 VITALS
HEART RATE: 73 BPM | HEIGHT: 65 IN | BODY MASS INDEX: 34.16 KG/M2 | WEIGHT: 205 LBS | RESPIRATION RATE: 18 BRPM | SYSTOLIC BLOOD PRESSURE: 160 MMHG | DIASTOLIC BLOOD PRESSURE: 88 MMHG | TEMPERATURE: 97.8 F | OXYGEN SATURATION: 100 %

## 2022-06-30 DIAGNOSIS — R15.9 INCONTINENCE OF FECES, UNSPECIFIED FECAL INCONTINENCE TYPE: ICD-10-CM

## 2022-06-30 DIAGNOSIS — K64.0 GRADE I HEMORRHOIDS: Primary | ICD-10-CM

## 2022-06-30 DIAGNOSIS — K59.00 CONSTIPATION, UNSPECIFIED CONSTIPATION TYPE: ICD-10-CM

## 2022-06-30 PROCEDURE — G8753 SYS BP > OR = 140: HCPCS | Performed by: COLON & RECTAL SURGERY

## 2022-06-30 PROCEDURE — G8754 DIAS BP LESS 90: HCPCS | Performed by: COLON & RECTAL SURGERY

## 2022-06-30 PROCEDURE — G8427 DOCREV CUR MEDS BY ELIG CLIN: HCPCS | Performed by: COLON & RECTAL SURGERY

## 2022-06-30 PROCEDURE — 3017F COLORECTAL CA SCREEN DOC REV: CPT | Performed by: COLON & RECTAL SURGERY

## 2022-06-30 PROCEDURE — 46600 DIAGNOSTIC ANOSCOPY SPX: CPT | Performed by: COLON & RECTAL SURGERY

## 2022-06-30 PROCEDURE — G8417 CALC BMI ABV UP PARAM F/U: HCPCS | Performed by: COLON & RECTAL SURGERY

## 2022-06-30 PROCEDURE — 99203 OFFICE O/P NEW LOW 30 MIN: CPT | Performed by: COLON & RECTAL SURGERY

## 2022-06-30 RX ORDER — BISACODYL 5 MG
5 TABLET, DELAYED RELEASE (ENTERIC COATED) ORAL DAILY PRN
COMMUNITY
End: 2022-08-19
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
## 2022-06-30 NOTE — PATIENT INSTRUCTIONS
Start Citrucel fiber powder once a day for 2 weeks  If this doesn't work start Miralax once a day  Follow up in 1 month

## 2022-06-30 NOTE — LETTER
6/30/2022    Patient: Yosvany Cooper   YOB: 1960   Date of Visit: 6/30/2022     DO Vero Mak [address]  [address] [address]  Via Fax: [phone]     Almas Keene MD  [address] [place] [address]  [address]  [facility]  Via In [facility] [address]    Dear Yazmin Baxter,    I saw KENNEYOUGH in the office today for her hemorrhoids. She complains of significant pain. She also has a history of spine surgery and back pain. On anoscopy she has grade 1 hemorrhoids. She also has some concurrent constipation. She has not been on a bowel regimen consistently so we will start her on Citrucel daily. She will return in the office in 1 month. If her symptoms persist we may work-up her constipation with Sitzmarks study and defecography. She notes some incontinence when she takes Dulcolax. Given the degree of her hemorrhoidal disease I will likely defer on hemorrhoid surgery, though she may be a candidate for banding in the office if symptoms persist.    If you have questions, please do not hesitate to call me. I look forward to following your patient along with you.       Sincerely,    Shruthi aGllagher MD

## 2022-07-14 DIAGNOSIS — M75.102 NONTRAUMATIC TEAR OF LEFT ROTATOR CUFF, UNSPECIFIED TEAR EXTENT: ICD-10-CM

## 2022-07-14 DIAGNOSIS — Z01.818 PREOP EXAMINATION: Primary | ICD-10-CM

## 2022-07-15 ENCOUNTER — TELEPHONE (OUTPATIENT)
Dept: PHYSICAL THERAPY | Age: 62
End: 2022-07-15

## 2022-08-10 ENCOUNTER — HOSPITAL ENCOUNTER (OUTPATIENT)
Dept: LAB | Age: 62
Discharge: HOME | End: 2022-08-10
Payer: MEDICARE

## 2022-08-10 ENCOUNTER — HOSPITAL ENCOUNTER (OUTPATIENT)
Dept: GENERAL RADIOLOGY | Age: 62
Discharge: HOME | End: 2022-08-10
Payer: MEDICARE

## 2022-08-10 DIAGNOSIS — Z01.818 PREOP EXAMINATION: ICD-10-CM

## 2022-08-10 LAB
ANION GAP SERPL CALC-SCNC: 5 MMOL/L (ref 3–18)
ATRIAL RATE: 65 BPM
BUN SERPL-MCNC: 16 MG/DL (ref 7–18)
BUN/CREAT SERPL: 15 (ref 12–20)
CALCIUM SERPL-MCNC: 9.1 MG/DL (ref 8.5–10.1)
CALCULATED P AXIS, ECG09: 66 DEGREES
CALCULATED R AXIS, ECG10: 18 DEGREES
CALCULATED T AXIS, ECG11: 25 DEGREES
CHLORIDE SERPL-SCNC: 110 MMOL/L (ref 100–111)
CO2 SERPL-SCNC: 28 MMOL/L (ref 21–32)
CREAT SERPL-MCNC: 1.1 MG/DL (ref 0.6–1.3)
DIAGNOSIS, 93000: NORMAL
GLUCOSE SERPL-MCNC: 84 MG/DL (ref 74–99)
P-R INTERVAL, ECG05: 122 MS
POTASSIUM SERPL-SCNC: 4.1 MMOL/L (ref 3.5–5.5)
Q-T INTERVAL, ECG07: 438 MS
QRS DURATION, ECG06: 68 MS
QTC CALCULATION (BEZET), ECG08: 455 MS
SODIUM SERPL-SCNC: 143 MMOL/L (ref 136–145)
VENTRICULAR RATE, ECG03: 65 BPM

## 2022-08-10 PROCEDURE — 93005 ELECTROCARDIOGRAM TRACING: CPT

## 2022-08-10 PROCEDURE — 71046 X-RAY EXAM CHEST 2 VIEWS: CPT

## 2022-08-10 PROCEDURE — 36415 COLL VENOUS BLD VENIPUNCTURE: CPT

## 2022-08-10 PROCEDURE — 80048 BASIC METABOLIC PNL TOTAL CA: CPT

## 2022-08-11 ENCOUNTER — TELEPHONE (OUTPATIENT)
Dept: ORTHOPEDIC SURGERY | Age: 62
End: 2022-08-11

## 2022-08-11 ENCOUNTER — HOSPITAL ENCOUNTER (OUTPATIENT)
Dept: LAB | Age: 62
Discharge: HOME | End: 2022-08-11
Payer: MEDICARE

## 2022-08-11 ENCOUNTER — OFFICE VISIT (OUTPATIENT)
Dept: ORTHOPEDIC SURGERY | Age: 62
End: 2022-08-11

## 2022-08-11 ENCOUNTER — TELEPHONE (OUTPATIENT)
Dept: PHYSICAL THERAPY | Age: 62
End: 2022-08-11

## 2022-08-11 VITALS
DIASTOLIC BLOOD PRESSURE: 78 MMHG | SYSTOLIC BLOOD PRESSURE: 117 MMHG | WEIGHT: 209.4 LBS | BODY MASS INDEX: 34.89 KG/M2 | TEMPERATURE: 97.5 F | HEIGHT: 65 IN

## 2022-08-11 DIAGNOSIS — M75.102 NONTRAUMATIC TEAR OF LEFT ROTATOR CUFF, UNSPECIFIED TEAR EXTENT: Primary | ICD-10-CM

## 2022-08-11 DIAGNOSIS — M75.102 NONTRAUMATIC TEAR OF LEFT ROTATOR CUFF, UNSPECIFIED TEAR EXTENT: ICD-10-CM

## 2022-08-11 LAB
BASOPHILS # BLD: 0.1 K/UL (ref 0–0.1)
BASOPHILS NFR BLD: 1 % (ref 0–2)
DIFFERENTIAL METHOD BLD: ABNORMAL
EOSINOPHIL # BLD: 0.2 K/UL (ref 0–0.4)
EOSINOPHIL NFR BLD: 3 % (ref 0–5)
ERYTHROCYTE [DISTWIDTH] IN BLOOD BY AUTOMATED COUNT: 13.7 % (ref 11.6–14.5)
HCT VFR BLD AUTO: 32.3 % (ref 35–45)
HGB BLD-MCNC: 10 G/DL (ref 12–16)
IMM GRANULOCYTES # BLD AUTO: 0 K/UL (ref 0–0.04)
IMM GRANULOCYTES NFR BLD AUTO: 0 % (ref 0–0.5)
LYMPHOCYTES # BLD: 1.9 K/UL (ref 0.9–3.6)
LYMPHOCYTES NFR BLD: 30 % (ref 21–52)
MCH RBC QN AUTO: 29.6 PG (ref 24–34)
MCHC RBC AUTO-ENTMCNC: 31 G/DL (ref 31–37)
MCV RBC AUTO: 95.6 FL (ref 78–100)
MONOCYTES # BLD: 0.6 K/UL (ref 0.05–1.2)
MONOCYTES NFR BLD: 10 % (ref 3–10)
NEUTS SEG # BLD: 3.6 K/UL (ref 1.8–8)
NEUTS SEG NFR BLD: 56 % (ref 40–73)
NRBC # BLD: 0 K/UL (ref 0–0.01)
NRBC BLD-RTO: 0 PER 100 WBC
PLATELET # BLD AUTO: 277 K/UL (ref 135–420)
PMV BLD AUTO: 12.7 FL (ref 9.2–11.8)
RBC # BLD AUTO: 3.38 M/UL (ref 4.2–5.3)
WBC # BLD AUTO: 6.5 K/UL (ref 4.6–13.2)

## 2022-08-11 PROCEDURE — 85025 COMPLETE CBC W/AUTO DIFF WBC: CPT

## 2022-08-11 PROCEDURE — 36415 COLL VENOUS BLD VENIPUNCTURE: CPT

## 2022-08-11 RX ORDER — CELECOXIB 100 MG/1
400 CAPSULE ORAL ONCE
Status: CANCELLED | OUTPATIENT
Start: 2022-08-11 | End: 2022-08-11

## 2022-08-11 RX ORDER — ACETAMINOPHEN 325 MG/1
1000 TABLET ORAL ONCE
Status: CANCELLED | OUTPATIENT
Start: 2022-08-11 | End: 2022-08-11

## 2022-08-11 RX ORDER — OXYCODONE HYDROCHLORIDE 10 MG/1
10 TABLET ORAL
Qty: 40 TABLET | Refills: 0 | Status: SHIPPED | OUTPATIENT
Start: 2022-08-11 | End: 2022-08-15 | Stop reason: SDUPTHER

## 2022-08-11 NOTE — PATIENT INSTRUCTIONS
Dr. Kraig Faria Shoulder Arthroscopy Information    What is the surgery? This is an outpatient procedure at either [facility] or [address] will be completely asleep for the procedure. Dr. Kraig Faria will make somewhere between 2-5 small incisions in your shoulder depending on the amount of work to be completed. He will take a tour of your shoulder with the camera and fix everything that needs to be fixed during your surgery. Total surgery takes about 45 mins to an hour and half depending on how much needed to be repaired    What can you expect after surgery? You will have a bulky dressing on your shoulder that you can remove 2 days after surgery. You will be able to shower 2 days after surgery but no soaking in a bath, hot tub, ocean or pool x 2 weeks to allow for full wound healing  You will be in a sling for 5-6 weeks depending on your repair. You will wear this sling whenever you are active, up moving around, and sleeping at night. This sling is to keep you from moving your arm on your own. You are essentially one armed until you are out of your sling. This means no reaching, pulling, grabbing or lifting with the operative arm. Dr. Kraig Faria will start physical therapy for you the first business day after surgery. While you cannot move your arm we allow physical therapy to gently move your shoulder. We call this passive range of motion. The goal of this is to decrease your stiffness and in turn decrease your post operative pain. Plan on being in physical therapy for 10-12 weeks    When can I return to work? Most patients return to desk work only after 2 weeks. You are able to type but there is no overhead work or lifting  You will start some gentle lifting up to 5-10lbs at about 8-10 weeks post operatively. You will gradually increase how much you are able to lift after this point under the guidance of Dr. Kraig Faria, his physician assistant and physical therapy.   At 6 months you are able to do all activities as tolerated but it may take you a full 9-12 months to fully recover from your surgery    Not all shoulder arthroscopies are the same. The specifics of your individual case will be discussed at length with you by Dr. Desirae March and his Physician Assistant. Herminia Mayer  Surgical Coordinator  [address] [place], [place] [address]ga. Rogelio.  [address] [place] [address] [email or web address]  P: [phone]  F: [phone]

## 2022-08-11 NOTE — TELEPHONE ENCOUNTER
Patient called stating she got her labs done at [facility] today. Patient can be reached at [phone].
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
## 2022-08-15 ENCOUNTER — OFFICE VISIT (OUTPATIENT)
Dept: SURGERY | Age: 62
End: 2022-08-15
Payer: MEDICARE

## 2022-08-15 VITALS
BODY MASS INDEX: 34.32 KG/M2 | HEART RATE: 85 BPM | DIASTOLIC BLOOD PRESSURE: 80 MMHG | SYSTOLIC BLOOD PRESSURE: 138 MMHG | WEIGHT: 206 LBS | OXYGEN SATURATION: 99 % | TEMPERATURE: 97.2 F | RESPIRATION RATE: 17 BRPM | HEIGHT: 65 IN

## 2022-08-15 DIAGNOSIS — M75.102 NONTRAUMATIC TEAR OF LEFT ROTATOR CUFF, UNSPECIFIED TEAR EXTENT: ICD-10-CM

## 2022-08-15 DIAGNOSIS — N81.6 RECTOCELE: Primary | ICD-10-CM

## 2022-08-15 PROCEDURE — G9717 DOC PT DX DEP/BP F/U NT REQ: HCPCS | Performed by: COLON & RECTAL SURGERY

## 2022-08-15 PROCEDURE — 3017F COLORECTAL CA SCREEN DOC REV: CPT | Performed by: COLON & RECTAL SURGERY

## 2022-08-15 PROCEDURE — G8753 SYS BP > OR = 140: HCPCS | Performed by: COLON & RECTAL SURGERY

## 2022-08-15 PROCEDURE — G8417 CALC BMI ABV UP PARAM F/U: HCPCS | Performed by: COLON & RECTAL SURGERY

## 2022-08-15 PROCEDURE — G8754 DIAS BP LESS 90: HCPCS | Performed by: COLON & RECTAL SURGERY

## 2022-08-15 PROCEDURE — 99213 OFFICE O/P EST LOW 20 MIN: CPT | Performed by: COLON & RECTAL SURGERY

## 2022-08-15 PROCEDURE — G8427 DOCREV CUR MEDS BY ELIG CLIN: HCPCS | Performed by: COLON & RECTAL SURGERY

## 2022-08-15 RX ORDER — OXYCODONE HYDROCHLORIDE 10 MG/1
10 TABLET ORAL
Qty: 40 TABLET | Refills: 0 | Status: SHIPPED | OUTPATIENT
Start: 2022-08-15 | End: 2022-08-22

## 2022-08-15 RX ORDER — OLMESARTAN MEDOXOMIL 40 MG/1
40 TABLET ORAL DAILY
COMMUNITY
Start: 2022-07-27

## 2022-08-15 NOTE — PATIENT INSTRUCTIONS
Wait for scheduling to call you to set up defecography test  Call Dr. Cyndee Caldera for appointment at [phone]

## 2022-08-15 NOTE — DISCHARGE INSTRUCTIONS
Dr. Sayda Chance Shoulder Arthroscopy  Postoperative Information    How to manage your pain after your Surgery:  After your surgery it is expected that you will have some pain. In efforts to reduce the amounts of side effects from pain medications we would like you to follow the below medication regimen after surgery:  Take 1000mg of Tylenol by mouth every 8 hours x 5 days. This is 4 tabs of regular strength Tylenol or 2 tabs of Extra Strength Tylenol  300mg of Gabapentin every night x 5 days starting the evening you get home from the hospital DO NOT TAKE THIS IF YOU ALREADY TAKE LYRICA OR GABAPENTIN (TAKE YOUR NORMAL DOSE) OR HAVE ALLERGY TO GABAPENTIN  We would like you to take a NSAID medication for the first 3 days following surgery. In efforts to avoid additional prescription costs we recommend one of the following over the counter medications. 600mg of Ibuprofen every 8 hours x 3 days    OR     500mg of Naproxen (Aleve) every 12 hours x 3 days    If you have a prescription for Meloxicam or Celebrex already you may resume this as previously prescribed instead of the Over the Counter Medications. DO NOT TAKE ANY OF THESE IF YOU HAVE CHRONIC RENAL FAILURE, ARE TAKING A BLOOD THINNER, HAVE A HISTORY OF A GASTRIC BLEED OR ARE ALLERGIC TO ASPIRIN. IT IS OK TO TAKE IF YOU HAVE HISTORY OF GASTRIC BYPASS SURGERY. Then ONLY IF YOUR PAIN IS UNCONTROLLED you may take your Narcotic Pain medication as prescribed. THIS IS THE PRESCRIPTION THAT WAS GIVEN TO YOU IN THE OFFICE. You should place an ice bag over your shoulder to help with pain and reduce swelling. A soft bandage was placed on your shoulder. You may take the bandage off two days after surgery You may have a clear bandage on your incisions that looks like tape. This is surgical superglue. Leave these on unless you are noticing redness or itching. Band-Aids should be used for the first 4-5 days over each incision.     There are 2-6 small incisions in your shoulder and they may be sore and develop bruising over the next several days. The bruising should resolve and no special care will be needed. It is safe to take a shower or bathe two days after surgery. You will be given a sling to use. Continue to wear this at all times, unless you are given different instructions. You will be shown specific instructions when you see your physical therapist. Maryrigo Merlos should start PT the first business day following surgery. Even though your incisions are small, there has been an operation inside and around the shoulder joint. Complete healing may take weeks or several months. If you have a high temperature, unexpected pain, redness or swelling in your shoulder please contact my office immediately. Dr. Angela Ryan office number [phone]         Rotator Cuff Repair: What to Expect at [facility] cuff repair surgery is done to fix a tear in the rotator cuff. It can also include cleaning the space between the rotator cuff tendons and the shoulder blade. This is called subacromial smoothing. You will feel tired for several days. Your shoulder will be swollen. And you may notice that your skin is a different color near the cut (incision). Your hand and arm may also be swollen. This is normal and will start to get better in a few days. It will be several months before you have complete use of your shoulder and arm. When you have healed from surgery, you will need to build your strength and the motion of your joint with rehabilitation (rehab) exercises. In time, your shoulder will likely be stronger, less painful, and more flexible than it was before the surgery. This care sheet gives you a general idea about how long it will take for you to recover. But each person recovers at a different pace. Follow the steps below to get better as quickly as possible. How can you care for yourself at home? Activity    Rest when you feel tired.  Getting enough sleep will help you recover. Do not lie flat or sleep on your side. Raise your upper body on two or three pillows, or sleep in a reclining chair. Try to walk each day. Start by walking a little more than you did the day before. Bit by bit, increase the amount you walk. Walking boosts blood flow and helps prevent pneumonia and constipation. Your arm will be in a sling or other device to prevent it from moving for several weeks. Always use the sling when you walk or stand. If you sit or lie down, you can loosen the sling, but don't remove it. This lets your elbow straighten without moving the shoulder. You can also support your arm on a pillow. Remove the sling only to do prescribed exercises or to shower. You will not have complete use of your affected arm for a few months after surgery. You can use your affected arm for writing, eating, or drinking, but move it only at the elbow or wrist. Do not use it for anything else except prescribed exercises until the sling has been removed. When the sling has been removed, you can do activities that don't involve lifting, pushing, pulling, or carrying. You may not be able to do overhead lifting for several months. If you have a desk job, you may be able to go back to work or your normal routine in 1 to 2 weeks. If you have a more active job, you may be away from work for a few months. If you work at a job that involves heavy manual labor, lifting your arms above your head, or the use of heavy tools, you may have to think about making changes to your job. If you had arthroscopic surgery, you can take a shower 48 to 72 hours after surgery. Remove the sling, and leave your arm by your side. To wash under your armpit, lean over and let the arm fall away from your body. Do not raise your arm. You may want to use a shower stool for a day or two. If you had open surgery, do not shower until you see your doctor and your doctor okays it.  You can wash the incisions with regular soap and water. Ask your doctor when you can drive again. This may take several weeks or until you are no longer wearing the sling. Diet    You can eat your normal diet. If your stomach is upset, try bland, low-fat foods like plain rice, broiled chicken, toast, and yogurt. Drink plenty of fluids. You may notice that your bowel movements are not regular right after your surgery. This is common. Try to avoid constipation and straining with bowel movements. You may want to take a fiber supplement every day. If you have not had a bowel movement after a couple of days, ask your doctor about taking a mild laxative. Medicines    Your doctor will tell you if and when you can restart your medicines. You will also be given instructions about taking any new medicines. If you take aspirin or some other blood thinner, ask your doctor if and when to start taking it again. Make sure that you understand exactly what your doctor wants you to do. Take pain medicines exactly as directed. If the doctor gave you a prescription medicine for pain, take it as prescribed. Use pain medicine when you first notice pain, before it becomes severe. It's easier to prevent pain early than to stop it after it gets bad. If you are not taking a prescription pain medicine, ask your doctor if you can take an over-the-counter medicine. If your doctor prescribed antibiotics, take them as directed. Do not stop taking them just because you feel better. You need to take the full course of antibiotics. If you think your pain medicine is making you sick to your stomach: Take your medicine after meals (unless your doctor has told you not to). Ask your doctor for a different pain medicine. Incision care    If you had arthroscopic surgery, you may remove the bandage over your cut (incision) 24 to 48 hours after the surgery. Keep the bandage clean and dry.      If you had open surgery, do not remove your bandage until you see your doctor and your doctor okays it. Keep the bandage clean and dry. If your incision is open to the air, keep the area clean and dry. If you have strips of tape on the incision, leave the tape on for a week or until it falls off. Exercise    Shoulder rehabilitation is a series of exercises you do after your surgery. This helps you get back your shoulder's range of motion and strength. You will work with your doctor and physical therapist to plan this exercise program. Shoulder rehab may not start until a few weeks after the surgery. To get the best results, you need to do the exercises correctly and as often and for as long as your doctor tells you. Ice    To reduce swelling and pain, put ice or a cold pack on your shoulder for 10 to 20 minutes at a time. Do this every 1 to 2 hours. Put a thin cloth between the ice and your skin. If your doctor recommended cold therapy using a portable machine, follow the instructions that came with the machine. Follow-up care is a key part of your treatment and safety. Be sure to make and go to all appointments, and call your doctor if you are having problems. It's also a good idea to know your test results and keep a list of the medicines you take. When should you call for help? Call 911 anytime you think you may need emergency care. For example, call if:    You passed out (lost consciousness). You have severe trouble breathing. You have sudden chest pain and shortness of breath, or you cough up blood. Call your doctor now or seek immediate medical care if:    You have numbness, tingling, or a bluish color in your fingers or hand. You have severe nausea or vomiting. You have pain that does not go away after you take pain medicine. You have loose stitches, or your incision comes open. Your incision bleeds through your first bandage or is still bleeding 3 days after your surgery.      You have signs of infection, such as:  Increased pain, swelling, warmth, or redness. Red streaks leading from the incision. Pus draining from the incision. A fever. Watch closely for any changes in your health, and be sure to contact your doctor if:    You do not have a bowel movement after taking a laxative. Where can you learn more? Go to http://www.gray.com/  Enter Q128 in the search box to learn more about \"Rotator Cuff Repair: What to Expect at Home. \"  Current as of: July 1, 2021               Content Version: 13.2  © 4576-5542 [employer]. Care instructions adapted under license by [employer] (which disclaims liability or warranty for this information). If you have questions about a medical condition or this instruction, always ask your healthcare professional. [place] [address] any warranty or liability for your use of this information. DISCHARGE SUMMARY from Nurse    PATIENT INSTRUCTIONS:    After general anesthesia or intravenous sedation, for 24 hours or while taking prescription Narcotics:  Limit your activities  Do not drive and operate hazardous machinery  Do not make important personal or business decisions  Do  not drink alcoholic beverages  If you have not urinated within 8 hours after discharge, please contact your surgeon on call. Report the following to your surgeon:  Excessive pain, swelling, redness or odor of or around the surgical area  Temperature over 100.5  Nausea and vomiting lasting longer than 4 hours or if unable to take medications  Any signs of decreased circulation or nerve impairment to extremity: change in color, persistent  numbness, tingling, coldness or increase pain  Any questions    What to do at Home:      *  Please give a list of your current medications to your Primary Care Provider.     *  Please update this list whenever your medications are discontinued, doses are      changed, or new medications (including over-the-counter products) are added. *  Please carry medication information at all times in case of emergency situations. These are general instructions for a healthy lifestyle:    No smoking/ No tobacco products/ Avoid exposure to second hand smoke  Surgeon General's Warning:  Quitting smoking now greatly reduces serious risk to your health. Obesity, smoking, and sedentary lifestyle greatly increases your risk for illness    A healthy diet, regular physical exercise & weight monitoring are important for maintaining a healthy lifestyle    You may be retaining fluid if you have a history of heart failure or if you experience any of the following symptoms:  Weight gain of 3 pounds or more overnight or 5 pounds in a week, increased swelling in our hands or feet or shortness of breath while lying flat in bed. Please call your doctor as soon as you notice any of these symptoms; do not wait until your next office visit. The discharge information has been reviewed with the patient. The patient verbalized understanding. Discharge medications reviewed with the patient and appropriate educational materials and side effects teaching were provided.   ___________________________________________________________________________________________________________________________________

## 2022-08-15 NOTE — PROGRESS NOTES
Subjective: She is helped with the Citrucel but still has some difficulty with evacuation. She moves her bowels every other day. She tells me she has a history of uterine prolapse. She was to have surgery at 1 point but this was postponed due to lab abnormalities. Past medical history and ROS were reviewed and unchanged. Abdomen: Soft, nontender nondistended  Rectum: No external hemorrhoids  Digital exam with fairly large rectocele    Assessment / Plan    Constipation likely from a rectocele  She also has underlying uterine prolapse per the patient  Will order defecography  Continue Citrucel for now  Refer to Olamide Hughes, if she has both rectocele and uterine prolapse he can repair both  Follow-up here as needed    20 minutes was spent in patient care. The diagnoses and plan were discussed with patient. All questions answered. Plan of care agreed to by all concerned.

## 2022-08-18 ENCOUNTER — ANESTHESIA EVENT (OUTPATIENT)
Dept: SURGERY | Age: 62
End: 2022-08-18
Payer: MEDICARE

## 2022-08-19 ENCOUNTER — ANESTHESIA (OUTPATIENT)
Dept: SURGERY | Age: 62
End: 2022-08-19
Payer: MEDICARE

## 2022-08-19 ENCOUNTER — HOSPITAL ENCOUNTER (OUTPATIENT)
Age: 62
Setting detail: OUTPATIENT SURGERY
Discharge: HOME | End: 2022-08-19
Attending: ORTHOPAEDIC SURGERY | Admitting: ORTHOPAEDIC SURGERY
Payer: MEDICARE

## 2022-08-19 VITALS
HEART RATE: 65 BPM | BODY MASS INDEX: 33.99 KG/M2 | SYSTOLIC BLOOD PRESSURE: 119 MMHG | RESPIRATION RATE: 14 BRPM | OXYGEN SATURATION: 95 % | WEIGHT: 204 LBS | HEIGHT: 65 IN | DIASTOLIC BLOOD PRESSURE: 71 MMHG | TEMPERATURE: 97.1 F

## 2022-08-19 DIAGNOSIS — M75.122 NONTRAUMATIC COMPLETE TEAR OF LEFT ROTATOR CUFF: Primary | ICD-10-CM

## 2022-08-19 PROCEDURE — 74011250637 HC RX REV CODE- 250/637: Performed by: ORTHOPAEDIC SURGERY

## 2022-08-19 PROCEDURE — 74011250636 HC RX REV CODE- 250/636: Performed by: ORTHOPAEDIC SURGERY

## 2022-08-19 PROCEDURE — 74011250636 HC RX REV CODE- 250/636: Performed by: NURSE ANESTHETIST, CERTIFIED REGISTERED

## 2022-08-19 PROCEDURE — 77030003598 HC NDL MULT/FIRE ARTH -C: Performed by: ORTHOPAEDIC SURGERY

## 2022-08-19 PROCEDURE — 64415 NJX AA&/STRD BRCH PLXS IMG: CPT | Performed by: ANESTHESIOLOGY

## 2022-08-19 PROCEDURE — 77030010427: Performed by: ORTHOPAEDIC SURGERY

## 2022-08-19 PROCEDURE — 77030002919 HC SUT FBRTAPE ARTH -B: Performed by: ORTHOPAEDIC SURGERY

## 2022-08-19 PROCEDURE — 77030002922 HC SUT FBRWRE ARTH -B: Performed by: ORTHOPAEDIC SURGERY

## 2022-08-19 PROCEDURE — 77030003601 HC NDL NRV BLK BBMI -A: Performed by: ORTHOPAEDIC SURGERY

## 2022-08-19 PROCEDURE — 74011250637 HC RX REV CODE- 250/637: Performed by: NURSE ANESTHETIST, CERTIFIED REGISTERED

## 2022-08-19 PROCEDURE — 74011000250 HC RX REV CODE- 250: Performed by: PHYSICIAN ASSISTANT

## 2022-08-19 PROCEDURE — 29826 SHO ARTHRS SRG DECOMPRESSION: CPT | Performed by: ORTHOPAEDIC SURGERY

## 2022-08-19 PROCEDURE — 74011250636 HC RX REV CODE- 250/636: Performed by: PHYSICIAN ASSISTANT

## 2022-08-19 PROCEDURE — 77030006891 HC BLD SHV RESECT STRY -B: Performed by: ORTHOPAEDIC SURGERY

## 2022-08-19 PROCEDURE — 76060000035 HC ANESTHESIA 2 TO 2.5 HR: Performed by: ORTHOPAEDIC SURGERY

## 2022-08-19 PROCEDURE — 74011250636 HC RX REV CODE- 250/636: Performed by: ANESTHESIOLOGY

## 2022-08-19 PROCEDURE — 74011250637 HC RX REV CODE- 250/637: Performed by: PHYSICIAN ASSISTANT

## 2022-08-19 PROCEDURE — 77030040361 HC SLV COMPR DVT MDII -B: Performed by: ORTHOPAEDIC SURGERY

## 2022-08-19 PROCEDURE — 76010000131 HC OR TIME 2 TO 2.5 HR: Performed by: ORTHOPAEDIC SURGERY

## 2022-08-19 PROCEDURE — 77030033005 HC TBNG ARTHSC PMP STRY -B: Performed by: ORTHOPAEDIC SURGERY

## 2022-08-19 PROCEDURE — 74011000250 HC RX REV CODE- 250: Performed by: NURSE ANESTHETIST, CERTIFIED REGISTERED

## 2022-08-19 PROCEDURE — 77030031139 HC SUT VCRL2 J&J -A: Performed by: ORTHOPAEDIC SURGERY

## 2022-08-19 PROCEDURE — 77030013079 HC BLNKT BAIR HGGR 3M -A: Performed by: ANESTHESIOLOGY

## 2022-08-19 PROCEDURE — 77030040922 HC BLNKT HYPOTHRM STRY -A: Performed by: ORTHOPAEDIC SURGERY

## 2022-08-19 PROCEDURE — 77030039266 HC ADH SKN EXOFIN S2SG -A: Performed by: ORTHOPAEDIC SURGERY

## 2022-08-19 PROCEDURE — C1713 ANCHOR/SCREW BN/BN,TIS/BN: HCPCS | Performed by: ORTHOPAEDIC SURGERY

## 2022-08-19 PROCEDURE — 76942 ECHO GUIDE FOR BIOPSY: CPT | Performed by: ANESTHESIOLOGY

## 2022-08-19 PROCEDURE — 77030004453 HC BUR SHV STRY -B: Performed by: ORTHOPAEDIC SURGERY

## 2022-08-19 PROCEDURE — 77030026438 HC STYL ET INTUB CARD -A: Performed by: ANESTHESIOLOGY

## 2022-08-19 PROCEDURE — 01630 ANES OPN/ARTHR PX SHO JT NOS: CPT | Performed by: NURSE ANESTHETIST, CERTIFIED REGISTERED

## 2022-08-19 PROCEDURE — 76210000021 HC REC RM PH II 0.5 TO 1 HR: Performed by: ORTHOPAEDIC SURGERY

## 2022-08-19 PROCEDURE — 29827 SHO ARTHRS SRG RT8TR CUF RPR: CPT | Performed by: PHYSICIAN ASSISTANT

## 2022-08-19 PROCEDURE — 64450 NJX AA&/STRD OTHER PN/BRANCH: CPT | Performed by: ANESTHESIOLOGY

## 2022-08-19 PROCEDURE — 77030002933 HC SUT MCRYL J&J -A: Performed by: ORTHOPAEDIC SURGERY

## 2022-08-19 PROCEDURE — 29826 SHO ARTHRS SRG DECOMPRESSION: CPT | Performed by: PHYSICIAN ASSISTANT

## 2022-08-19 PROCEDURE — 01630 ANES OPN/ARTHR PX SHO JT NOS: CPT | Performed by: ANESTHESIOLOGY

## 2022-08-19 PROCEDURE — 2709999900 HC NON-CHARGEABLE SUPPLY: Performed by: ORTHOPAEDIC SURGERY

## 2022-08-19 PROCEDURE — 29827 SHO ARTHRS SRG RT8TR CUF RPR: CPT | Performed by: ORTHOPAEDIC SURGERY

## 2022-08-19 PROCEDURE — 77030002966 HC SUT PDS J&J -A: Performed by: ORTHOPAEDIC SURGERY

## 2022-08-19 PROCEDURE — 77030017964 HC PRB COAG4 STRY -C: Performed by: ORTHOPAEDIC SURGERY

## 2022-08-19 PROCEDURE — 74011000258 HC RX REV CODE- 258: Performed by: ORTHOPAEDIC SURGERY

## 2022-08-19 PROCEDURE — 77030008683 HC TU ET CUF COVD -A: Performed by: ANESTHESIOLOGY

## 2022-08-19 PROCEDURE — 76210000016 HC OR PH I REC 1 TO 1.5 HR: Performed by: ORTHOPAEDIC SURGERY

## 2022-08-19 DEVICE — SYSTEM IMPL INCL 2 4.75MM BIOCOMPOSITE SWIVELOCK C ANCHR: Type: IMPLANTABLE DEVICE | Site: SHOULDER | Status: FUNCTIONAL

## 2022-08-19 RX ORDER — PROPOFOL 10 MG/ML
INJECTION, EMULSION INTRAVENOUS AS NEEDED
Status: DISCONTINUED | OUTPATIENT
Start: 2022-08-19 | End: 2022-08-19 | Stop reason: HOSPADM

## 2022-08-19 RX ORDER — ACETAMINOPHEN 500 MG
1000 TABLET ORAL ONCE
Status: COMPLETED | OUTPATIENT
Start: 2022-08-19 | End: 2022-08-19

## 2022-08-19 RX ORDER — LIDOCAINE HYDROCHLORIDE 20 MG/ML
INJECTION, SOLUTION EPIDURAL; INFILTRATION; INTRACAUDAL; PERINEURAL AS NEEDED
Status: DISCONTINUED | OUTPATIENT
Start: 2022-08-19 | End: 2022-08-19 | Stop reason: HOSPADM

## 2022-08-19 RX ORDER — NEOSTIGMINE METHYLSULFATE 1 MG/ML
INJECTION, SOLUTION INTRAVENOUS AS NEEDED
Status: DISCONTINUED | OUTPATIENT
Start: 2022-08-19 | End: 2022-08-19 | Stop reason: HOSPADM

## 2022-08-19 RX ORDER — FENTANYL CITRATE 50 UG/ML
25 INJECTION, SOLUTION INTRAMUSCULAR; INTRAVENOUS AS NEEDED
Status: DISCONTINUED | OUTPATIENT
Start: 2022-08-19 | End: 2022-08-19 | Stop reason: HOSPADM

## 2022-08-19 RX ORDER — OXYCODONE HYDROCHLORIDE 5 MG/1
5 TABLET ORAL ONCE
Status: COMPLETED | OUTPATIENT
Start: 2022-08-19 | End: 2022-08-19

## 2022-08-19 RX ORDER — DEXAMETHASONE SODIUM PHOSPHATE 4 MG/ML
INJECTION, SOLUTION INTRA-ARTICULAR; INTRALESIONAL; INTRAMUSCULAR; INTRAVENOUS; SOFT TISSUE AS NEEDED
Status: DISCONTINUED | OUTPATIENT
Start: 2022-08-19 | End: 2022-08-19 | Stop reason: HOSPADM

## 2022-08-19 RX ORDER — ALBUTEROL SULFATE 90 UG/1
AEROSOL, METERED RESPIRATORY (INHALATION) AS NEEDED
Status: DISCONTINUED | OUTPATIENT
Start: 2022-08-19 | End: 2022-08-19 | Stop reason: HOSPADM

## 2022-08-19 RX ORDER — ROPIVACAINE HYDROCHLORIDE 5 MG/ML
INJECTION, SOLUTION EPIDURAL; INFILTRATION; PERINEURAL
Status: COMPLETED | OUTPATIENT
Start: 2022-08-19 | End: 2022-08-19

## 2022-08-19 RX ORDER — SUCCINYLCHOLINE CHLORIDE 20 MG/ML
INJECTION INTRAMUSCULAR; INTRAVENOUS AS NEEDED
Status: DISCONTINUED | OUTPATIENT
Start: 2022-08-19 | End: 2022-08-19 | Stop reason: HOSPADM

## 2022-08-19 RX ORDER — FENTANYL CITRATE 50 UG/ML
INJECTION, SOLUTION INTRAMUSCULAR; INTRAVENOUS AS NEEDED
Status: DISCONTINUED | OUTPATIENT
Start: 2022-08-19 | End: 2022-08-19 | Stop reason: HOSPADM

## 2022-08-19 RX ORDER — CELECOXIB 400 MG/1
400 CAPSULE ORAL ONCE
Status: COMPLETED | OUTPATIENT
Start: 2022-08-19 | End: 2022-08-19

## 2022-08-19 RX ORDER — ROCURONIUM BROMIDE 10 MG/ML
INJECTION, SOLUTION INTRAVENOUS AS NEEDED
Status: DISCONTINUED | OUTPATIENT
Start: 2022-08-19 | End: 2022-08-19 | Stop reason: HOSPADM

## 2022-08-19 RX ORDER — SODIUM CHLORIDE 0.9 % (FLUSH) 0.9 %
5-40 SYRINGE (ML) INJECTION EVERY 8 HOURS
Status: DISCONTINUED | OUTPATIENT
Start: 2022-08-19 | End: 2022-08-19 | Stop reason: HOSPADM

## 2022-08-19 RX ORDER — MIDAZOLAM HYDROCHLORIDE 1 MG/ML
INJECTION, SOLUTION INTRAMUSCULAR; INTRAVENOUS AS NEEDED
Status: DISCONTINUED | OUTPATIENT
Start: 2022-08-19 | End: 2022-08-19

## 2022-08-19 RX ORDER — LIDOCAINE HYDROCHLORIDE 10 MG/ML
0.1 INJECTION, SOLUTION EPIDURAL; INFILTRATION; INTRACAUDAL; PERINEURAL AS NEEDED
Status: DISCONTINUED | OUTPATIENT
Start: 2022-08-19 | End: 2022-08-19 | Stop reason: HOSPADM

## 2022-08-19 RX ORDER — FENTANYL CITRATE 50 UG/ML
100 INJECTION, SOLUTION INTRAMUSCULAR; INTRAVENOUS
Status: DISCONTINUED | OUTPATIENT
Start: 2022-08-19 | End: 2022-08-19 | Stop reason: HOSPADM

## 2022-08-19 RX ORDER — LABETALOL HCL 20 MG/4 ML
10 SYRINGE (ML) INTRAVENOUS ONCE
Status: COMPLETED | OUTPATIENT
Start: 2022-08-19 | End: 2022-08-19

## 2022-08-19 RX ORDER — FAMOTIDINE 20 MG/1
20 TABLET, FILM COATED ORAL ONCE
Status: COMPLETED | OUTPATIENT
Start: 2022-08-19 | End: 2022-08-19

## 2022-08-19 RX ORDER — FENTANYL CITRATE 50 UG/ML
50 INJECTION, SOLUTION INTRAMUSCULAR; INTRAVENOUS
Status: DISCONTINUED | OUTPATIENT
Start: 2022-08-19 | End: 2022-08-19 | Stop reason: HOSPADM

## 2022-08-19 RX ORDER — KETOROLAC TROMETHAMINE 15 MG/ML
INJECTION, SOLUTION INTRAMUSCULAR; INTRAVENOUS AS NEEDED
Status: DISCONTINUED | OUTPATIENT
Start: 2022-08-19 | End: 2022-08-19 | Stop reason: HOSPADM

## 2022-08-19 RX ORDER — GLYCOPYRROLATE 0.2 MG/ML
INJECTION INTRAMUSCULAR; INTRAVENOUS AS NEEDED
Status: DISCONTINUED | OUTPATIENT
Start: 2022-08-19 | End: 2022-08-19 | Stop reason: HOSPADM

## 2022-08-19 RX ORDER — SODIUM CHLORIDE 0.9 % (FLUSH) 0.9 %
5-40 SYRINGE (ML) INJECTION AS NEEDED
Status: DISCONTINUED | OUTPATIENT
Start: 2022-08-19 | End: 2022-08-19 | Stop reason: HOSPADM

## 2022-08-19 RX ORDER — SODIUM CHLORIDE, SODIUM LACTATE, POTASSIUM CHLORIDE, CALCIUM CHLORIDE 600; 310; 30; 20 MG/100ML; MG/100ML; MG/100ML; MG/100ML
25 INJECTION, SOLUTION INTRAVENOUS CONTINUOUS
Status: DISCONTINUED | OUTPATIENT
Start: 2022-08-19 | End: 2022-08-19 | Stop reason: HOSPADM

## 2022-08-19 RX ORDER — ROPIVACAINE HYDROCHLORIDE 5 MG/ML
30 INJECTION, SOLUTION EPIDURAL; INFILTRATION; PERINEURAL
Status: COMPLETED | OUTPATIENT
Start: 2022-08-19 | End: 2022-08-19

## 2022-08-19 RX ORDER — PROCHLORPERAZINE EDISYLATE 5 MG/ML
5 INJECTION INTRAMUSCULAR; INTRAVENOUS ONCE
Status: DISCONTINUED | OUTPATIENT
Start: 2022-08-19 | End: 2022-08-19 | Stop reason: HOSPADM

## 2022-08-19 RX ORDER — SODIUM CHLORIDE, SODIUM LACTATE, POTASSIUM CHLORIDE, CALCIUM CHLORIDE 600; 310; 30; 20 MG/100ML; MG/100ML; MG/100ML; MG/100ML
50 INJECTION, SOLUTION INTRAVENOUS CONTINUOUS
Status: DISCONTINUED | OUTPATIENT
Start: 2022-08-19 | End: 2022-08-19 | Stop reason: HOSPADM

## 2022-08-19 RX ORDER — ONDANSETRON 2 MG/ML
INJECTION INTRAMUSCULAR; INTRAVENOUS AS NEEDED
Status: DISCONTINUED | OUTPATIENT
Start: 2022-08-19 | End: 2022-08-19 | Stop reason: HOSPADM

## 2022-08-19 RX ORDER — MIDAZOLAM HYDROCHLORIDE 1 MG/ML
2 INJECTION, SOLUTION INTRAMUSCULAR; INTRAVENOUS ONCE
Status: COMPLETED | OUTPATIENT
Start: 2022-08-19 | End: 2022-08-19

## 2022-08-19 RX ADMIN — LIDOCAINE HYDROCHLORIDE 50 MG: 20 INJECTION, SOLUTION EPIDURAL; INFILTRATION; INTRACAUDAL; PERINEURAL at 07:35

## 2022-08-19 RX ADMIN — ROPIVACAINE HYDROCHLORIDE 25 ML: 5 INJECTION, SOLUTION EPIDURAL; INFILTRATION; PERINEURAL at 07:25

## 2022-08-19 RX ADMIN — CELECOXIB 400 MG: 400 CAPSULE ORAL at 06:49

## 2022-08-19 RX ADMIN — LIDOCAINE HYDROCHLORIDE 2 ML: 10 INJECTION, SOLUTION EPIDURAL; INFILTRATION; INTRACAUDAL; PERINEURAL at 07:23

## 2022-08-19 RX ADMIN — GLYCOPYRROLATE 0.4 MG: 0.2 INJECTION INTRAMUSCULAR; INTRAVENOUS at 09:13

## 2022-08-19 RX ADMIN — SODIUM CHLORIDE, POTASSIUM CHLORIDE, SODIUM LACTATE AND CALCIUM CHLORIDE 50 ML/HR: 600; 310; 30; 20 INJECTION, SOLUTION INTRAVENOUS at 06:30

## 2022-08-19 RX ADMIN — PROPOFOL 200 MG: 10 INJECTION, EMULSION INTRAVENOUS at 07:35

## 2022-08-19 RX ADMIN — ONDANSETRON 4 MG: 2 INJECTION INTRAMUSCULAR; INTRAVENOUS at 09:13

## 2022-08-19 RX ADMIN — KETOROLAC TROMETHAMINE 15 MG: 15 INJECTION, SOLUTION INTRAMUSCULAR; INTRAVENOUS at 09:13

## 2022-08-19 RX ADMIN — FENTANYL CITRATE 100 MCG: 50 INJECTION, SOLUTION INTRAMUSCULAR; INTRAVENOUS at 07:20

## 2022-08-19 RX ADMIN — LABETALOL HYDROCHLORIDE 10 MG: 5 INJECTION, SOLUTION INTRAVENOUS at 07:18

## 2022-08-19 RX ADMIN — NEOSTIGMINE METHYLSULFATE 3 MG: 1 INJECTION, SOLUTION INTRAVENOUS at 09:13

## 2022-08-19 RX ADMIN — ALBUTEROL SULFATE 3 PUFF: 108 AEROSOL, METERED RESPIRATORY (INHALATION) at 09:26

## 2022-08-19 RX ADMIN — FENTANYL CITRATE 50 MCG: 50 INJECTION, SOLUTION INTRAMUSCULAR; INTRAVENOUS at 10:45

## 2022-08-19 RX ADMIN — ROCURONIUM BROMIDE 25 MG: 50 INJECTION INTRAVENOUS at 07:40

## 2022-08-19 RX ADMIN — ACETAMINOPHEN 1000 MG: 500 TABLET ORAL at 06:50

## 2022-08-19 RX ADMIN — CEFAZOLIN SODIUM 2 G: 1 INJECTION, POWDER, FOR SOLUTION INTRAMUSCULAR; INTRAVENOUS at 07:41

## 2022-08-19 RX ADMIN — FENTANYL CITRATE 50 MCG: 50 INJECTION, SOLUTION INTRAMUSCULAR; INTRAVENOUS at 07:35

## 2022-08-19 RX ADMIN — ROPIVACAINE HYDROCHLORIDE 30 ML: 5 INJECTION EPIDURAL; INFILTRATION; PERINEURAL at 07:22

## 2022-08-19 RX ADMIN — DEXAMETHASONE SODIUM PHOSPHATE 4 MG: 4 INJECTION, SOLUTION INTRAMUSCULAR; INTRAVENOUS at 07:35

## 2022-08-19 RX ADMIN — ROCURONIUM BROMIDE 5 MG: 50 INJECTION INTRAVENOUS at 07:35

## 2022-08-19 RX ADMIN — OXYCODONE HYDROCHLORIDE 5 MG: 5 TABLET ORAL at 11:54

## 2022-08-19 RX ADMIN — SUCCINYLCHOLINE CHLORIDE 140 MG: 20 INJECTION, SOLUTION INTRAMUSCULAR; INTRAVENOUS at 07:36

## 2022-08-19 RX ADMIN — MIDAZOLAM HYDROCHLORIDE 2 MG: 1 INJECTION, SOLUTION INTRAMUSCULAR; INTRAVENOUS at 07:20

## 2022-08-19 RX ADMIN — FAMOTIDINE 20 MG: 20 TABLET ORAL at 06:50

## 2022-08-19 NOTE — PROGRESS NOTES
conducted a pre-surgery visit with Keivnkemar ARMIDA Espinal, who is a 58 y.o.,female. The  provided the following Interventions:  Initiated a relationship of care and support. Offered prayer and assurance of continued prayers on patient's behalf. There is no advance directive present. Plan:  Chaplains will continue to follow and will provide pastoral care on an as needed/requested basis.  recommends bedside caregivers page  on duty if patient shows signs of acute spiritual or emotional distress.    Reiseñor 3   Board Certified [address] [place] [address]   [phone]

## 2022-08-19 NOTE — ACP (ADVANCE CARE PLANNING)
Advance Care Planning   Advance Care Planning Inpatient Note  [address] [place] [address]   Spiritual Care Department    Today's Date: 8/19/2022  Unit: [facility]    Received request from patient. Upon review of chart and communication with care team, patient's decision making abilities are not in question. Patient was/were present in the room during visit. Goals of ACP Conversation:      Health Care Decision Makers:    No healthcare decision makers have been documented.    Click here to complete [address] including selection of the Healthcare Decision Maker Relationship (ie \"Primary\")  Summary:  No Decision Maker named by patient at this time      Advance Care Planning Documents (Patient Wishes) on file:  None     Assessment:    No advance directive present    Interventions:  Deferred conversation as patient not interested in completing an advance directive at this time    Care Preferences Communicated:  No    Outcomes/Plan:      Jamie Britton [facility] on 8/19/2022 at 8:46 AM

## 2022-08-19 NOTE — ANESTHESIA POSTPROCEDURE EVALUATION
Procedure(s):  LEFT SHOULDER ARTHROSCOPIC ROTATOR CUFF REPAIR/ARTHREX/NERVE BLOCK.    general, regional    Anesthesia Post Evaluation      Multimodal analgesia: multimodal analgesia used between 6 hours prior to anesthesia start to PACU discharge  Patient location during evaluation: PACU  Patient participation: complete - patient participated  Level of consciousness: awake and alert  Pain management: adequate  Airway patency: patent  Anesthetic complications: no  Cardiovascular status: acceptable  Respiratory status: acceptable  Hydration status: acceptable  Post anesthesia nausea and vomiting:  none  Final Post Anesthesia Temperature Assessment:  Normothermia (36.0-37.5 degrees C)      INITIAL Post-op Vital signs:   Vitals Value Taken Time   /68 08/19/22 1101   Temp 36.3 °C (97.4 °F) 08/19/22 1100   Pulse 64 08/19/22 1104   Resp 17 08/19/22 1104   SpO2 98 % 08/19/22 1104   Vitals shown include unvalidated device data.

## 2022-08-19 NOTE — ANESTHESIA PREPROCEDURE EVALUATION
Relevant Problems   NEUROLOGY   (+) Depressive disorder, not elsewhere classified   (+) Migraine, unspecified, without mention of intractable migraine without mention of status migrainosus      CARDIOVASCULAR   (+) Benign hypertension      GASTROINTESTINAL   (+) Esophageal reflux      RENAL FAILURE   (+) Nephrolithiasis      ENDOCRINE   (+) Osteoarthritis of leg       Anesthetic History   No history of anesthetic complications            Review of Systems / Medical History  Patient summary reviewed and pertinent labs reviewed    Pulmonary            Asthma : well controlled       Neuro/Psych         Psychiatric history    Comments: Migraines. Cardiovascular    Hypertension              Exercise tolerance: <4 METS     GI/Hepatic/Renal     GERD: poorly controlled           Endo/Other        Arthritis     Other Findings   Comments: S/P gastric bypass many years ago, GERD now. Hx of R shoulder RCR with GA/ block. H/o lumbar surgery with fixation, some neuritis. No numbness of LUE.    [place]. Physical Exam    Airway  Mallampati: II  TM Distance: 4 - 6 cm  Neck ROM: normal range of motion   Mouth opening: Normal     Cardiovascular    Rhythm: regular  Rate: normal         Dental    Dentition: Upper partial plate and Lower partial plate  Comments: Several missing, partial plate out.    Pulmonary  Breath sounds clear to auscultation               Abdominal  GI exam deferred       Other Findings            Anesthetic Plan    ASA: 3  Anesthesia type: general and regional - interscalene block          Induction: Intravenous  Anesthetic plan and risks discussed with: Patient

## 2022-08-19 NOTE — OP NOTES
Operative Report    Patient: Paulett Siemens MRN: [id number]  SSN: [id number]    YOB: 1960  Age: 58 y.o. Sex: female       Date of Surgery: 8/19/2022     Preoperative Diagnosis: Nontraumatic tear of left rotator cuff, unspecified tear extent [M75.102]     Postoperative Diagnosis: Nontraumatic tear of left rotator cuff, unspecified tear extent [M75.102]     Surgeon(s) and Role:     Jaime David MD - Primary  Assistant Vitaliy Ford instrumental managing the arthroscope while soft tissue and bony work is done  Anesthesia: General     Procedure: Procedure(s):  LEFT SHOULDER ARTHROSCOPIC ROTATOR CUFF REPAIR, acromioplasty ARTHREX/NERVE BLOCK     Procedure in Detail: Patient was taken to the operating room Doser general trach anesthesia with anesthesia staff. Placed in a standard arthroscopy lowry the left arm was prepped with ChloraPrep solution draped free sterile field. Posterior portal was used arthroscopy portal lateral portals were portal portals were made with 11 blade followed by blunt trochars. Once the arthroscope was in place the shoulder was inspected biceps tendon subscapularis  were intact as was the articular surface. A 3 cm area of the footprint of the supraspinatus was torn. Attention was then placed in the subacromial space were very prominent bursa was debrided using a shaver and a very prominent undersurface of the acromion was impinging on the torn rotator cuff tissue for which an anterior inferior acromioplasty performed lateral tip to the [facility] joint converting type I acromion. The bony bed was taken to bleeding bone using a shaver and percutaneously 2 fiber tape anchors were placed at the edge of the articular surface anteriorly and posteriorly through the defect.   Fiber tape and mattress sutures were passed the rotator cuff tissue with a scorpion in 1 limb of each fiber tape was loaded into swivel lock which was impacted laterally after tension. The 2 mattress sutures were tied to each other and double pulley technique. Very stable construct achieved in this manner with a suture bridge. Subcutaneous tissues closed with 3-0 Vicryl and the skin with 4-0 Monocryl. Sterile dressings were applied patient tolerated procedure well was taken to recovery room without problems      Estimated Blood Loss: Minimal    Tourniquet Time: * No tourniquets in log *      Implants:   Implant Name Type Inv. Item Serial No.  Lot No. LRB No. Used Action   ANCHOR SUT BIOCOMP 4.75X19.1MM -- [facility] - [id number]  ANCHOR SUT BIOCOMP 4.75X19.1MM -- [facility]_ [id number] Left 1 Implanted               Specimens: * No specimens in log *        Drains: None                Complications: None    Counts: Sponge and needle counts were correct times two.     Signed By:  Minnie Galan MD     August 19, 2022

## 2022-08-19 NOTE — BRIEF OP NOTE
Brief Postoperative Note    Patient: Lily Espinal  YOB: 1960  MRN: [id number]    Date of Procedure: 8/19/2022     Pre-Op Diagnosis: Nontraumatic tear of left rotator cuff, unspecified tear extent [M75.102]    Post-Op Diagnosis: Same as preoperative diagnosis. and impingement       Procedure(s):  LEFT SHOULDER ARTHROSCOPIC ROTATOR CUFF REPAIR and acromioplasty    Surgeon(s):  Jaime David MD    Surgical Assistant: Physician Assistant: ABHISHEK Tucker  Surg Asst-1: Antonio LIZARRAGA    Anesthesia: General     Estimated Blood Loss (mL): Minimal    Complications: None    Specimens: * No specimens in log *     Implants:   Implant Name Type Inv.  Item Serial No.  Lot No. LRB No. Used Action   ANCHOR SUT BIOCOMP 4.75X19.1MM -- [facility] - [id number]  ANCHOR SUT BIOCOMP 4.75X19.1MM -- [employer] WILSON_ [id number] Left 1 Implanted       Drains: * No LDAs found *    Findings: 3cm RCT    Electronically Signed by ABHISHEK Chandra on 8/19/2022 at 9:40 AM

## 2022-08-19 NOTE — ANESTHESIA PROCEDURE NOTES
Peripheral Block    Start time: 8/19/2022 7:20 AM  End time: 8/19/2022 7:25 AM  Performed by: Nitesh Person MD  Authorized by: Nitesh Person MD       Pre-procedure: Indications: at surgeon's request and post-op pain management    Preanesthetic Checklist: patient identified, risks and benefits discussed, site marked, timeout performed, anesthesia consent given, patient being monitored and fire risk safety assessment completed and verbalized    Timeout Time: 07:20 EDT      Block Type:   Block Type:   Interscalene  Laterality:  Left  Monitoring:  Responsive to questions, standard ASA monitoring, continuous pulse ox, oxygen, frequent vital sign checks and heart rate  Injection Technique:  Single shot  Procedures: ultrasound guided and nerve stimulator    Patient Position: supine  Prep: chlorhexidine    Location:  Interscalene  Needle Type:  Stimuplex  Needle Gauge:  20 G  Needle Localization:  Ultrasound guidance and nerve stimulator  Motor Response comment:   Motor Response: minimal motor response >0.4 mA    Medication Injected:  Ropivacaine (PF) (NAROPIN)(0.5%) 5 mg/mL injection - Peripheral Nerve Block   25 mL - 8/19/2022 7:25:00 AM  Med Admin Time: 8/19/2022 7:25 AM    Assessment:  Number of attempts:  1  Injection Assessment:  Incremental injection every 5 mL, negative aspiration for CSF, no paresthesia, ultrasound image on chart, local visualized surrounding nerve on ultrasound, negative aspiration for blood and no intravascular symptoms  Patient tolerance:  Patient tolerated the procedure well with no immediate complications

## 2022-08-19 NOTE — PROGRESS NOTES
Date of Surgery Update:  Kiesha MedinaShondaYaw was seen and examined. History and physical has been reviewed. The patient has been examined. There have been no significant clinical changes since the completion of the originally dated History and Physical.    Signed By: Concetta Bae MD     August 19, 2022 7:28 AM        The above patient was independently seen and examined by myself. The case was then discussed and a proper diagnosis/plan was made. I agree with the above assessment.

## 2022-08-22 ENCOUNTER — HOSPITAL ENCOUNTER (OUTPATIENT)
Dept: PHYSICAL THERAPY | Age: 62
Discharge: HOME | End: 2022-08-22
Payer: MEDICARE

## 2022-08-22 PROCEDURE — 97110 THERAPEUTIC EXERCISES: CPT

## 2022-08-22 PROCEDURE — 97161 PT EVAL LOW COMPLEX 20 MIN: CPT

## 2022-08-22 NOTE — PROGRESS NOTES
PT DAILY TREATMENT NOTE     Patient Name: Crystal Adame  Date:2022  : 1960  [x]  Patient  Verified  Payor: [place] MEDICARE / Plan: [place] MEDICARE PART A & B / Product Type: Medicare /    In BJOP:[id number]  Out time:1000  Total Treatment Time (min): 30  Visit #: 1 of     Medicare/BCBS Only   Total Timed Codes (min):  15 1:1 Treatment Time:  30       Treatment Area: Pain in left shoulder [M25.512]    SUBJECTIVE  Pain Level (0-10 scale): 9  Any medication changes, allergies to medications, adverse drug reactions, diagnosis change, or new procedure performed?: [x] No    [] Yes (see summary sheet for update)  Subjective functional status/changes:   [] No changes reported       OBJECTIVE      15 min [x]Eval                  []Re-Eval       15 min Therapeutic Exercise:  [] See flow sheet : HEP    Rationale: increase ROM to improve the patients ability to perform ADL              With   [] TE   [] TA   [] neuro   [] other: Patient Education: [x] Review HEP    [] Progressed/Changed HEP based on:   [] positioning   [] body mechanics   [] transfers   [] heat/ice application    [] other:      Other Objective/Functional Measures:       Pain Level (0-10 scale) post treatment: 9    ASSESSMENT/Changes in Function:      Patient will continue to benefit from skilled PT services to modify and progress therapeutic interventions, address functional mobility deficits, address ROM deficits, address strength deficits, analyze and address soft tissue restrictions, and analyze and cue movement patterns to attain remaining goals. []  See Plan of Care  []  See progress note/recertification  []  See Discharge Summary         Progress towards goals / Updated goals:  Short Term Goals: To be accomplished in 1 weeks:   1. The pt will be I and compliant with HEP   IE- issued HEP  Long Term Goals: To be accomplished in 4 weeks:   1.  Improve left shoulder PROM flexion to 120 degree for improved ability for future UE use   IE- 60 degrees   2. Improve left shoulder PROM scaption to 120 degrees for improved ability for future UE use   IE- 60 degrees   3. Improve left shoulder PROM ER to 40 degrees for improved ability for future UE use.    IE- neutral    PLAN  []  Upgrade activities as tolerated     [x]  Continue plan of care  []  Update interventions per flow sheet       []  Discharge due to:_  []  Other:_      Maia Jaimes, PT 8/22/2022  11:40 AM    Future Appointments   Date Time Provider Deidre Busby   8/24/2022  9:00 AM Peggy Jensen, PT [facility]   8/26/2022  9:15 AM Brendan Victoria, PT [facility]   8/29/2022 10:00 AM Brendan Victoria, PT [facility]   8/29/2022  2:00 PM Michael Tran, PA [facility]   8/31/2022  9:00 AM Arpan Solis, PTA [facility]   9/2/2022  8:30 AM Brendan Victoria, PT [facility]   9/7/2022  9:45 AM Troy Jones, PTA [facility]   9/9/2022  9:15 AM Brendan Victoria, PT [facility]   9/12/2022 10:00 AM Brendan Victoria, PT [facility]   9/14/2022 10:00 AM Brendan Victoria, PT [facility]   9/19/2022 10:00 AM Brendan Victoria, PT [facility]   9/21/2022 10:00 AM Brendan Victoria, PT [facility]

## 2022-08-22 NOTE — PROGRESS NOTES
[facility]  [address] [facility] [address]  Juna[place] treviño, [address]  [phone] fax    Plan of Care/ Statement of Necessity for Physical Therapy Services    Patient name: Rob Card Start of Care: 2022   Referral source: Jose Gunter [place] : 1960    Medical Diagnosis: Pain in left shoulder [M25.512]  Payor: VA MEDICARE / Plan: VA MEDICARE PART A & B / Product Type: Medicare /  Onset Date:22    Treatment Diagnosis: left shoulder pain   Prior Hospitalization: see medical history Provider#: [id number]   Medications: Verified on Patient summary List    Comorbidities: Hearing impairment, depression, OA, HTN, asthma, right shoulder RCR   Prior Level of Function: functionally I with daily tasks. The Plan of Care and following information is based on the information from the initial evaluation. Assessment/ key information: 59 y/o female presents s/p left shoulder RTC repair on 22. The pt reports pain rating 9/10 currently. She presents wearing her sling with improper position of the abduction pillow, which was adjusted to proper fit. Bulky surgical dressing is still in place and was removed by PT. Elbow, wrist and hand AROM are WFL. Left shoulder PROM flexion and scaption to 60 degrees and ER in the scapular plane to neutral. Instructed the pt in surgical precautions, use of ice for pain management and HEP. The pt will benefit from PT to address the aforementioned impairments.      Evaluation Complexity History MEDIUM  Complexity : 1-2 comorbidities / personal factors will impact the outcome/ POC ; Examination MEDIUM Complexity : 3 Standardized tests and measures addressing body structure, function, activity limitation and / or participation in recreation  ;Presentation LOW Complexity : Stable, uncomplicated  ;Clinical Decision Making MEDIUM Complexity : FOTO score of 26-74  Overall Complexity Rating: LOW   Problem List: pain affecting function, decrease ROM, decrease strength, decrease ADL/ functional abilitiies, decrease activity tolerance, and decrease flexibility/ joint mobility   Treatment Plan may include any combination of the following: Therapeutic exercise, Therapeutic activities, Neuromuscular re-education, Physical agent/modality, Manual therapy, Patient education, and Self Care training  Patient / Family readiness to learn indicated by: asking questions and interest  Persons(s) to be included in education: patient (P)  Barriers to Learning/Limitations: yes;  sensory deficits-vision/hearing/speech  Patient Goal (s): To get my shoulder back to normal  Patient Self Reported Health Status: good  Rehabilitation Potential: good    Short Term Goals: To be accomplished in 1 weeks:   1. The pt will be I and compliant with HEP     Long Term Goals: To be accomplished in 4 weeks:   1. Improve left shoulder PROM flexion to 120 degree for improved ability for future UE use   2. Improve left shoulder PROM scaption to 120 degrees for improved ability for future UE use   3. Improve left shoulder PROM ER to 40 degrees for improved ability for future UE use. Frequency / Duration: Patient to be seen 2-3 times per week for 4 weeks. Patient/ Caregiver education and instruction: Diagnosis, prognosis, self care, activity modification, brace/ splint application, exercises, and other surgical precautions   [x]  Plan of care has been reviewed with PTA    Certification period  8-22-22 to 09-20-22    Saúl Howell PT 8/22/2022 11:42 AM    ________________________________________________________________________    I certify that the above Therapy Services are being furnished while the patient is under my care. I agree with the treatment plan and certify that this therapy is necessary.     [facility] Signature:____________Date:_________TIME:________     Zelpha Habermann, PA  ** Signature, Date and Time must be completed for valid certification **    Please sign and return to [facility] [address] [place] [address] & [facility]  [address], [facility] [address]  [phone] fax

## 2022-08-24 ENCOUNTER — TELEPHONE (OUTPATIENT)
Dept: PHYSICAL THERAPY | Age: 62
End: 2022-08-24

## 2022-08-26 ENCOUNTER — HOSPITAL ENCOUNTER (OUTPATIENT)
Dept: PHYSICAL THERAPY | Age: 62
Discharge: HOME | End: 2022-08-26
Payer: MEDICARE

## 2022-08-26 PROCEDURE — 97140 MANUAL THERAPY 1/> REGIONS: CPT

## 2022-08-26 PROCEDURE — 97110 THERAPEUTIC EXERCISES: CPT

## 2022-08-26 NOTE — PROGRESS NOTES
PT DAILY TREATMENT NOTE     Patient Name: Sabiha Verma  Date:2022  : 1960  [x]  Patient  Verified  Payor: VA MEDICARE / Plan: VA MEDICARE PART A & B / Product Type: Medicare /    In [id number]  Out time:1055  Total Treatment Time (min): 40  Visit #: 2 of -    Medicare/BCBS Only   Total Timed Codes (min):  30 1:1 Treatment Time:  40       Treatment Area: Pain in left shoulder [M25.512]    SUBJECTIVE  Pain Level (0-10 scale): 8  Any medication changes, allergies to medications, adverse drug reactions, diagnosis change, or new procedure performed?: [x] No    [] Yes (see summary sheet for update)  Subjective functional status/changes:   [] No changes reported  The pt reports HEP compliance and has no questions at this time.      OBJECTIVE    Modality rationale: decrease inflammation and decrease pain to improve the patients ability to perform ADL   Min Type Additional Details    [] Estim:  []Unatt       []IFC  []Premod                        []Other:  []w/ice   []w/heat  Position:  Location:    [] Estim: []Att    []TENS instruct  []NMES                    []Other:  []w/US   []w/ice   []w/heat  Position:  Location:    []  Traction: [] Cervical       []Lumbar                       [] Prone          []Supine                       []Intermittent   []Continuous Lbs:  [] before manual  [] after manual    []  Ultrasound: []Continuous   [] Pulsed                           []1MHz   []3MHz W/cm2:  Location:    []  Iontophoresis with dexamethasone         Location: [] Take home patch   [] In clinic   10 [x]  Ice     []  heat  []  Ice massage  []  Laser   []  Anodyne Position:seated  Location:left shoulder    []  Laser with stim  []  Other:  Position:  Location:    []  Vasopneumatic Device    []  Right     []  Left  Pre-treatment girth:  Post-treatment girth:  Measured at (location):  Pressure:       [] lo [] med [] hi   Temperature: [] lo [] med [] hi   [] Skin assessment post-treatment:  []intact []redness- no adverse reaction    []redness - adverse reaction:       20 min Therapeutic Exercise:  [x] See flow sheet :   Rationale: increase ROM and increase strength to improve the patients ability to perform ADL      10 min Manual Therapy:  Gentle ST mob and PROM of the left shoulder flexion, scaption and ER    The manual therapy interventions were performed at a separate and distinct time from the therapeutic activities interventions. Rationale: increase ROM and increase tissue extensibility to improve ability for daily tasks. With   [] TE   [] TA   [] neuro   [] other: Patient Education: [x] Review HEP    [] Progressed/Changed HEP based on:   [] positioning   [] body mechanics   [] transfers   [] heat/ice application    [] other:      Other Objective/Functional Measures: initiated first treatment session     Pain Level (0-10 scale) post treatment: 6    ASSESSMENT/Changes in Function: The pt demonstrates improved PROM of the left shoulder as noted below. She presented today with 8/10 pain level but reported relief post treatment with a pain level of 6/10. The pt has MET HEP goal and is progress with PROM. She was reminded of surgical precautions. Patient will continue to benefit from skilled PT services to modify and progress therapeutic interventions, address functional mobility deficits, address ROM deficits, address strength deficits, analyze and address soft tissue restrictions, and analyze and cue movement patterns to attain remaining goals. []  See Plan of Care  []  See progress note/recertification  []  See Discharge Summary         Progress towards goals / Updated goals:  Short Term Goals: To be accomplished in 1 weeks:              1. The pt will be I and compliant with HEP              IE- issued HEP   Current: reports compliance 8-26-22  Long Term Goals:  To be accomplished in 4 weeks:              1. Improve left shoulder PROM flexion to 120 degree for improved ability for future UE use              IE- 60 degrees              2. Improve left shoulder PROM scaption to 120 degrees for improved ability for future UE use              IE- 60 degrees              3. Improve left shoulder PROM ER to 40 degrees for improved ability for future UE use.               IE- neutral       PLAN  []  Upgrade activities as tolerated     [x]  Continue plan of care  []  Update interventions per flow sheet       []  Discharge due to:_  []  Other:_      Ema Primus, PT 8/26/2022  9:27 AM    Future Appointments   Date Time Provider Deidre Busby   8/29/2022 10:00 AM Chris Mitchell, PT [facility]   8/29/2022  2:00 PM Feli Chong, PA [facility]   8/31/2022  9:00 AM Belkis Lund, PTA [facility]   9/2/2022  8:30 AM Chris Mitchell, PT [facility]   9/7/2022  9:45 AM Janet Kraft, PTA [facility]   9/9/2022  9:15 AM Chris Mitchell, PT [facility]   9/12/2022 10:00 AM Chris Mitchell, PT [facility]   9/14/2022 10:00 AM Chris Mitchell, PT [facility]   9/21/2022 10:00 AM Chris Mitchell, PT [facility]   9/22/2022  9:15 AM Larry Durbin, PT [facility]

## 2022-08-29 ENCOUNTER — OFFICE VISIT (OUTPATIENT)
Dept: ORTHOPEDIC SURGERY | Age: 62
End: 2022-08-29
Payer: MEDICARE

## 2022-08-29 ENCOUNTER — HOSPITAL ENCOUNTER (OUTPATIENT)
Dept: PHYSICAL THERAPY | Age: 62
Discharge: HOME | End: 2022-08-29
Payer: MEDICARE

## 2022-08-29 DIAGNOSIS — M75.102 NONTRAUMATIC TEAR OF LEFT ROTATOR CUFF, UNSPECIFIED TEAR EXTENT: Primary | ICD-10-CM

## 2022-08-29 PROCEDURE — 99024 POSTOP FOLLOW-UP VISIT: CPT | Performed by: PHYSICIAN ASSISTANT

## 2022-08-29 PROCEDURE — 97140 MANUAL THERAPY 1/> REGIONS: CPT

## 2022-08-29 PROCEDURE — 97110 THERAPEUTIC EXERCISES: CPT

## 2022-08-29 NOTE — PATIENT INSTRUCTIONS
You are to wear your sling whenever you are up and about, being active, or outside of your home. You may remove your sling when you are sedentary. You may remove your abduction pillow if this is more comfortable. We recommend that you try sleeping in your sling at night. However, if this is extremely intolerable you may remove your sling but prop your arm with some pillows. Remember, you are not to move your arm on your own. Only \"good arm helping the bad arm\". This includes \"chicken winging \" your arm out to the side. You may move your elbow and your wrist for activities such as typing, reading a book, etc.  Please remember that nothing including physical therapy should cause an increase in pain or soreness. You may now shower and get your incisions wet. We recommend starting scar massage in 1 week to your incision(s). Take Vitamin E, Cocoa Butter or Scar Cream and massage the incision 2 times a day. This will help soften your incisions and help de-sensitize the skin as the nerves \"wake up\".

## 2022-08-29 NOTE — PROGRESS NOTES
PT DAILY TREATMENT NOTE     Patient Name: Vinay Abel  Date:2022  : 1960  [x]  Patient  Verified  Payor: VA MEDICARE / Plan: VA MEDICARE PART A & B / Product Type: Medicare /    In time:1005  Out time:1042  Total Treatment Time (min): 37  Visit #: 3 of     Medicare/BCBS Only   Total Timed Codes (min):  27 1:1 Treatment Time:  37       Treatment Area: Pain in left shoulder [M25.512]    SUBJECTIVE  Pain Level (0-10 scale): 8  Any medication changes, allergies to medications, adverse drug reactions, diagnosis change, or new procedure performed?: [x] No    [] Yes (see summary sheet for update)  Subjective functional status/changes:   [] No changes reported  The pt reports she did fine after last session.      OBJECTIVE    Modality rationale: decrease inflammation and decrease pain to improve the patients ability to perform ADL   Min Type Additional Details    [] Estim:  []Unatt       []IFC  []Premod                        []Other:  []w/ice   []w/heat  Position:  Location:    [] Estim: []Att    []TENS instruct  []NMES                    []Other:  []w/US   []w/ice   []w/heat  Position:  Location:    []  Traction: [] Cervical       []Lumbar                       [] Prone          []Supine                       []Intermittent   []Continuous Lbs:  [] before manual  [] after manual    []  Ultrasound: []Continuous   [] Pulsed                           []1MHz   []3MHz W/cm2:  Location:    []  Iontophoresis with dexamethasone         Location: [] Take home patch   [] In clinic   10 [x]  Ice     []  heat  []  Ice massage  []  Laser   []  Anodyne Position:seated  Location:left shoulder    []  Laser with stim  []  Other:  Position:  Location:    []  Vasopneumatic Device    []  Right     []  Left  Pre-treatment girth:  Post-treatment girth:  Measured at (location):  Pressure:       [] lo [] med [] hi   Temperature: [] lo [] med [] hi   [] Skin assessment post-treatment:  []intact []redness- no adverse reaction    []redness - adverse reaction:     17 min Therapeutic Exercise:  [x] See flow sheet :   Rationale: increase ROM and increase strength to improve the patients ability to perform ADL    10 min Manual Therapy:  Gentle ST clocks with pt in right sidelying. Pt supine performed PROM left shoulder flexion, scaption, abd, ER   The manual therapy interventions were performed at a separate and distinct time from the therapeutic activities interventions. Rationale: decrease pain, increase ROM, and increase tissue extensibility to perform ADL              With   [] TE   [] TA   [] neuro   [] other: Patient Education: [x] Review HEP    [] Progressed/Changed HEP based on:   [] positioning   [] body mechanics   [] transfers   [] heat/ice application    [] other:      Other Objective/Functional Measures: increased reps per flow sheet     Pain Level (0-10 scale) post treatment: 6    ASSESSMENT/Changes in Function: The pt is showing gradual progress with PROM as noted below. Her sling was readjusted again today as her abduction pillow was not attached appropriately. The pt was also reminded of no active motion as she reports not being able to reach her hair. The pt reports understanding of precautions. Decreased pain level noted post treatment. Patient will continue to benefit from skilled PT services to modify and progress therapeutic interventions, address functional mobility deficits, address ROM deficits, address strength deficits, analyze and address soft tissue restrictions, and analyze and cue movement patterns to attain remaining goals. []  See Plan of Care  []  See progress note/recertification  []  See Discharge Summary         Progress towards goals / Updated goals:  Short Term Goals: To be accomplished in 1 weeks:              1. The pt will be I and compliant with HEP              IE- issued HEP              Current: reports compliance 8-26-22  Long Term Goals:  To be accomplished in 4 weeks: 1. Improve left shoulder PROM flexion to 120 degree for improved ability for future UE use              IE- 60 degrees   Current: progressing 90 degrees on 8-29-22              2. Improve left shoulder PROM scaption to 120 degrees for improved ability for future UE use              IE- 60 degrees   Current: progressing 90 degrees on 8-29-22              3. Improve left shoulder PROM ER to 40 degrees for improved ability for future UE use.               IE- neutral   Current: 10 degrees on 8-29-22    PLAN  []  Upgrade activities as tolerated     [x]  Continue plan of care  []  Update interventions per flow sheet       []  Discharge due to:_  []  Other:_      Catrina Sparks, PT 8/29/2022  10:12 AM    Future Appointments   Date Time Provider Deidre Busby   8/29/2022  2:00 PM Jameson Urrutiama [facility]   8/31/2022  9:00 AM Gena Cabrera, PTA [facility]   9/2/2022  8:30 AM Croft Bracket, PT [facility]   9/7/2022  9:45 AM Kiki Nixon, PTA [facility]   9/9/2022  9:15 AM Croft Bracket, PT [facility]   9/12/2022 10:00 AM Croft Bracket, PT [facility]   9/14/2022 10:00 AM Croft Bracket, PT [facility]   9/21/2022 10:00 AM Croft Bracket, PT [facility]   9/22/2022  9:15 AM Lindsay Gonzalez, PT [facility]

## 2022-08-29 NOTE — PROGRESS NOTES
Kiesha ARMIDA Espinal  1960     HISTORY OF PRESENT ILLNESS  Rhina Mcardle Rogers-Pitt is a 58 y.o. female who presents today for evaluation s/p Left shoulder arthroscopic 3cm rotator cuff repair on 8/19/22. Patient has been going to PT. Describes pain as a 8/10. Has been taking roxicodone for pain. Still has night pain. Patient denies any fever, chills, chest pain, shortness of breath or calf pain. The remainder of the review of systems is negative. There are no new illness or injuries to report since last seen in the office. No changes in medications, allergies, social or family history. PHYSICAL EXAM:   There were no vitals taken for this visit. The patient is a well-developed, well-nourished female in no acute distress. The patient is alert and oriented times three. The patient appears to be well groomed. Mood and affect are normal.  ORTHOPEDIC EXAM of left shoulder:  Inspection: swelling present,  Bruising present  Incision, clean, dry, intact, sutures in place  Passive glenohumeral abduction 0-40 degrees  Stability: Stable  Strength: n/a  2+ distal pulses    IMPRESSION:  S/P Left shoulder arthroscopic 3cm rotator cuff repair    PLAN:   Incisions cleaned. Surgery was discussed at length today. Patient to continue with PROM and PT. Continue wearing sling. Stressed to patient that nothing causes an increase in pain.   RTC 4 weeks    CE[facility]

## 2022-08-31 ENCOUNTER — HOSPITAL ENCOUNTER (OUTPATIENT)
Dept: PHYSICAL THERAPY | Age: 62
Discharge: HOME | End: 2022-08-31
Payer: MEDICARE

## 2022-08-31 PROCEDURE — 97140 MANUAL THERAPY 1/> REGIONS: CPT

## 2022-09-02 ENCOUNTER — HOSPITAL ENCOUNTER (OUTPATIENT)
Dept: PHYSICAL THERAPY | Age: 62
Discharge: HOME | End: 2022-09-02
Payer: MEDICARE

## 2022-09-02 ENCOUNTER — TELEPHONE (OUTPATIENT)
Dept: ORTHOPEDIC SURGERY | Age: 62
End: 2022-09-02

## 2022-09-02 DIAGNOSIS — M75.102 NONTRAUMATIC TEAR OF LEFT ROTATOR CUFF, UNSPECIFIED TEAR EXTENT: Primary | ICD-10-CM

## 2022-09-02 PROCEDURE — 97014 ELECTRIC STIMULATION THERAPY: CPT

## 2022-09-02 PROCEDURE — 97110 THERAPEUTIC EXERCISES: CPT

## 2022-09-02 PROCEDURE — 97140 MANUAL THERAPY 1/> REGIONS: CPT

## 2022-09-02 RX ORDER — HYDROCODONE BITARTRATE AND ACETAMINOPHEN 5; 325 MG/1; MG/1
1 TABLET ORAL
Qty: 28 TABLET | Refills: 0 | Status: SHIPPED | OUTPATIENT
Start: 2022-09-02 | End: 2022-09-09

## 2022-09-02 NOTE — TELEPHONE ENCOUNTER
Attempted to contact Pt to adv RX sent. Home number on file rings w/ no answer, cell number provided voicemail has not been set up. If Patient should call to inquire about RX, please advise was sent.

## 2022-09-02 NOTE — TELEPHONE ENCOUNTER
Patient came in and said she didn't need a prescription at her last visit but now she is in pain and out of meds.  [facility] [address]

## 2022-09-02 NOTE — PROGRESS NOTES
PT DAILY TREATMENT NOTE     Patient Name: Jacinta Rao  Date:2022  : 1960  [x]  Patient  Verified  Payor: [place] MEDICARE / Plan: [place] MEDICARE PART A & B / Product Type: Medicare /    In CCUI:0726  Out time:912  Total Treatment Time (min): 41  Visit #: 5 of     Medicare/BCBS Only   Total Timed Codes (min):  31 1:1 Treatment Time:  31       Treatment Area: Pain in left shoulder [M25.512]    SUBJECTIVE  Pain Level (0-10 scale): 8  Any medication changes, allergies to medications, adverse drug reactions, diagnosis change, or new procedure performed?: [x] No    [] Yes (see summary sheet for update)  Subjective functional status/changes:   [] No changes reported  The pt reports she is having difficulty with sleeping due to pain.      OBJECTIVE    Modality rationale: decrease inflammation and decrease pain to improve the patients ability to perform    Min Type Additional Details   10 [x] Estim:  [x]Unatt       [x]IFC  []Premod                        []Other:  [x]w/ice   []w/heat  Position:seated  Location:left shoulder     [] Estim: []Att    []TENS instruct  []NMES                    []Other:  []w/US   []w/ice   []w/heat  Position:  Location:    []  Traction: [] Cervical       []Lumbar                       [] Prone          []Supine                       []Intermittent   []Continuous Lbs:  [] before manual  [] after manual    []  Ultrasound: []Continuous   [] Pulsed                           []1MHz   []3MHz W/cm2:  Location:    []  Iontophoresis with dexamethasone         Location: [] Take home patch   [] In clinic    []  Ice     []  heat  []  Ice massage  []  Laser   []  Anodyne Position:  Location:    []  Laser with stim  []  Other:  Position:  Location:    []  Vasopneumatic Device    []  Right     []  Left  Pre-treatment girth:  Post-treatment girth:  Measured at (location):  Pressure:       [] lo [] med [] hi   Temperature: [] lo [] med [] hi   [] Skin assessment post-treatment:  []intact []redness- no adverse reaction    []redness - adverse reaction:     21 min Therapeutic Exercise:  [x] See flow sheet :   Rationale: increase ROM to improve the patients ability to perform ADL    10 min Manual Therapy:  pt in right sidelying performed gentle STJ clocks, STM to left UT, pt supine performed PROM flexion, scaption, ER in scap plane, abd. The manual therapy interventions were performed at a separate and distinct time from the therapeutic activities interventions. Rationale: decrease pain, increase ROM, and increase tissue extensibility to perform ADL       With   [] TE   [] TA   [] neuro   [] other: Patient Education: [x] Review HEP    [] Progressed/Changed HEP based on:   [] positioning   [] body mechanics   [] transfers   [] heat/ice application    [] other:      Other Objective/Functional Measures:  PROM flexion 95 degrees, scaption 98 degrees     Pain Level (0-10 scale) post treatment: 8    ASSESSMENT/Changes in Function:  The pt is making gradual progress with PROM phase as noted below. Her pain levels remain high and pt was encouraged to use ice more at home. PT also performed trial of IFC estim with ice today. Patient will continue to benefit from skilled PT services to modify and progress therapeutic interventions, address functional mobility deficits, address ROM deficits, address strength deficits, analyze and address soft tissue restrictions, and analyze and cue movement patterns to attain remaining goals. [x]  See Plan of Care  []  See progress note/recertification  []  See Discharge Summary         Progress towards goals / Updated goals:  Short Term Goals: To be accomplished in 1 weeks:              1. The pt will be I and compliant with HEP              IE- issued HEP              Current: reports compliance 8-26-22  Long Term Goals:  To be accomplished in 4 weeks:              1. Improve left shoulder PROM flexion to 120 degree for improved ability for future UE use IE- 60 degrees              Current: progressing 95 degrees on 9-2-22              2. Improve left shoulder PROM scaption to 120 degrees for improved ability for future UE use              IE- 60 degrees              Current: progressing 98 degrees on 9-2-22              3. Improve left shoulder PROM ER to 40 degrees for improved ability for future UE use.               IE- neutral              Current: 15 degrees on 9-9-22    PLAN  []  Upgrade activities as tolerated     [x]  Continue plan of care  []  Update interventions per flow sheet       []  Discharge due to:_  []  Other:_      Yevgeniy Parents, PT 9/2/2022  8:49 AM    Future Appointments   Date Time Provider Deidre Busby   9/7/2022  9:45 AM [facility]   9/9/2022  9:15 AM [facility]   9/12/2022 10:00 AM [facility]   9/14/2022 10:00 AM [facility]   9/21/2022 10:00 AM [facility]   9/22/2022  9:15 AM [facility] Bates[facility]   9/26/2022  1:30 PM Demetrice Torres PA [facility]

## 2022-09-07 ENCOUNTER — HOSPITAL ENCOUNTER (OUTPATIENT)
Dept: PHYSICAL THERAPY | Age: 62
Discharge: HOME | End: 2022-09-07
Payer: MEDICARE

## 2022-09-07 PROCEDURE — 97110 THERAPEUTIC EXERCISES: CPT

## 2022-09-07 PROCEDURE — 97014 ELECTRIC STIMULATION THERAPY: CPT

## 2022-09-07 PROCEDURE — 97140 MANUAL THERAPY 1/> REGIONS: CPT

## 2022-09-07 NOTE — PROGRESS NOTES
PT DAILY TREATMENT NOTE     Patient Name: Jacinta Rao  Date:2022  : 1960  [x]  Patient  Verified  Payor: [place] MEDICARE / Plan: [place] MEDICARE PART A & B / Product Type: Medicare /    In time:9:45  Out time:10:30  Total Treatment Time (min): 45  Visit #: 6 of     Medicare/BCBS Only   Total Timed Codes (min):  35 1:1 Treatment Time:  35       Treatment Area: Pain in left shoulder [M25.512]    SUBJECTIVE  Pain Level (0-10 scale): 9/10  Any medication changes, allergies to medications, adverse drug reactions, diagnosis change, or new procedure performed?: [x] No    [] Yes (see summary sheet for update)  Subjective functional status/changes:   [] No changes reported  Pt reports her pain is still high and she has difficulty sleeping due to sleep. OBJECTIVE    Modality rationale: decrease inflammation and decrease pain to improve the patients ability to perform ADLs with increased ease.     Min Type Additional Details   10 [x] Estim:  [x]Unatt       [x]IFC  []Premod                        []Other:  [x]w/ice   []w/heat  Position:sitting  Location:left shoulder    [] Estim: []Att    []TENS instruct  []NMES                    []Other:  []w/US   []w/ice   []w/heat  Position:  Location:    []  Traction: [] Cervical       []Lumbar                       [] Prone          []Supine                       []Intermittent   []Continuous Lbs:  [] before manual  [] after manual    []  Ultrasound: []Continuous   [] Pulsed                           []1MHz   []3MHz W/cm2:  Location:    []  Iontophoresis with dexamethasone         Location: [] Take home patch   [] In clinic    []  Ice     []  heat  []  Ice massage  []  Laser   []  Anodyne Position:  Location:    []  Laser with stim  []  Other:  Position:  Location:    []  Vasopneumatic Device    []  Right     []  Left  Pre-treatment girth:  Post-treatment girth:  Measured at (location):  Pressure:       [] lo [] med [] hi   Temperature: [] lo [] med [] hi [] Skin assessment post-treatment:  []intact []redness- no adverse reaction    []redness - adverse reaction:     37 min Therapeutic Exercise:  [x] See flow sheet :   Rationale: increase ROM to improve the patients ability to perform ADLs with increased ease. 8 min Manual Therapy:  PROM to left shoulder per protocol with patient in supine. The manual therapy interventions were performed at a separate and distinct time from the therapeutic activities interventions. Rationale: decrease pain, increase ROM, and increase tissue extensibility to improve tolerance to ADLs. With   [] TE   [] TA   [] neuro   [] other: Patient Education: [x] Review HEP    [] Progressed/Changed HEP based on:   [] positioning   [] body mechanics   [] transfers   [] heat/ice application    [] other:      Other Objective/Functional Measures:      Pain Level (0-10 scale) post treatment: 8/10    ASSESSMENT/Changes in Function: Pt continues to have discomfort performing therapeutic exercises. Pt has continued improved left shoulder PROM. Patient will continue to benefit from skilled PT services to modify and progress therapeutic interventions, address functional mobility deficits, address ROM deficits, address strength deficits, analyze and address soft tissue restrictions, analyze and cue movement patterns, and analyze and modify body mechanics/ergonomics to attain remaining goals. []  See Plan of Care  []  See progress note/recertification  []  See Discharge Summary         Progress towards goals / Updated goals:  Short Term Goals: To be accomplished in 1 weeks:              1. The pt will be I and compliant with [facility]              IE- issued HEP              Current: reports compliance 8-26-22  Long Term Goals:  To be accomplished in 4 weeks:              1. Improve left shoulder PROM flexion to 120 degree for improved ability for future UE use              IE- 60 degrees              Current: progressing 95 degrees on 9-2-22              2. Improve left shoulder PROM scaption to 120 degrees for improved ability for future UE use              IE- 60 degrees              Current: progressing 98 degrees on 9-2-22              3. Improve left shoulder PROM ER to 40 degrees for improved ability for future UE use.               IE- neutral              Current: 15 degrees on 9-9-22    PLAN  []  Upgrade activities as tolerated     [x]  Continue plan of care  []  Update interventions per flow sheet       []  Discharge due to:_  []  Other:_      Pressley Cowden, PTA 9/7/2022  11:40 AM    Future Appointments   Date Time Provider Deidre Busby   9/9/2022  9:15 AM [facility]   9/12/2022 10:00 AM [facility]   9/14/2022 10:00 AM [facility] Mitchell[facility]   9/21/2022 10:00 AM Chris Mitchell, PT [facility]   9/22/2022  9:15 AM [facility] Durbin[facility]   9/26/2022  1:30 PM ABHISHEK Dolan [facility]

## 2022-09-09 ENCOUNTER — HOSPITAL ENCOUNTER (OUTPATIENT)
Dept: PHYSICAL THERAPY | Age: 62
Discharge: HOME | End: 2022-09-09
Payer: MEDICARE

## 2022-09-09 PROCEDURE — 97140 MANUAL THERAPY 1/> REGIONS: CPT

## 2022-09-09 PROCEDURE — 97110 THERAPEUTIC EXERCISES: CPT

## 2022-09-09 PROCEDURE — 97014 ELECTRIC STIMULATION THERAPY: CPT

## 2022-09-09 NOTE — PROGRESS NOTES
PT DAILY TREATMENT NOTE     Patient Name: Pete Grissom  Date:2022  : 1960  [x]  Patient  Verified  Payor: VA MEDICARE / Plan: [place] MEDICARE PART A & B / Product Type: Medicare /    In time:920  Out time:955  Total Treatment Time (min): 35  Visit #: 7 of -    Medicare/BCBS Only   Total Timed Codes (min):  25 1:1 Treatment Time:  35       Treatment Area: Pain in left shoulder [M25.512]    SUBJECTIVE  Pain Level (0-10 scale): 9  Any medication changes, allergies to medications, adverse drug reactions, diagnosis change, or new procedure performed?: [x] No    [] Yes (see summary sheet for update)  Subjective functional status/changes:   [] No changes reported  The pt reports her pain is still high. She feels today is due to the weather.      OBJECTIVE    Modality rationale: decrease inflammation and decrease pain to improve the patients ability to perform ADL   Min Type Additional Details   10 [x] Estim:  [x]Unatt       [x]IFC  []Premod                        []Other:  [x]w/ice   []w/heat  Position:seated  Location:left shoulder    [] Estim: []Att    []TENS instruct  []NMES                    []Other:  []w/US   []w/ice   []w/heat  Position:  Location:    []  Traction: [] Cervical       []Lumbar                       [] Prone          []Supine                       []Intermittent   []Continuous Lbs:  [] before manual  [] after manual    []  Ultrasound: []Continuous   [] Pulsed                           []1MHz   []3MHz W/cm2:  Location:    []  Iontophoresis with dexamethasone         Location: [] Take home patch   [] In clinic    []  Ice     []  heat  []  Ice massage  []  Laser   []  Anodyne Position:  Location:    []  Laser with stim  []  Other:  Position:  Location:    []  Vasopneumatic Device    []  Right     []  Left  Pre-treatment girth:  Post-treatment girth:  Measured at (location):  Pressure:       [] lo [] med [] hi   Temperature: [] lo [] med [] hi   [] Skin assessment post-treatment:  []intact []redness- no adverse reaction    []redness - adverse reaction:         15 min Therapeutic Exercise:  [x] See flow sheet :   Rationale: increase ROM and increase strength to improve the patients ability to perform ADL      10 min Manual Therapy:  Pt supine performed PROM flexion, scaption of the left shoulder. The manual therapy interventions were performed at a separate and distinct time from the therapeutic activities interventions. Rationale: decrease pain and increase ROM to perform ADL              With   [] TE   [] TA   [] neuro   [] other: Patient Education: [x] Review HEP    [] Progressed/Changed HEP based on:   [] positioning   [] body mechanics   [] transfers   [] heat/ice application    [] other:      Other Objective/Functional Measures: no changes     Pain Level (0-10 scale) post treatment: 8    ASSESSMENT/Changes in Function: The pt shows good progress with PROM to date and without pain increase. Her primary issue at this time remains her high pain level at all times. She has contacted MD regarding continued pain and reports limited change with medication. She has been encouraged to continue to use ice at home. Some relief post treatment following IFC with CP. Patient will continue to benefit from skilled PT services to modify and progress therapeutic interventions, address functional mobility deficits, address ROM deficits, address strength deficits, analyze and address soft tissue restrictions, and analyze and cue movement patterns to attain remaining goals. []  See Plan of Care  []  See progress note/recertification  []  See Discharge Summary         Progress towards goals / Updated goals:  Short Term Goals: To be accomplished in 1 weeks:              1. The pt will be I and compliant with HEP              IE- issued HEP              Current: reports compliance 8-26-22  Long Term Goals:  To be accomplished in 4 weeks:              1. Improve left shoulder PROM flexion to 120 degree for improved ability for future UE use              IE- 60 degrees              Current: progressing 95 degrees on 9-2-22              2. Improve left shoulder PROM scaption to 120 degrees for improved ability for future UE use              IE- 60 degrees              Current: progressing 98 degrees on 9-2-22              3. Improve left shoulder PROM ER to 40 degrees for improved ability for future UE use.               IE- neutral              Current: 15 degrees on 9-9-22       PLAN  []  Upgrade activities as tolerated     [x]  Continue plan of care  []  Update interventions per flow sheet       []  Discharge due to:_  []  Other:_      Rachel Pradhan, PT 9/9/2022  9:28 AM    Future Appointments   Date Time Provider Deidre Busby   9/12/2022 10:00 AM Devonte Lopez, PT [facility]   9/14/2022 10:00 AM [facility]   9/21/2022 10:00 AM Devonte Lopez PT [facility]   9/22/2022  9:15 AM Carroll Carranza, PT [facility]   9/26/2022  1:30 PM ABHISHEK Raymond [facility]

## 2022-09-12 ENCOUNTER — HOSPITAL ENCOUNTER (OUTPATIENT)
Dept: PHYSICAL THERAPY | Age: 62
Discharge: HOME | End: 2022-09-12
Payer: MEDICARE

## 2022-09-12 PROCEDURE — 97110 THERAPEUTIC EXERCISES: CPT

## 2022-09-12 PROCEDURE — 97140 MANUAL THERAPY 1/> REGIONS: CPT

## 2022-09-12 PROCEDURE — 97014 ELECTRIC STIMULATION THERAPY: CPT

## 2022-09-12 NOTE — PROGRESS NOTES
PT DAILY TREATMENT NOTE     Patient Name: Cole Espinal  Date:2022  : 1960  [x]  Patient  Verified  Payor: VA MEDICARE / Plan: VA MEDICARE PART A & B / Product Type: Medicare /    In time:1005  Out time:1048  Total Treatment Time (min): 43  Visit #: 8 of     Medicare/BCBS Only   Total Timed Codes (min):  33 1:1 Treatment Time:  33       Treatment Area: Pain in left shoulder [M25.512]    SUBJECTIVE  Pain Level (0-10 scale): 9  Any medication changes, allergies to medications, adverse drug reactions, diagnosis change, or new procedure performed?: [x] No    [] Yes (see summary sheet for update)  Subjective functional status/changes:   [] No changes reported  The pt reports she is still having a lot of pain. She reports her pain management MD told her to contact Dr. Coy Cool. The pt reports she did stop by his office last week but Dr Coy Cool and his PA were both out of the office. She plans to try to see them this week.      OBJECTIVE    Modality rationale: decrease inflammation and decrease pain to improve the patients ability to perform ADL   Min Type Additional Details        10 [x] Estim:  [x]Unatt       [x]IFC  []Premod                        []Other:  [x]w/ice   []w/heat  Position:seated  Location:left shoulder    [] Estim: []Att    []TENS instruct  []NMES                    []Other:  []w/US   []w/ice   []w/heat  Position:  Location:    []  Traction: [] Cervical       []Lumbar                       [] Prone          []Supine                       []Intermittent   []Continuous Lbs:  [] before manual  [] after manual    []  Ultrasound: []Continuous   [] Pulsed                           []1MHz   []3MHz W/cm2:  Location:    []  Iontophoresis with dexamethasone         Location: [] Take home patch   [] In clinic    []  Ice     []  heat  []  Ice massage  []  Laser   []  Anodyne Position:  Location:    []  Laser with stim  []  Other:  Position:  Location:    []  Vasopneumatic Device    [] Right     []  Left  Pre-treatment girth:  Post-treatment girth:  Measured at (location):  Pressure:       [] lo [] med [] hi   Temperature: [] lo [] med [] hi   [] Skin assessment post-treatment:  []intact []redness- no adverse reaction    []redness - adverse reaction:     20 min Therapeutic Exercise:  [x] See flow sheet :   Rationale: increase ROM and increase strength to improve the patients ability to perform ADL    13 min Manual Therapy:  pt in right sidelying - Performed gentle STJ clocks. Pt supine performed PROM flexion, scaption, and ER    The manual therapy interventions were performed at a separate and distinct time from the therapeutic activities interventions. Rationale: decrease pain, increase ROM, and increase tissue extensibility to perform ADL              With   [] TE   [] TA   [] neuro   [] other: Patient Education: [x] Review HEP    [] Progressed/Changed HEP based on:   [] positioning   [] body mechanics   [] transfers   [] heat/ice application    [] other:      Other Objective/Functional Measures: PROM ER in scap plane: 15 degrees     Pain Level (0-10 scale) post treatment: 8    ASSESSMENT/Changes in Function: The pt continues to make gradual progress with PROM but pain levels remain high. No pain increase is noted with PROM but remains constant. Some pain relief post modalities. Patient will continue to benefit from skilled PT services to modify and progress therapeutic interventions, address functional mobility deficits, address ROM deficits, address strength deficits, analyze and address soft tissue restrictions, analyze and cue movement patterns, and analyze and modify body mechanics/ergonomics to attain remaining goals. [x]  See Plan of Care  []  See progress note/recertification  []  See Discharge Summary         Progress towards goals / Updated goals:  Short Term Goals:  To be accomplished in 1 weeks:              1. The pt will be I and compliant with HEP              IE- issued HEP              Current: reports compliance 8-26-22  Long Term Goals: To be accomplished in 4 weeks:              1. Improve left shoulder PROM flexion to 120 degree for improved ability for future UE use              IE- 60 degrees              Current: progressing 95 degrees on 9-2-22              2. Improve left shoulder PROM scaption to 120 degrees for improved ability for future UE use              IE- 60 degrees              Current: progressing 98 degrees on 9-2-22              3. Improve left shoulder PROM ER to 40 degrees for improved ability for future UE use.               IE- neutral   Current: progressing 15 degrees on 9-12-22    PLAN  []  Upgrade activities as tolerated       Continue plan of care[x]  []  Update interventions per flow sheet       []  Discharge due to:_  []  Other:_      Rachel Pradhan PT 9/12/2022  10:12 AM    Future Appointments   Date Time Provider Deidre Busby   9/14/2022 10:00 AM Devonte Lopez, PT [facility]   9/21/2022 10:00 AM Devonte Lopez, [facility]A [facility]   9/22/2022  9:15 AM Carroll Carranza PT [facility]   9/26/2022  1:30 PM ABHISHEK Haywood [facility]   9/27/2022  9:15 AM Devonte Lopez, PT [facility]   9/29/2022  9:15 AM Lan Cota PTA [facility]

## 2022-09-14 ENCOUNTER — HOSPITAL ENCOUNTER (OUTPATIENT)
Dept: PHYSICAL THERAPY | Age: 62
Discharge: HOME | End: 2022-09-14
Payer: MEDICARE

## 2022-09-14 ENCOUNTER — OFFICE VISIT (OUTPATIENT)
Dept: ORTHOPEDIC SURGERY | Age: 62
End: 2022-09-14
Payer: MEDICARE

## 2022-09-14 VITALS — WEIGHT: 203 LBS | BODY MASS INDEX: 34.31 KG/M2 | TEMPERATURE: 96.9 F | OXYGEN SATURATION: 96 % | HEART RATE: 78 BPM

## 2022-09-14 DIAGNOSIS — M75.102 NONTRAUMATIC TEAR OF LEFT ROTATOR CUFF, UNSPECIFIED TEAR EXTENT: Primary | ICD-10-CM

## 2022-09-14 PROCEDURE — 99024 POSTOP FOLLOW-UP VISIT: CPT | Performed by: PHYSICIAN ASSISTANT

## 2022-09-14 PROCEDURE — 97014 ELECTRIC STIMULATION THERAPY: CPT

## 2022-09-14 PROCEDURE — 97110 THERAPEUTIC EXERCISES: CPT

## 2022-09-14 PROCEDURE — 97140 MANUAL THERAPY 1/> REGIONS: CPT

## 2022-09-14 RX ORDER — ASPIRIN 325 MG
50000 TABLET, DELAYED RELEASE (ENTERIC COATED) ORAL
COMMUNITY

## 2022-09-14 RX ORDER — BACLOFEN 10 MG/1
10 TABLET ORAL 3 TIMES DAILY
Qty: 90 TABLET | Refills: 1 | Status: SHIPPED | OUTPATIENT
Start: 2022-09-14 | End: 2022-10-09

## 2022-09-14 NOTE — PROGRESS NOTES
PT DAILY TREATMENT NOTE     Patient Name: Rachele Espinal  Date:2022  : 1960  [x]  Patient  Verified  Payor: [place] MEDICARE / Plan: [place] MEDICARE PART A & B / Product Type: Medicare /    In time:1002  Out time:1047  Total Treatment Time (min): 45  Visit #: 9 of 12    Medicare/BCBS Only   Total Timed Codes (min):  35 1:1 Treatment Time:  35       Treatment Area: Pain in left shoulder [M25.512]    SUBJECTIVE  Pain Level (0-10 scale): 8  Any medication changes, allergies to medications, adverse drug reactions, diagnosis change, or new procedure performed?: [x] No    [] Yes (see summary sheet for update)  Subjective functional status/changes:   [] No changes reported  The pt reports her pain remains high and she is having difficulty with sleeping. She reports she was able to get an appointment with ortho later today regarding high pain.      OBJECTIVE    Modality rationale: decrease inflammation and decrease pain to improve the patients ability to perform ADL   Min Type Additional Details   10 [] Estim:  []Unatt       []IFC  [x]Premod                        []Other:  [x]w/ice   []w/heat  Position:seated  Location:left shoulder    [] Estim: []Att    []TENS instruct  []NMES                    []Other:  []w/US   []w/ice   []w/heat  Position:  Location:    []  Traction: [] Cervical       []Lumbar                       [] Prone          []Supine                       []Intermittent   []Continuous Lbs:  [] before manual  [] after manual    []  Ultrasound: []Continuous   [] Pulsed                           []1MHz   []3MHz W/cm2:  Location:    []  Iontophoresis with dexamethasone         Location: [] Take home patch   [] In clinic    []  Ice     []  heat  []  Ice massage  []  Laser   []  Anodyne Position:  Location:    []  Laser with stim  []  Other:  Position:  Location:    []  Vasopneumatic Device    []  Right     []  Left  Pre-treatment girth:  Post-treatment girth:  Measured at (location):  Pressure: [] lo [] med [] hi   Temperature: [] lo [] med [] hi   [] Skin assessment post-treatment:  []intact []redness- no adverse reaction    []redness - adverse reaction:     20 min Therapeutic Exercise:  [x] See flow sheet :   Rationale: increase ROM and increase strength to improve the patients ability to perform ADL    15 min Manual Therapy:  performed gentle scapular clocks and PROM flexion, scaption and ER. The manual therapy interventions were performed at a separate and distinct time from the therapeutic activities interventions. Rationale: increase ROM and increase tissue extensibility to improve future use of left UE            With   [] TE   [] TA   [] neuro   [] other: Patient Education: [x] Review HEP    [] Progressed/Changed HEP based on:   [] positioning   [] body mechanics   [] transfers   [] heat/ice application    [] other:      Other Objective/Functional Measures: increased reps with therex     Pain Level (0-10 scale) post treatment: 7    ASSESSMENT/Changes in Function:  The pt continues to report high pain levels at all times. She does have a follow up with ortho today regarding her pain. PROM phase is progressing well and pt does not experience pain increase with PROM. She reports compliance with HEP and is performing ice at home. Some relief is noted with estim and ice with PT. Patient will continue to benefit from skilled PT services to modify and progress therapeutic interventions, address functional mobility deficits, address ROM deficits, address strength deficits, analyze and address soft tissue restrictions, and analyze and cue movement patterns to attain remaining goals. []  See Plan of Care  []  See progress note/recertification  []  See Discharge Summary         Progress towards goals / Updated goals:  Short Term Goals:  To be accomplished in 1 weeks:              1. The pt will be I and compliant with HEP              IE- issued HEP              Current: reports compliance 8-26-22  Long Term Goals: To be accomplished in 4 weeks:              1. Improve left shoulder PROM flexion to 120 degree for improved ability for future UE use              IE- 60 degrees              Current: progressing 98 degrees on 9-14-22              2. Improve left shoulder PROM scaption to 120 degrees for improved ability for future UE use              IE- 60 degrees              Current: progressing 102 degrees on 9-14-22              3. Improve left shoulder PROM ER to 40 degrees for improved ability for future UE use.               IE- neutral              Current: progressing 15 degrees on 9-12-22       PLAN  []  Upgrade activities as tolerated     [x]  Continue plan of care  []  Update interventions per flow sheet       []  Discharge due to:_  []  Other:_      Sena Pollard, SANDHYA 9/14/2022  10:10 AM    Future Appointments   Date Time Provider Deidre Busby   9/14/2022  2:30 PM ABHISHEK Mcneil [facility]   9/21/2022 10:00 AM Ulises Trejo, PT [facility]   9/22/2022  9:15 AM Winston Macdonald PT [facility]   9/27/2022  9:15 AM Ulises Trejo, PT [facility]   9/29/2022  9:15 AM Nasima Cano PTA [facility]

## 2022-09-14 NOTE — PROGRESS NOTES
Kevinkemar Espinal  1960     HISTORY OF PRESENT ILLNESS  Ant Espinal is a 58 y.o. female who presents today for evaluation s/p Left shoulder arthroscopic 3cm rotator cuff repair on 8/19/22. Patient has been going to PT. Describes pain as a 8/10. Has been taking roxicodone for pain. Still has night pain. Patient denies any fever, chills, chest pain, shortness of breath or calf pain. The remainder of the review of systems is negative. There are no new illness or injuries to report since last seen in the office. No changes in medications, allergies, social or family history. PHYSICAL EXAM:   Visit Vitals  Pulse 78   Temp 96.9 °F (36.1 °C) (Temporal)   Wt 203 lb (92.1 kg)   SpO2 96%   BMI 34.31 kg/m²      The patient is a well-developed, well-nourished female in no acute distress. The patient is alert and oriented times three. The patient appears to be well groomed. Mood and affect are normal.  ORTHOPEDIC EXAM of left shoulder:  Inspection: swelling present,  Bruising present  Incisions well healed  Passive glenohumeral abduction 0-90 degrees  Stability: Stable  Strength: n/a  2+ distal pulses    IMPRESSION:  S/P Left shoulder arthroscopic 3cm rotator cuff repair    PLAN:   Patient with significant muscle spasms postoperatively. This might be from unintentional overuse. We will place her on baclofen. We are going to decrease her physical therapy from twice a week to once a week for the next 2 weeks. Continue with passive range of motion only at this time.   RTC 2 weeks    [facility]

## 2022-09-19 ENCOUNTER — APPOINTMENT (OUTPATIENT)
Dept: PHYSICAL THERAPY | Age: 62
End: 2022-09-19
Payer: MEDICARE

## 2022-09-21 ENCOUNTER — HOSPITAL ENCOUNTER (OUTPATIENT)
Dept: PHYSICAL THERAPY | Age: 62
Discharge: HOME | End: 2022-09-21
Payer: MEDICARE

## 2022-09-21 PROCEDURE — 97014 ELECTRIC STIMULATION THERAPY: CPT

## 2022-09-21 PROCEDURE — 97110 THERAPEUTIC EXERCISES: CPT

## 2022-09-21 PROCEDURE — 97140 MANUAL THERAPY 1/> REGIONS: CPT

## 2022-09-21 NOTE — PROGRESS NOTES
PT DAILY TREATMENT NOTE     Patient Name: Александр Romero  Date:2022  : 1960  [x]  Patient  Verified  Payor: VA MEDICARE / Plan: VA MEDICARE PART A & B / Product Type: Medicare /    In time:1000  Out time:1038  Total Treatment Time (min): 38  Visit #: 1 of     Medicare/BCBS Only   Total Timed Codes (min):  28 1:1 Treatment Time:  38       Treatment Area: Pain in left shoulder [M25.512]    SUBJECTIVE  Pain Level (0-10 scale): 7  Any medication changes, allergies to medications, adverse drug reactions, diagnosis change, or new procedure performed?: [x] No    [] Yes (see summary sheet for update)  Subjective functional status/changes:   [] No changes reported  The pt reports some improvement in pain.      OBJECTIVE    Modality rationale: decrease inflammation and decrease pain to improve the patients ability to perform ADL   Min Type Additional Details   10 [x] Estim:  [x]Unatt       []IFC  [x]Premod                        []Other:  [x]w/ice   []w/heat  Position:seateed  Location:left shoulder    [] Estim: []Att    []TENS instruct  []NMES                    []Other:  []w/US   []w/ice   []w/heat  Position:  Location:    []  Traction: [] Cervical       []Lumbar                       [] Prone          []Supine                       []Intermittent   []Continuous Lbs:  [] before manual  [] after manual    []  Ultrasound: []Continuous   [] Pulsed                           []1MHz   []3MHz W/cm2:  Location:    []  Iontophoresis with dexamethasone         Location: [] Take home patch   [] In clinic    []  Ice     []  heat  []  Ice massage  []  Laser   []  Anodyne Position:  Location:    []  Laser with stim  []  Other:  Position:  Location:    []  Vasopneumatic Device    []  Right     []  Left  Pre-treatment girth:  Post-treatment girth:  Measured at (location):  Pressure:       [] lo [] med [] hi   Temperature: [] lo [] med [] hi   [] Skin assessment post-treatment:  []intact []redness- no adverse reaction    []redness - adverse reaction:       18 min Therapeutic Exercise:  [x] See flow sheet :   Rationale: increase ROM and increase strength to improve the patients ability to perform ADL      10 min Manual Therapy:  Gentle grade I distraction with PROM flexion and scaption, ER in scapular plane. The manual therapy interventions were performed at a separate and distinct time from the therapeutic activities interventions. Rationale: decrease pain, increase ROM, and increase tissue extensibility to perform ADL    With   [] TE   [] TA   [] neuro   [] other: Patient Education: [x] Review HEP    [] Progressed/Changed HEP based on:   [] positioning   [] body mechanics   [] transfers   [] heat/ice application    [] other:      Other Objective/Functional Measures: see recert     Pain Level (0-10 scale) post treatment: 6    ASSESSMENT/Changes in Function:  The pt continues to progress gradually with PROM phase and does report some improvement in pain. Patient will continue to benefit from skilled PT services to modify and progress therapeutic interventions, address functional mobility deficits, address ROM deficits, address strength deficits, analyze and address soft tissue restrictions, and analyze and cue movement patterns to attain remaining goals. []  See Plan of Care  [x]  See progress note/recertification  []  See Discharge Summary         Progress towards goals / Updated goals:  Short Term Goals: To be accomplished in 1 weeks:              1. The pt will be I and compliant with HEP              IE- issued HEP              Current: reports compliance 8-26-22  Long Term Goals:  To be accomplished in 4 weeks:              1. Improve left shoulder PROM flexion to 120 degree for improved ability for future UE use              IE- 60 degrees              Current: progressing 115 degrees on 9-21-22              2. Improve left shoulder PROM scaption to 120 degrees for improved ability for future UE use IE- 60 degrees              Current: progressing 112 degrees on 9-21-22              3. Improve left shoulder PROM ER to 40 degrees for improved ability for future UE use.               IE- neutral              Current: progressing 18 degrees on 9-21-22    PLAN  []  Upgrade activities as tolerated     [x]  Continue plan of care  []  Update interventions per flow sheet       []  Discharge due to:_  []  Other:_      Roslyn Coates, PT 9/21/2022  10:07 AM    Future Appointments   Date Time Provider Deidre Busby   9/22/2022  9:15 AM Tl Nevarez, PT [facility]   9/27/2022  9:15 [facility]   9/28/2022  2:45 PM Jozef Marie PA [facility]   9/29/2022  9:15 AM Gideon Hollis, PTA [facility]

## 2022-09-21 NOTE — PROGRESS NOTES
[facility]  [address] [facility] [address]  [place], [address]  [phone] fax    Continued Plan of Care/ Re-certification for Physical Therapy Services    Patient name: Rae Palmer Start of Care: 2022   Referral source: Cali Hancock, [address] : 1960                Medical Diagnosis: Pain in left shoulder [M25.512]  Payor: VA MEDICARE / Plan: VA MEDICARE PART A & B / Product Type: Medicare /  Onset Date:22                Treatment Diagnosis: left shoulder pain   Prior Hospitalization: see medical history Provider#: [id number]   Medications: Verified on Patient summary List    Comorbidities: Hearing impairment, depression, OA, HTN, asthma, right shoulder RCR   Prior Level of Function: functionally I with daily tasks. Visits from Start of Care: 10    Missed Visits: 1    The Plan of Care and following information is based on the patient's current status:  Progress towards goals:  Short Term Goals: To be accomplished in 1 weeks:              1. The pt will be I and compliant with HEP              IE- issued HEP              Current: reports compliance 22  Long Term Goals: To be accomplished in 4 weeks:              1. Improve left shoulder PROM flexion to 120 degree for improved ability for future UE use              IE- 60 degrees              Current: progressing 115 degrees on 22              2. Improve left shoulder PROM scaption to 120 degrees for improved ability for future UE use              IE- 60 degrees              Current: progressing 112 degrees on 22              3. Improve left shoulder PROM ER to 40 degrees for improved ability for future UE use. IE- neutral              Current: progressing 18 degrees on 22    Key functional changes: The pt is progressing well with PROM phase as noted above.  Pain remains at higher levels but has diminished upon today's arrival.       Problems/ barriers to goal attainment: none     Problem List: pain affecting function, decrease ROM, decrease strength, decrease ADL/ functional abilitiies, decrease activity tolerance, and decrease flexibility/ joint mobility    Treatment Plan: Therapeutic exercise, Therapeutic activities, Neuromuscular re-education, Physical agent/modality, Manual therapy, Patient education, and Self Care training     Patient Goal (s) has been updated and includes: \"For the pain to go down\"     Goals for this certification period to be accomplished in 4 weeks:     1. Improve left shoulder PROM flexion to 120 degree for improved ability for future UE use              PN: 115 degrees              2. Improve left shoulder PROM scaption to 120 degrees for improved ability for future UE use              PN: 112 degrees              3. Improve left shoulder PROM ER to 40 degrees for improved ability for future UE use. PN- 18 degrees            4. The pt will progress to AAROM phase once cleared by ortho to progress towards functional use of UE. PN: PROM phase    Frequency / Duration: Patient to be seen 2-3 times per week for 4 weeks:    Assessment / Recommendations: The pt will benefit from continuation of PT to further her current progress. Pain management has been a challenge for the pt but PROM is progressing well and without pain increase. Certification Period: 09-21-22 to 10-19-22    Stevo Fowler PT 9/21/2022 10:34 AM    ________________________________________________________________________  I certify that the above Therapy Services are being furnished while the patient is under my care. I agree with the treatment plan and certify that this therapy is necessary. [] I have read the above and request that my patient continue as recommended.   [] I have read the above report and request that my patient continue therapy with the following changes/special instructions: _____________________________________________  [] I have read the above report and request that my patient be discharged from therapy    Physician's Signature:____________Date:_________TIME:________     ABHISHEK Tucker  ** Signature, Date and Time must be completed for valid certification **    Please sign and return to [facility] [address] [place] [address] & [address]  [address]  [place], [address] [place] [address] Str.  [phone] fax

## 2022-09-22 ENCOUNTER — HOSPITAL ENCOUNTER (OUTPATIENT)
Dept: PHYSICAL THERAPY | Age: 62
Discharge: HOME | End: 2022-09-22
Payer: MEDICARE

## 2022-09-22 ENCOUNTER — TELEPHONE (OUTPATIENT)
Dept: PHYSICAL THERAPY | Age: 62
End: 2022-09-22

## 2022-09-22 PROCEDURE — 97112 NEUROMUSCULAR REEDUCATION: CPT

## 2022-09-22 PROCEDURE — 97110 THERAPEUTIC EXERCISES: CPT

## 2022-09-22 NOTE — PROGRESS NOTES
PT DAILY TREATMENT NOTE     Patient Name: Spenser Lenz  Date:2022  : 1960  [x]  Patient  Verified  Payor: VA MEDICARE / Plan: VA MEDICARE PART A & B / Product Type: Medicare /    In time:1:05  Out time:1:43  Total Treatment Time (min): 38  Visit #: 2 of     Medicare/BCBS Only   Total Timed Codes (min):  28 1:1 Treatment Time:  28       Treatment Area: Pain in left shoulder [M25.512]    SUBJECTIVE  Pain Level (0-10 scale): 8  Any medication changes, allergies to medications, adverse drug reactions, diagnosis change, or new procedure performed?: [x] No    [] Yes (see summary sheet for update)  Subjective functional status/changes:   [] No changes reported  Pt reports 8/10 pain in the left shoulder today. OBJECTIVE    Modality rationale: decrease inflammation and decrease pain to improve the patients ability to perform ADL's with ease.    Min Type Additional Details   10 [x] Estim:  [x]Unatt       []IFC  [x]Premod                        []Other:  [x]w/ice   []w/heat  Position:seated  Location:    [] Estim: []Att    []TENS instruct  []NMES                    []Other:  []w/US   []w/ice   []w/heat  Position:  Location:    []  Traction: [] Cervical       []Lumbar                       [] Prone          []Supine                       []Intermittent   []Continuous Lbs:  [] before manual  [] after manual    []  Ultrasound: []Continuous   [] Pulsed                           []1MHz   []3MHz W/cm2:  Location:    []  Iontophoresis with dexamethasone         Location: [] Take home patch   [] In clinic    []  Ice     []  heat  []  Ice massage  []  Laser   []  Anodyne Position:  Location:    []  Laser with stim  []  Other:  Position:  Location:    []  Vasopneumatic Device    []  Right     []  Left  Pre-treatment girth:  Post-treatment girth:  Measured at (location):  Pressure:       [] lo [] med [] hi   Temperature: [] lo [] med [] hi   [x] Skin assessment post-treatment:  [x]intact []redness- no adverse reaction    []redness - adverse reaction:     18 min Therapeutic Exercise:  [x] See flow sheet :   Rationale: increase ROM, increase strength, and improve coordination to improve the patients ability to perform functional task with ease. 10 min Manual Therapy:   Gentle grade I distraction with PROM flexion and scaption, ER in scapular plane. The manual therapy interventions were performed at a separate and distinct time from the therapeutic activities interventions. Rationale: decrease pain, increase ROM, and increase tissue extensibility to improve functional mobility with over head reaching ADL's per protocol. With   [] TE   [] TA   [] neuro   [] other: Patient Education: [x] Review HEP    [] Progressed/Changed HEP based on:   [] positioning   [] body mechanics   [] transfers   [] heat/ice application    [] other:      Other Objective/Functional Measures:      Pain Level (0-10 scale) post treatment: 6    ASSESSMENT/Changes in Function:   Pt displays good PROM of the left shoulder but displays a poor tolerance to manual due to the elevated pain that remains in the right shoulder. The pt is 5 weeks out from her surgery and will be progressing towards AAROM of the left shoulder per tolerance. Lateral shoulder pain noted at the left shoulder, felt the most with eccentric movements. The pt reports good relief with cold modality and decreased pain post treatment. Patient will continue to benefit from skilled PT services to modify and progress therapeutic interventions, address functional mobility deficits, address ROM deficits, address strength deficits, analyze and address soft tissue restrictions, analyze and cue movement patterns, analyze and modify body mechanics/ergonomics, and assess and modify postural abnormalities to attain remaining goals.      [x]  See Plan of Care  []  See progress note/recertification  []  See Discharge Summary         Progress towards goals / Updated goals:  Goals for this certification period to be accomplished in 4 weeks:                 1. Improve left shoulder PROM flexion to 120 degree for improved ability for future UE use              PN: 115 degrees              2. Improve left shoulder PROM scaption to 120 degrees for improved ability for future UE use              PN: 112 degrees              3. Improve left shoulder PROM ER to 40 degrees for improved ability for future UE use. PN- 18 degrees                        4. The pt will progress to AAROM phase once cleared by ortho to progress towards functional use of UE.                 PN: PROM phase    PLAN  []  Upgrade activities as tolerated     [x]  Continue plan of care  []  Update interventions per flow sheet       []  Discharge due to:_  []  Other:_      Merlyn Mercado, PTA 9/22/2022  1:03 PM    Future Appointments   Date Time Provider Deidre Busby   9/27/2022  9:15 AM [facility]   9/28/2022  2:45 PM Napoleon Berger PA [facility]   9/29/2022  9:15 AM [facility] Durham [facility]

## 2022-09-27 ENCOUNTER — HOSPITAL ENCOUNTER (OUTPATIENT)
Dept: PHYSICAL THERAPY | Age: 62
Discharge: HOME | End: 2022-09-27
Payer: MEDICARE

## 2022-09-27 PROCEDURE — 97140 MANUAL THERAPY 1/> REGIONS: CPT

## 2022-09-27 PROCEDURE — 97014 ELECTRIC STIMULATION THERAPY: CPT

## 2022-09-27 PROCEDURE — 97110 THERAPEUTIC EXERCISES: CPT

## 2022-09-27 NOTE — PROGRESS NOTES
PT DAILY TREATMENT NOTE     Patient Name: Rosendo Espinal  Date:2022  : 1960  [x]  Patient  Verified  [place]or: [place] MED[place] / Plan: [place] MED[place]ARE PART A & B / Product Type: Medicare /    In ABTD:4938  Out time:1009  Total Treatment Time (min): 42  Visit #:  3 of     Medicare/BCBS Only   Total Timed Codes (min):  32 1:1 Treatment Time:  32       Treatment Area: Pain in left shoulder [M25.512]    SUBJECTIVE  Pain Level (0-10 scale): 7  Any medication changes, allergies to medications, adverse drug reactions, diagnosis change, or new procedure performed?: [x] No    [] Yes (see summary sheet for update)  Subjective functional status/changes:   [x] No changes reported       OBJECTIVE    Modality rationale: decrease inflammation and decrease pain to improve the patients ability to perform ADL   Min Type Additional Details   10 [x] Estim:  []Unatt       [x]IFC  []Premod                        []Other:  [x]w/ice   []w/heat  Position: seated  Location:left shoulder    [] Estim: []Att    []TENS instruct  []NMES                    []Other:  []w/US   []w/ice   []w/heat  Position:  Location:    []  Traction: [] Cervical       []Lumbar                       [] Prone          []Supine                       []Intermittent   []Continuous Lbs:  [] before manual  [] after manual    []  Ultrasound: []Continuous   [] Pulsed                           []1MHz   []3MHz W/cm2:  Location:    []  Iontophoresis with dexamethasone         Location: [] Take home patch   [] In clinic    []  Ice     []  heat  []  Ice massage  []  Laser   []  Anodyne Position:  Location:    []  Laser with stim  []  Other:  Position:  Location:    []  Vasopneumatic Device    []  Right     []  Left  Pre-treatment girth:  Post-treatment girth:  Measured at (location):  Pressure:       [] lo [] med [] hi   Temperature: [] lo [] med [] hi   [] Skin assessment post-treatment:  []intact []redness- no adverse reaction    []redness - adverse reaction:   22 min Therapeutic Exercise:  [x] See flow sheet :   Rationale: increase ROM to improve the patients ability to perform ADL      10 min Manual Therapy:  Gentle STJ clocks and PROM of the left shoulder flexion, scaption, ER with pt supine   The manual therapy interventions were performed at a separate and distinct time from the therapeutic activities interventions. Rationale: decrease pain and increase ROM to improve functional use    With   [] TE   [] TA   [] neuro   [] other: Patient Education: [x] Review HEP    [] Progressed/Changed HEP based on:   [] positioning   [] body mechanics   [] transfers   [] heat/ice application    [] other:      Other Objective/Functional Measures: no changes to theres     Pain Level (0-10 scale) post treatment: 7    ASSESSMENT/Changes in Function: The pt continues to make gradual progress with PROM phase. Increased PROM ER noted with today's session. Pain level remains high at all times. The pt has MD follow-up tomorrow. Patient will continue to benefit from skilled PT services to modify and progress therapeutic interventions, address functional mobility deficits, address ROM deficits, address strength deficits, and analyze and address soft tissue restrictions to attain remaining goals. []  See Plan of Care  []  See progress note/recertification  []  See Discharge Summary         Progress towards goals / Updated goals:  Goals for this certification period to be accomplished in 4 weeks:                 1. Improve left shoulder PROM flexion to 120 degree for improved ability for future UE use              PN: 115 degrees              2. Improve left shoulder PROM scaption to 120 degrees for improved ability for future UE use              PN: 112 degrees              3. Improve left shoulder PROM ER to 40 degrees for improved ability for future UE use.              PN- 18 degrees   Current: progressing 25 degrees on 9-27-22                        4. The pt will progress to AAROM phase once cleared by ortho to progress towards functional use of UE.                 PN: PROM phase    PLAN  []  Upgrade activities as tolerated     [x]  Continue plan of care  []  Update interventions per flow sheet       []  Discharge due to:_  []  Other:_      Veto Avila, PT 9/27/2022  9:36 AM    Future Appointments   Date Time Provider Deidre Busby   9/28/2022  2:45 PM Remington Sorenson [facility]   9/29/2022  9:15 [facility]   10/4/2022  8:30 AM [facility]   10/6/2022 10:45 AM [facility]

## 2022-09-28 ENCOUNTER — TELEPHONE (OUTPATIENT)
Dept: PHYSICAL THERAPY | Age: 62
End: 2022-09-28

## 2022-09-28 ENCOUNTER — OFFICE VISIT (OUTPATIENT)
Dept: ORTHOPEDIC SURGERY | Age: 62
End: 2022-09-28
Payer: MEDICARE

## 2022-09-28 DIAGNOSIS — M75.102 NONTRAUMATIC TEAR OF LEFT ROTATOR CUFF, UNSPECIFIED TEAR EXTENT: Primary | ICD-10-CM

## 2022-09-28 PROCEDURE — 99024 POSTOP FOLLOW-UP VISIT: CPT | Performed by: PHYSICIAN ASSISTANT

## 2022-09-28 NOTE — PROGRESS NOTES
Kiesha ARMIDA Espinal  1960     HISTORY OF PRESENT ILLNESS  Baldemar Espinal is a 58 y.o. female who presents today for evaluation s/p Left shoulder arthroscopic 3cm rotator cuff repair on 8/19/22. Patient has been going to PT feels slight improvement but is still in a lot of discomfort. Muscle spasms are quite severe. Describes pain as a 8/10. Has been taking roxicodone for pain. Still has night pain. Patient denies any fever, chills, chest pain, shortness of breath or calf pain. The remainder of the review of systems is negative. There are no new illness or injuries to report since last seen in the office. No changes in medications, allergies, social or family history. PHYSICAL EXAM:   There were no vitals taken for this visit. The patient is a well-developed, well-nourished female in no acute distress. The patient is alert and oriented times three. The patient appears to be well groomed. Mood and affect are normal.  ORTHOPEDIC EXAM of left shoulder:  Inspection: swelling present,  Bruising present, patient very guarded today  Incisions well healed  Passive glenohumeral abduction 0-90 degrees  Stability: Stable  Strength: n/a  2+ distal pulses    IMPRESSION:  S/P Left shoulder arthroscopic 3cm rotator cuff repair    PLAN:   Patient with persistent pain post operatively despite therapy. Will order MRI arthrogram r/o retear  3. Will hold on therapy until after MRI.  Recommended continuing with her PROM on her own  RTC after MRI with Dr. Susy Barbosa, [facility]

## 2022-09-29 ENCOUNTER — APPOINTMENT (OUTPATIENT)
Dept: PHYSICAL THERAPY | Age: 62
End: 2022-09-29
Payer: MEDICARE

## 2022-10-04 ENCOUNTER — APPOINTMENT (OUTPATIENT)
Dept: PHYSICAL THERAPY | Age: 62
End: 2022-10-04
Payer: MEDICARE

## 2022-10-06 ENCOUNTER — APPOINTMENT (OUTPATIENT)
Dept: PHYSICAL THERAPY | Age: 62
End: 2022-10-06
Payer: MEDICARE

## 2022-10-09 RX ORDER — BACLOFEN 10 MG/1
TABLET ORAL
Qty: 90 TABLET | Refills: 1 | Status: SHIPPED | OUTPATIENT
Start: 2022-10-09 | End: 2022-10-20 | Stop reason: ALTCHOICE

## 2022-10-19 ENCOUNTER — TELEPHONE (OUTPATIENT)
Dept: ORTHOPEDIC SURGERY | Age: 62
End: 2022-10-19

## 2022-10-19 RX ORDER — PREDNISONE 50 MG/1
TABLET ORAL
Qty: 3 TABLET | Refills: 0 | Status: SHIPPED | OUTPATIENT
Start: 2022-10-19

## 2022-10-19 NOTE — TELEPHONE ENCOUNTER
Please inform patient that prior to her upcoming MRI arthrogram the following needs to be completed. Prednisone has been sent to her pharmacy. The other meds are OTC. Please have her get them    Please inform patient of following:  RX for Three doses of 50 mg oral prednisone administered 13, 7, and 1 hour prior to contrast administration sent to pharmacy, plus have patient take OTC 50 mg oral Benedryl (2tabs) administered 7 hours and 1 hour prior to contrast administration.  Zantac 150mg at 7 hours and 1 hour prior to study

## 2022-10-20 RX ORDER — METAXALONE 800 MG/1
800 TABLET ORAL 3 TIMES DAILY
Qty: 90 TABLET | Refills: 2 | Status: SHIPPED | OUTPATIENT
Start: 2022-10-20

## 2022-10-24 ENCOUNTER — HOSPITAL ENCOUNTER (OUTPATIENT)
Dept: MRI IMAGING | Age: 62
Discharge: HOME | End: 2022-10-24
Attending: PHYSICIAN ASSISTANT
Payer: MEDICARE

## 2022-10-24 ENCOUNTER — HOSPITAL ENCOUNTER (OUTPATIENT)
Dept: GENERAL RADIOLOGY | Age: 62
Discharge: HOME | End: 2022-10-24
Attending: PHYSICIAN ASSISTANT
Payer: MEDICARE

## 2022-10-24 DIAGNOSIS — M75.102 NONTRAUMATIC TEAR OF LEFT ROTATOR CUFF, UNSPECIFIED TEAR EXTENT: ICD-10-CM

## 2022-10-24 PROCEDURE — 73222 MRI JOINT UPR EXTREM W/DYE: CPT

## 2022-10-24 PROCEDURE — 74011000250 HC RX REV CODE- 250: Performed by: PHYSICIAN ASSISTANT

## 2022-10-24 PROCEDURE — 77002 NEEDLE LOCALIZATION BY XRAY: CPT

## 2022-10-24 PROCEDURE — A9576 INJ PROHANCE MULTIPACK: HCPCS | Performed by: PHYSICIAN ASSISTANT

## 2022-10-24 PROCEDURE — 74011000636 HC RX REV CODE- 636: Performed by: PHYSICIAN ASSISTANT

## 2022-10-24 PROCEDURE — 74011250636 HC RX REV CODE- 250/636: Performed by: PHYSICIAN ASSISTANT

## 2022-10-24 RX ORDER — LIDOCAINE HYDROCHLORIDE 10 MG/ML
5 INJECTION, SOLUTION EPIDURAL; INFILTRATION; INTRACAUDAL; PERINEURAL
Status: COMPLETED | OUTPATIENT
Start: 2022-10-24 | End: 2022-10-24

## 2022-10-24 RX ORDER — SODIUM CHLORIDE 9 MG/ML
10 INJECTION INTRAMUSCULAR; INTRAVENOUS; SUBCUTANEOUS
Status: COMPLETED | OUTPATIENT
Start: 2022-10-24 | End: 2022-10-24

## 2022-10-24 RX ADMIN — IOPAMIDOL 4 ML: 408 INJECTION, SOLUTION INTRATHECAL at 10:07

## 2022-10-24 RX ADMIN — SODIUM CHLORIDE 10 ML: 9 INJECTION, SOLUTION INTRAMUSCULAR; INTRAVENOUS; SUBCUTANEOUS at 10:07

## 2022-10-24 RX ADMIN — LIDOCAINE HYDROCHLORIDE 5 ML: 10 INJECTION, SOLUTION EPIDURAL; INFILTRATION; INTRACAUDAL; PERINEURAL at 10:07

## 2022-10-24 RX ADMIN — GADOTERIDOL 0.15 ML: 279.3 INJECTION, SOLUTION INTRAVENOUS at 10:07

## 2022-10-24 NOTE — PROCEDURES
RADIOLOGY POST PROCEDURE NOTE     October 24, 2022       10:19 AM     Preoperative Diagnosis:   Left shoulder pain. Postoperative Diagnosis:  Same. :  ABHISHEK Verdin    Assistant:  None. Type of Anesthesia: 1% plain lidocaine    Procedure/Description:  Image guided left shoulder arthrogram prior to MRI    Findings:   Successful guided left shoulder arthrogram prior to MRI. Estimated blood Loss:  Minimal    Specimen Removed:   no    Blood transfusions:  None. Implants:  None.     Complications: None    Condition: Stable    Blood thinning medications: OK to resume in 24 hours unless otherwise indicated    Discharge Plan:  continue present therapy    Gulshan Espinoza PA-C

## 2022-10-25 ENCOUNTER — OFFICE VISIT (OUTPATIENT)
Dept: ORTHOPEDIC SURGERY | Age: 62
End: 2022-10-25
Payer: MEDICARE

## 2022-10-25 VITALS — HEIGHT: 65 IN | BODY MASS INDEX: 33.32 KG/M2 | WEIGHT: 200 LBS

## 2022-10-25 DIAGNOSIS — M75.102 NONTRAUMATIC TEAR OF LEFT ROTATOR CUFF, UNSPECIFIED TEAR EXTENT: Primary | ICD-10-CM

## 2022-10-25 PROCEDURE — 3017F COLORECTAL CA SCREEN DOC REV: CPT | Performed by: ORTHOPAEDIC SURGERY

## 2022-10-25 PROCEDURE — G9717 DOC PT DX DEP/BP F/U NT REQ: HCPCS | Performed by: ORTHOPAEDIC SURGERY

## 2022-10-25 PROCEDURE — G8427 DOCREV CUR MEDS BY ELIG CLIN: HCPCS | Performed by: ORTHOPAEDIC SURGERY

## 2022-10-25 PROCEDURE — G8417 CALC BMI ABV UP PARAM F/U: HCPCS | Performed by: ORTHOPAEDIC SURGERY

## 2022-10-25 PROCEDURE — 99214 OFFICE O/P EST MOD 30 MIN: CPT | Performed by: ORTHOPAEDIC SURGERY

## 2022-10-25 PROCEDURE — G8756 NO BP MEASURE DOC: HCPCS | Performed by: ORTHOPAEDIC SURGERY

## 2022-10-25 NOTE — PROGRESS NOTES
Kiesha ARMIDA Espinal  1960     HISTORY OF PRESENT ILLNESS  Estephanie Espinal is a 58 y.o. female who presents today for reevaluation s/p Left shoulder arthroscopic 3cm rotator cuff repair on 8/19/22. She presents today for MRI review. Patient has been holding on PT. She is still in a lot of discomfort. Muscle spasms are quite severe. Describes pain as a 10/10. Has been taking roxicodone for pain. Still has night pain. She presents today wearing a sling. Patient denies any fever, chills, chest pain, shortness of breath or calf pain. The remainder of the review of systems is negative. There are no new illness or injuries to report since last seen in the office. No changes in medications, allergies, social or family history. PHYSICAL EXAM:   Visit Vitals  Ht 5' 4.5\" (1.638 m)   Wt 200 lb (90.7 kg)   BMI 33.80 kg/m²        The patient is a well-developed, well-nourished female in no acute distress. The patient is alert and oriented times three. The patient appears to be well groomed. Mood and affect are normal.  ORTHOPEDIC EXAM of left shoulder:  Inspection: swelling present,  Bruising present, patient very guarded today  Incisions well healed  Passive glenohumeral abduction 0-90 degrees  Stability: Stable  Strength: n/a  2+ distal pulses    IMAGING: MRI arthrogram of left shoulder dated 10/24/2022 was reviewed and read by Dr. Cote Delay:   IMPRESSION:  Postsurgical changes of rotator cuff repair and acromioplasty. Undersurface  fraying with undersurface small tear and intrasubstance delamination at the  posterior supraspinatus tendon. Otherwise, moderate rotator cuff tendinosis. No gross full-thickness tear. There is minimal contrast in the subacromial  subdeltoid bursa likely due to full-thickness perforations. One of the rotator cuff anchor screws extends 5 mm within the groove and likely impinging on the adjacent biceps tendon, see reference images.  Biceps tendinosis with likely extra-articular interstitial tear. Distal clavicle hypertrophy with mild mass effect on the supraspinatus tendon. Likely subacromial subdeltoid bursitis. IMPRESSION:  S/P Left shoulder arthroscopic 3cm rotator cuff repair  Encounter Diagnosis   Name Primary? Nontraumatic tear of left rotator cuff, unspecified tear extent Yes         PLAN:   Patient with persistent pain post operatively despite therapy. MRI reveals backing out of one of the rotator cuff anchor screws. We discussed surgery in the office today and the patient would like to proceed. I discussed the risks and benefits and potential adverse outcomes of both operative vs non operative treatment of  s/p Left shoulder arthroscopic 3cm rotator cuff repair with the patient and patient wishes to proceed with left shoulder hardware removal with possible biceps tenodesis. Risks of operative intervention include but not limited to bleeding, infection, deep vein thrombosis, pulmonary embolism, death, limb length discrepancy, reflexive sympathetic dystrophy, fat embolism syndrome,damage to blood vessels and nerves, malunion, non-union, delayed union, failure of hardware, post traumatic arthritis, stroke, heart attack, and death. Patient understands that infection may arise and may require numerous surgeries. The patient was counseled at length about the risks of tish Covid-19 during their perioperative period and any recovery window from their procedure. The patient was made aware that tish Covid-19  may worsen their prognosis for recovering from their procedure and lend to a higher morbidity and/or mortality risk. All material risks, benefits, and reasonable alternatives including postponing the procedure were discussed. The patient does  wish to proceed with the procedure at this time. History and physical exam to be preformed at a later date.       RTC H&P    Scribed by Sigrid Quintin [address]) as dictated by Jayden Hermosillo Lennox Ruder, MD    I, Dr. Mara Andersen, confirm that all documentation is accurate.     Mara Andersen M.D.   [facility]

## 2022-10-26 DIAGNOSIS — Z01.818 PREOP EXAMINATION: Primary | ICD-10-CM

## 2022-10-26 DIAGNOSIS — M75.102 NONTRAUMATIC TEAR OF LEFT ROTATOR CUFF, UNSPECIFIED TEAR EXTENT: ICD-10-CM

## 2022-10-31 ENCOUNTER — HOSPITAL ENCOUNTER (OUTPATIENT)
Dept: LAB | Age: 62
Discharge: HOME | End: 2022-10-31
Payer: MEDICARE

## 2022-10-31 DIAGNOSIS — Z01.818 PREOP EXAMINATION: ICD-10-CM

## 2022-10-31 LAB
BASOPHILS # BLD: 0.1 K/UL (ref 0–0.1)
BASOPHILS NFR BLD: 1 % (ref 0–2)
DIFFERENTIAL METHOD BLD: ABNORMAL
EOSINOPHIL # BLD: 0.3 K/UL (ref 0–0.4)
EOSINOPHIL NFR BLD: 5 % (ref 0–5)
ERYTHROCYTE [DISTWIDTH] IN BLOOD BY AUTOMATED COUNT: 14.4 % (ref 11.6–14.5)
HCT VFR BLD AUTO: 33.9 % (ref 35–45)
HGB BLD-MCNC: 10.6 G/DL (ref 12–16)
IMM GRANULOCYTES # BLD AUTO: 0 K/UL (ref 0–0.04)
IMM GRANULOCYTES NFR BLD AUTO: 0 % (ref 0–0.5)
LYMPHOCYTES # BLD: 2.1 K/UL (ref 0.9–3.6)
LYMPHOCYTES NFR BLD: 34 % (ref 21–52)
MCH RBC QN AUTO: 29.8 PG (ref 24–34)
MCHC RBC AUTO-ENTMCNC: 31.3 G/DL (ref 31–37)
MCV RBC AUTO: 95.2 FL (ref 78–100)
MONOCYTES # BLD: 0.6 K/UL (ref 0.05–1.2)
MONOCYTES NFR BLD: 9 % (ref 3–10)
NEUTS SEG # BLD: 3.3 K/UL (ref 1.8–8)
NEUTS SEG NFR BLD: 52 % (ref 40–73)
NRBC # BLD: 0 K/UL (ref 0–0.01)
NRBC BLD-RTO: 0 PER 100 WBC
PLATELET # BLD AUTO: 253 K/UL (ref 135–420)
PMV BLD AUTO: 13 FL (ref 9.2–11.8)
RBC # BLD AUTO: 3.56 M/UL (ref 4.2–5.3)
WBC # BLD AUTO: 6.3 K/UL (ref 4.6–13.2)

## 2022-10-31 PROCEDURE — 85025 COMPLETE CBC W/AUTO DIFF WBC: CPT

## 2022-10-31 PROCEDURE — 36415 COLL VENOUS BLD VENIPUNCTURE: CPT

## 2022-10-31 PROCEDURE — 93005 ELECTROCARDIOGRAM TRACING: CPT

## 2022-11-01 LAB
ATRIAL RATE: 71 BPM
CALCULATED P AXIS, ECG09: 63 DEGREES
CALCULATED R AXIS, ECG10: 13 DEGREES
CALCULATED T AXIS, ECG11: 27 DEGREES
DIAGNOSIS, 93000: NORMAL
P-R INTERVAL, ECG05: 114 MS
Q-T INTERVAL, ECG07: 416 MS
QRS DURATION, ECG06: 74 MS
QTC CALCULATION (BEZET), ECG08: 452 MS
VENTRICULAR RATE, ECG03: 71 BPM

## 2022-11-07 RX ORDER — BACLOFEN 10 MG/1
TABLET ORAL
Qty: 90 TABLET | Refills: 1 | Status: SHIPPED | OUTPATIENT
Start: 2022-11-07 | End: 2022-12-01

## 2022-11-08 ENCOUNTER — OFFICE VISIT (OUTPATIENT)
Dept: ORTHOPEDIC SURGERY | Age: 62
End: 2022-11-08

## 2022-11-08 VITALS
WEIGHT: 201 LBS | DIASTOLIC BLOOD PRESSURE: 86 MMHG | SYSTOLIC BLOOD PRESSURE: 150 MMHG | RESPIRATION RATE: 16 BRPM | HEIGHT: 65 IN | TEMPERATURE: 97.8 F | BODY MASS INDEX: 33.49 KG/M2

## 2022-11-08 DIAGNOSIS — M67.922 BICEPS TENDINOPATHY, LEFT: ICD-10-CM

## 2022-11-08 DIAGNOSIS — M75.102 NONTRAUMATIC TEAR OF LEFT ROTATOR CUFF, UNSPECIFIED TEAR EXTENT: Primary | ICD-10-CM

## 2022-11-08 RX ORDER — GABAPENTIN 300 MG/1
300 CAPSULE ORAL
Qty: 14 CAPSULE | Refills: 0 | Status: SHIPPED | OUTPATIENT
Start: 2022-11-08 | End: 2022-11-22

## 2022-11-08 RX ORDER — OXYCODONE HYDROCHLORIDE 5 MG/1
5 TABLET ORAL
Qty: 40 TABLET | Refills: 0 | Status: SHIPPED | OUTPATIENT
Start: 2022-11-08 | End: 2022-11-09

## 2022-11-08 NOTE — PATIENT INSTRUCTIONS
Dr. Anisa Clemons Shoulder Arthroscopy Information    What is the surgery? This is an outpatient procedure at either [facility] or [address] will be completely asleep for the procedure. Dr. Anisa Clemons will make somewhere between 2-5 small incisions in your shoulder depending on the amount of work to be completed. He will take a tour of your shoulder with the camera and fix everything that needs to be fixed during your surgery. Total surgery takes about 45 mins to an hour and half depending on how much needed to be repaired    What can you expect after surgery? You will have a bulky dressing on your shoulder that you can remove 2 days after surgery. You will be able to shower 2 days after surgery but no soaking in a bath, hot tub, ocean or pool x 2 weeks to allow for full wound healing  You will be in a sling for 5-6 weeks depending on your repair. You will wear this sling whenever you are active, up moving around, and sleeping at night. This sling is to keep you from moving your arm on your own. You are essentially one armed until you are out of your sling. This means no reaching, pulling, grabbing or lifting with the operative arm. Dr. Anisa Clemons will start physical therapy for you the first business day after surgery. While you cannot move your arm we allow physical therapy to gently move your shoulder. We call this passive range of motion. The goal of this is to decrease your stiffness and in turn decrease your post operative pain. Plan on being in physical therapy for 10-12 weeks    When can I return to work? Most patients return to desk work only after 2 weeks. You are able to type but there is no overhead work or lifting  You will start some gentle lifting up to 5-10lbs at about 8-10 weeks post operatively. You will gradually increase how much you are able to lift after this point under the guidance of Dr. Anisa Clemons, his physician assistant and physical therapy.   At 6 months you are able to do all activities as tolerated but it may take you a full 9-12 months to fully recover from your surgery    Not all shoulder arthroscopies are the same. The specifics of your individual case will be discussed at length with you by Dr. Edmar Mariano and his Physician Assistant. Lenny Parry  Surgical Coordinator  [address] [place] [address]. Rogelio. [address], [facility] [email or web address]  P: [phone]  F: [phone]      Exercises to be done post operatively before your first session of therapy: It is ok to remove your sling to perform these exercises. Pendulum swing    If you have pain in your back, do not do this exercise. Hold on to a table or the back of a chair with your good arm. Then bend forward a little and let your sore arm hang straight down. This exercise does not use the arm muscles. Rather, use your legs and your hips to create movement that makes your arm swing freely. Use the movement from your hips and legs to guide the slightly swinging arm back and forth like a pendulum (or elephant trunk). Then guide it in circles that start small (about the size of a dinner plate). Make the circles a bit larger each day, as your pain allows. Do this exercise for up to 5 minutes, 5 to 7 times each day. As you have less pain, try bending over a little farther to do this exercise. This will increase the amount of movement at your shoulder. Elbow flexion stretch    While letting your arm hang down at your side, Lift the forearm of the operative arm and bend the elbow. Then extend or straighten your arm again. Your palm should face toward you.    You can do this assisted with your good arm or on your own  Repeat 2 to 4 times a day
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
## 2022-11-09 ENCOUNTER — TELEPHONE (OUTPATIENT)
Dept: ORTHOPEDIC SURGERY | Age: 62
End: 2022-11-09

## 2022-11-09 DIAGNOSIS — M75.102 NONTRAUMATIC TEAR OF LEFT ROTATOR CUFF, UNSPECIFIED TEAR EXTENT: Primary | ICD-10-CM

## 2022-11-09 RX ORDER — OXYCODONE HYDROCHLORIDE 10 MG/1
10 TABLET ORAL
Qty: 40 TABLET | Refills: 0 | Status: SHIPPED | OUTPATIENT
Start: 2022-11-09 | End: 2022-11-16

## 2022-11-09 NOTE — TELEPHONE ENCOUNTER
Patient called for ABHISHEK Torres. Patient said she is having sx done on 11/14/22 for her left shoulder    Patient said her pharmacy will not fill the Oxycodone 5 mg medication. Patient is asking if ABHISHEK Torres could prescribe her some Oxycodone 10 mg medication instead. That she takes the 10 mg medication anyway , and prefers the 10 mg Oxycodone. [facility] pharmacy on [address] [facility]. Tel. [phone]. Patient tel. [phone].

## 2022-11-10 NOTE — TELEPHONE ENCOUNTER
Patient called and stated that someone needs to call the pharmacy and advise that she needs this rx for her surgery.

## 2022-11-10 NOTE — TELEPHONE ENCOUNTER
Reached mailbox that has not been set up as of yet. If patient calls back, please make her aware her medication has been sent to her pharmacy.

## 2022-11-15 ENCOUNTER — HOSPITAL ENCOUNTER (OUTPATIENT)
Dept: PHYSICAL THERAPY | Age: 62
Discharge: HOME | End: 2022-11-15
Attending: PHYSICIAN ASSISTANT
Payer: MEDICARE

## 2022-11-15 DIAGNOSIS — M75.102 NONTRAUMATIC TEAR OF LEFT ROTATOR CUFF, UNSPECIFIED TEAR EXTENT: Primary | ICD-10-CM

## 2022-11-15 PROCEDURE — 97162 PT EVAL MOD COMPLEX 30 MIN: CPT

## 2022-11-15 PROCEDURE — 97140 MANUAL THERAPY 1/> REGIONS: CPT

## 2022-11-15 PROCEDURE — 97110 THERAPEUTIC EXERCISES: CPT

## 2022-11-15 NOTE — PROGRESS NOTES
[facility]  [address] [facility] [address]  Junaid treviño, [address]  [phone] fax    Plan of Care/ Statement of Necessity for Physical Therapy Services    Patient name: Elias Blackburn Start of Care: 11/15/2022   Referral source: Jacky Simmons Jamesonma : 1960    Medical Diagnosis: Nontraumatic tear of left rotator cuff, unspecified tear extent [M75.102]  [place]or: [place] MEDICARE / Plan: [place] MEDICARE PART A & B / Product Type: Medicare /  Onset Date:22    Treatment Diagnosis: Left shoulder s/p debridement   Prior Hospitalization: see medical history Provider#: [id number]   Medications: Verified on Patient summary List    Comorbidities: Left RTC repair /; nothing else reported   Prior Level of Function: LHD; limited with daily activities and ADLs prior to recent surgery      The Plan of Care and following information is based on the information from the initial evaluation. Assessment/ key information: 58y.o. year old female presents with CC of left shoulder pain s/p left shoulder debridement and biceps tenotomy. Patient underwent left RTC repair in 2022 and developed adhesions and required removal of one screw during recent surgery on 22. Impairments noted today: decreased left ST and GhJ mobility; limitations in left PROM /AROM in all directions; decreased left shoulder strength grossly. Patient will benefit from physical therapy to address deficits, and ultimately to return patient to prior level of function. Evaluation Complexity History MEDIUM  Complexity : 1-2 comorbidities / personal factors will impact the outcome/ POC ; Examination MEDIUM Complexity : 3 Standardized tests and measures addressing body structure, function, activity limitation and / or participation in recreation  ;Presentation MEDIUM Complexity : Evolving with changing characteristics  ; Clinical Decision Making MEDIUM Complexity : FOTO score of 26-74  Overall Complexity Rating: MEDIUM  Problem List: pain affecting function, decrease ROM, decrease strength, decrease ADL/ functional abilitiies, decrease activity tolerance, and decrease flexibility/ joint mobility   Treatment Plan may include any combination of the following: Therapeutic exercise, Neuromuscular reeducation, Manual therapy, Therapeutic activity, Self care/home management, and Vasopneumatic device  Patient / Family readiness to learn indicated by: asking questions, trying to perform skills, and interest  Persons(s) to be included in education: patient (P)  Barriers to Learning/Limitations: None  Patient Goal (s): to be pain and be able to lift my arm over my head  Patient Self Reported Health Status: good  Rehabilitation Potential: good    Short Term Goals: To be accomplished in 2 weeks:  1. I and compliant with HEP for self management of symptoms. 2. Increase left shoulder PROM flexion to 150 degrees to increase ease with ADLs. Long Term Goals: To be accomplished in 4 weeks:  1. Improve FOTO to 58 to indicate improved function with daily activities. 2. Increase left shoulder AROM to 150 degrees to increase ease with reaching shoulder height. 3. Increase left shoulder DAMI to C7 to increase ease with ADLs. 4. Increase left shoulder FIR to L1 to increase ease with donning/doffing undergarments. 5. Increase left shoulder strength to grossly 3+/5 to increase ease with daily activities. Frequency / Duration: Patient to be seen 2 times per week for 4 weeks. Patient/ Caregiver education and instruction: Diagnosis, prognosis, exercises   [x]  Plan of care has been reviewed with PTA    Certification Period: 11/15/22-12/14/22  Rosemary Pacheco, PT 11/15/2022 9:36 AM    ________________________________________________________________________    I certify that the above Therapy Services are being furnished while the patient is under my care.  I agree with the treatment plan and certify that this therapy is necessary.     [de-identified] Signature:____________________  Date:____________Time: _________     ABHISHEK García  Please sign and return to [facility] [address] [place] [address] & [address]  [address] [place] [address] [facility] [address]  [phone] fax

## 2022-11-15 NOTE — PROGRESS NOTES
PT DAILY TREATMENT NOTE 10-18    Patient Name: Nellie Unger  Date:11/15/2022  : 1960  [x]  Patient  Verified  Payor: VA MEDICARE / Plan: VA MEDICARE PART A & B / Product Type: Medicare /    In time:859  Out time:938  Total Treatment Time (min): 39  Visit #: 1 of 8    Medicare/BCBS Only   Total Timed Codes (min):  23 1:1 Treatment Time:  39       Treatment Area: Nontraumatic tear of left rotator cuff, unspecified tear extent [M75.102]    SUBJECTIVE  Pain Level (0-10 scale): 9  Any medication changes, allergies to medications, adverse drug reactions, diagnosis change, or new procedure performed?: [x] No    [] Yes (see summary sheet for update)  Subjective functional status/changes:   [] No changes reported  See eval    OBJECTIVE      16 min [x]Eval                  []Re-Eval       13 min Therapeutic Exercise:  [] See flow sheet :   Rationale: increase ROM to improve the patients ability to perform ADLs     10 min Manual Therapy:  grade 1 left ST mobs in right s/l; supine PROM with distraction in all directions   The manual therapy interventions were performed at a separate and distinct time from the therapeutic activities interventions. Rationale: decrease pain and increase ROM to perform ADLs  With   [] TE   [] TA   [] neuro   [] other: Patient Education: [x] Review HEP    [] Progressed/Changed HEP based on:   [] positioning   [] body mechanics   [] transfers   [] heat/ice application    [] other:      Other Objective/Functional Measures:      Pain Level (0-10 scale) post treatment: 9    ASSESSMENT/Changes in Function: see POC    Patient will continue to benefit from skilled PT services to modify and progress therapeutic interventions, address functional mobility deficits, address ROM deficits, address strength deficits, and analyze and cue movement patterns to attain remaining goals.      [x]  See Plan of Care  []  See progress note/recertification  []  See Discharge Summary         Progress towards goals / Updated goals:  Short Term Goals: To be accomplished in 2 weeks:  1. I and compliant with HEP for self management of symptoms. IE: issued HEP  2. Increase left shoulder PROM flexion to 150 degrees to increase ease with ADLs. IE:130 degrees  Long Term Goals: To be accomplished in 4 weeks:  1. Improve FOTO to 58 to indicate improved function with daily activities. IE:36  2. Increase left shoulder AROM to 150 degrees to increase ease with reaching shoulder height. IE:PROM 130  3. Increase left shoulder DAMI to C7 to increase ease with ADLs. IE:PROM 90/90 45 degrees  4. Increase left shoulder FIR to L1 to increase ease with donning/doffing undergarments. IE:40 degrees 90/90  5. Increase left shoulder strength to grossly 3+/5 to increase ease with daily activities.    IE:3-/5 grossly   PLAN  []  Upgrade activities as tolerated     [x]  Continue plan of care  []  Update interventions per flow sheet       []  Discharge due to:_  []  Other:_      Adam Vogt, MPT, CMTPT 11/15/2022  9:51 AM    Future Appointments   Date Time Provider Deidre Busby   11/18/2022  9:00 AM Basia Johnson, PT [facility]   11/21/2022  1:15 PM ABHISHEK Sorenson [facility]   11/21/2022  3:45 PM Shanique Nieves, PT [facility]   11/23/2022  7:45 AM Kristel Noble PT [facility]   11/28/2022  7:45 AM Cynthia Curry, PT [facility]   12/1/2022  1:30 PM Shanique Nieves, PT [facility]   12/6/2022 10:45 AM Cynthia Curry, PT [facility]   12/8/2022 11:15 AM Shanique Nieves, PT [facility]

## 2022-11-16 NOTE — PROGRESS NOTES
[facility]  [address] [facility] [address]  [place], [address]  [phone] fax    Physical Therapy Discharge Summary  Patient name: Teddy Helms Start of Care: 22   Referral source: Davidwill Bipin, [address] : 1960   Medical/Treatment Diagnosis: Pain in left shoulder [M25.512]  Payor: VA MEDICARE / Plan: VA MEDICARE PART A & B / Product Type: Medicare /  Onset Date:22     Prior Hospitalization: see medical history Provider#: [id number]   Medications: Verified on Patient Summary List    Comorbidities: Hearing impairment, depression, OA, HTN, asthma, right shoulder RCR   Prior Level of Function: functionally I with daily tasks. Visits from Start of Care: 11    Missed Visits: 1  Reporting Period : 22 to 22      Summary of Care:  Goals for this certification period to be accomplished in 4 weeks:                 1. Improve left shoulder PROM flexion to 120 degree for improved ability for future UE use              PN: 115 degrees  Current: unable to reassess               2. Improve left shoulder PROM scaption to 120 degrees for improved ability for future UE use              PN: 112 degrees  Current: unable to reassess               3. Improve left shoulder PROM ER to 40 degrees for improved ability for future UE use. PN- 18 degrees              Current: progressing 25 degrees                         4. The pt will progress to AAROM phase once cleared by ortho to progress towards functional use of UE. PN: PROM phase  Current: unable to reassess   Patient underwent another surgery; D/C this particular episode.    ASSESSMENT/RECOMMENDATIONS:  [x]Discontinue therapy: []Patient has reached or is progressing toward set goals      []Patient is non-compliant or has abdicated      [x]Due to lack of appreciable progress towards set Ul. Sheila Shelley, PT 2022 2:33 PM

## 2022-11-18 ENCOUNTER — APPOINTMENT (OUTPATIENT)
Dept: PHYSICAL THERAPY | Age: 62
End: 2022-11-18
Attending: PHYSICIAN ASSISTANT
Payer: MEDICARE

## 2022-11-18 ENCOUNTER — HOSPITAL ENCOUNTER (OUTPATIENT)
Dept: PHYSICAL THERAPY | Age: 62
Discharge: HOME | End: 2022-11-18
Attending: PHYSICIAN ASSISTANT
Payer: MEDICARE

## 2022-11-18 PROCEDURE — 97110 THERAPEUTIC EXERCISES: CPT

## 2022-11-18 PROCEDURE — 97140 MANUAL THERAPY 1/> REGIONS: CPT

## 2022-11-18 NOTE — PROGRESS NOTES
PT DAILY TREATMENT NOTE     Patient Name: Valentín Avina  PIJT:  : 1960  [x]  Patient  Verified  Payor: [place] MEDICARE / Plan: [place] MEDICARE PART A & B / Product Type: Medicare /    In time:926am   Out time:1029am  Total Treatment Time (min): 61  Visit #: 2 of 8    Medicare/BCBS Only   Total Timed Codes (min):  53 1:1 Treatment Time:  38   1 from 9:37am to 1015am    Treatment Area: Pain in left shoulder [M25.512]    SUBJECTIVE  Pain Level (0-10 scale): 9  Any medication changes, allergies to medications, adverse drug reactions, diagnosis change, or new procedure performed?: [x] No    [] Yes (see summary sheet for update)  Subjective functional status/changes:   [] No changes reported  Reports planning to take bandages off today. Feels itchy and uncomfortable. OBJECTIVE    Modality rationale: decrease edema, decrease inflammation, and decrease pain to improve the patients ability to manage ADLs.    Min Type Additional Details    [] Estim:  []Unatt       []IFC  []Premod                        []Other:  []w/ice   []w/heat  Position:  Location:    [] Estim: []Att    []TENS instruct  []NMES                    []Other:  []w/US   []w/ice   []w/heat  Position:  Location:    []  Traction: [] Cervical       []Lumbar                       [] Prone          []Supine                       []Intermittent   []Continuous Lbs:  [] before manual  [] after manual    []  Ultrasound: []Continuous   [] Pulsed                           []1MHz   []3MHz W/cm2:  Location:    []  Iontophoresis with dexamethasone         Location: [] Take home patch   [] In clinic    []  Ice     []  heat  []  Ice massage  []  Laser   []  Anodyne Position:  Location:    []  Laser with stim  []  Other:  Position:  Location:   10 [x]  Vasopneumatic Device    []  Right     [x]  Left  Pre-treatment girth:  Post-treatment girth:  Measured at (location):  Pressure:       [x] lo [] med [] hi   Temperature: [x] lo [] med [] hi   [] Skin assessment post-treatment:  []intact []redness- no adverse reaction    []redness - adverse reaction:     28 min Therapeutic Exercise:  [x] See flow sheet :   Rationale: increase ROM and increase strength to improve the patients ability to manage ADLs. 10 min Manual Therapy:  pt supine with wedge -- STM to UT and pecs PROM with gentle OP into flexion, ABD, IR, ER; removed bandages and inspected incisions   The manual therapy interventions were performed at a separate and distinct time from the therapeutic activities interventions. Rationale: decrease pain, increase ROM, and increase tissue extensibility to improve ADL ease. With   [] TE   [] TA   [] neuro   [] other: Patient Education: [x] Review HEP    [] Progressed/Changed HEP based on:   [] positioning   [] body mechanics   [] transfers   [] heat/ice application    [] other:      Other Objective/Functional Measures: exercises initiated for first follow-up per flowsheet     Pain Level (0-10 scale) post treatment: 7    ASSESSMENT/Changes in Function: Pt maintains forward protracted and elevated left scapula due to pain and requires frequent cueing to reduce protective posturing. Educated pt that she may take her arm out of the sling and rest it in her chair or on the arm rest of a sofa a couple times a day to help relax her posture. Assisted with bandage and tape removal, with incisions looking good. Educated pt to give the area a gentle cleanse. ROM into flexion, abd, and IR looks good today, but ER is quite tender and limited.      Patient will continue to benefit from skilled PT services to modify and progress therapeutic interventions, address functional mobility deficits, address ROM deficits, address strength deficits, analyze and address soft tissue restrictions, analyze and cue movement patterns, analyze and modify body mechanics/ergonomics, assess and modify postural abnormalities, and instruct in home and community integration to attain remaining goals. []  See Plan of Care  []  See progress note/recertification  []  See Discharge Summary         Progress towards goals / Updated goals:  Short Term Goals: To be accomplished in 2 weeks:  1. I and compliant with HEP for self management of symptoms. IE: issued HEP   2. Increase left shoulder PROM flexion to 150 degrees to increase ease with ADLs. IE:130 degrees  Long Term Goals: To be accomplished in 4 weeks:  1. Improve FOTO to 58 to indicate improved function with daily activities. IE:36  2. Increase left shoulder AROM to 150 degrees to increase ease with reaching shoulder height. IE:PROM 130  3. Increase left shoulder DAMI to C7 to increase ease with ADLs. IE:PROM 90/90 45 degrees  4. Increase left shoulder FIR to L1 to increase ease with donning/doffing undergarments. IE:40 degrees 90/90  5. Increase left shoulder strength to grossly 3+/5 to increase ease with daily activities.    IE:3-/5 grossly     PLAN  []  Upgrade activities as tolerated     [x]  Continue plan of care  []  Update interventions per flow sheet       []  Discharge due to:_  []  Other:_      Oneil Callahan, PT 11/18/2022  9:20 AM    Future Appointments   Date Time Provider Deidre Busby   11/21/2022  1:15 PM ABHISHEK Sorenson [facility]   11/21/2022  3:45 PM Shanique Nieves, PT [facility]   11/23/2022  7:45 AM Kristel Noble, PT [facility]   11/28/2022  9:45 AM Shanique Nieves, PT [facility]   12/1/2022  1:30 PM Shanique Nieves, PT [facility]   12/6/2022 10:45 AM Cynthia Curry, PT [facility]   12/8/2022 11:15 AM Shanique Nieves, PT [facility]

## 2022-11-21 ENCOUNTER — OFFICE VISIT (OUTPATIENT)
Dept: ORTHOPEDIC SURGERY | Age: 62
End: 2022-11-21
Payer: MEDICARE

## 2022-11-21 ENCOUNTER — HOSPITAL ENCOUNTER (OUTPATIENT)
Dept: PHYSICAL THERAPY | Age: 62
Discharge: HOME | End: 2022-11-21
Attending: PHYSICIAN ASSISTANT
Payer: MEDICARE

## 2022-11-21 ENCOUNTER — APPOINTMENT (OUTPATIENT)
Dept: PHYSICAL THERAPY | Age: 62
End: 2022-11-21
Attending: PHYSICIAN ASSISTANT
Payer: MEDICARE

## 2022-11-21 VITALS — HEIGHT: 65 IN | WEIGHT: 201 LBS | RESPIRATION RATE: 16 BRPM | TEMPERATURE: 97.8 F | BODY MASS INDEX: 33.49 KG/M2

## 2022-11-21 DIAGNOSIS — M67.922 BICEPS TENDINOPATHY, LEFT: Primary | ICD-10-CM

## 2022-11-21 PROCEDURE — 97140 MANUAL THERAPY 1/> REGIONS: CPT

## 2022-11-21 PROCEDURE — 97016 VASOPNEUMATIC DEVICE THERAPY: CPT

## 2022-11-21 PROCEDURE — 99024 POSTOP FOLLOW-UP VISIT: CPT | Performed by: PHYSICIAN ASSISTANT

## 2022-11-21 PROCEDURE — 97110 THERAPEUTIC EXERCISES: CPT

## 2022-11-21 NOTE — PROGRESS NOTES
PT DAILY TREATMENT NOTE 10-18    Patient Name: Margo Kearns  Date:2022  : 1960  [x]  Patient  Verified  Payor: [place] MEDICARE / Plan: [place] MEDICARE PART A & B / Product Type: Medicare /    In time:355  Out time:439  Total Treatment Time (min): 44  Visit #: 3 of 8    Medicare/BCBS Only   Total Timed Codes (min):  34 1:1 Treatment Time:  34       Treatment Area: Pain in left shoulder [M25.512]    SUBJECTIVE  Pain Level (0-10 scale): 9  Any medication changes, allergies to medications, adverse drug reactions, diagnosis change, or new procedure performed?: [x] No    [] Yes (see summary sheet for update)  Subjective functional status/changes:   [x] No changes reported      OBJECTIVE  Modality rationale: decrease pain to improve the patients ability to tolerate post exercise soreness   Min Type Additional Details   10 [x]  Vasopneumatic Device  Pre-treatment girth:  Post-treatment girth:  Measured at (location):  Pressure:       [x] lo [] med [] hi   Temperature: [x] lo [] med [] hi   [] Skin assessment post-treatment:  []intact []redness- no adverse reaction    []redness - adverse reaction:         26 min Therapeutic Exercise:  [x] See flow sheet :   Rationale: increase ROM and increase strength to improve the patients ability to perform daily activities        8 min Manual Therapy:  grade 1-2left ST mobs in right s/l; supine PROM with distraction in all directions; grade 1-3 post and inf mobs   The manual therapy interventions were performed at a separate and distinct time from the therapeutic activities interventions.   Rationale: decrease pain and increase ROM to perform ADLs  With   [] TE   [] TA   [] neuro   [] other: Patient Education: [x] Review HEP    [] Progressed/Changed HEP based on:   [] positioning   [] body mechanics   [] transfers   [] heat/ice application    [] other:      Other Objective/Functional Measures:      Pain Level (0-10 scale) post treatment: 8    ASSESSMENT/Changes in Function: Cueing to avoid postural guarding with elevated UT and protracted shoulder. Progressed patient to standing cane exercises; able to perform shoulder flex and scaption to 100 AAROM. Exhibits essentially full PROM in all directions. Patient will continue to benefit from skilled PT services to modify and progress therapeutic interventions, address functional mobility deficits, address ROM deficits, address strength deficits, and analyze and address soft tissue restrictions to attain remaining goals. []  See Plan of Care  []  See progress note/recertification  []  See Discharge Summary         Progress towards goals / Updated goals:  Short Term Goals: To be accomplished in 2 weeks:  1. I and compliant with HEP for self management of symptoms. IE: issued HEP   2. Increase left shoulder PROM flexion to 150 degrees to increase ease with ADLs. IE:130 degrees  Long Term Goals: To be accomplished in 4 weeks:  1. Improve FOTO to 58 to indicate improved function with daily activities. IE:36  2. Increase left shoulder AROM to 150 degrees to increase ease with reaching shoulder height. IE:PROM 130  3. Increase left shoulder DAMI to C7 to increase ease with ADLs. IE:PROM 90/90 45 degrees  4. Increase left shoulder FIR to L1 to increase ease with donning/doffing undergarments. IE:40 degrees 90/90  5. Increase left shoulder strength to grossly 3+/5 to increase ease with daily activities.    IE:3-/5 grossly     PLAN  [x]  Upgrade activities as tolerated     []  Continue plan of care  []  Update interventions per flow sheet       []  Discharge due to:_  []  Other:_      VERNELL Howard, CMTPT 11/21/2022  9:23 AM    Future Appointments   Date Time Provider Deidre Busby   11/21/2022  1:15 PM ABHISHEK Lopez [facility]   11/21/2022  3:45 PM Frederic Walker, SANDHYA [facility]   11/23/2022  7:45 AM Annette Underwood PT [facility]   11/28/2022  9:45 AM Frederic Walker PT [facility]   12/1/2022  [facility]:30 [facility]   12/6/2022 10:45 AM [facility]   12/8/2022 11:15 AM [facility]

## 2022-11-21 NOTE — PROGRESS NOTES
Kiesha ARMIDA Espinal  1960     HISTORY OF PRESENT ILLNESS  Jake Espinal is a 58 y.o. female who presents today for evaluation s/p Left shoulder arthroscopic removal of hardware and biceps tenotomy on 11/14/22. Patient has been going to PT. Describes pain as a 9/10. Has been taking roxicodone for pain. Still has night pain. Patient denies any fever, chills, chest pain, shortness of breath or calf pain. The remainder of the review of systems is negative. There are no new illness or injuries to report since last seen in the office. No changes in medications, allergies, social or family history. PHYSICAL EXAM:   Visit Vitals  Temp 97.8 °F (36.6 °C) (Temporal)   Resp 16   Ht 5' 4.5\" (1.638 m)   Wt 201 lb (91.2 kg)   BMI 33.97 kg/m²      The patient is a well-developed, well-nourished female in no acute distress. The patient is alert and oriented times three. The patient appears to be well groomed. Mood and affect are normal.  ORTHOPEDIC EXAM of left shoulder:  Inspection: swelling present,  Bruising present  Incision, clean, dry, intact, sutures in place  Passive glenohumeral abduction 0-35 degrees  Stability: Stable  Strength: n/a  2+ distal pulses    IMPRESSION:  S/P Left shoulder arthroscopic removal of hardware and biceps tenotomy    PLAN:   Incisions cleaned. Surgery was discussed at length today. Patient to continue with AROM and PT. Continue wearing sling. Stressed to patient that nothing causes an increase in pain.   RTC 4 weeks    CECILIA Cervantes [phone]

## 2022-11-23 ENCOUNTER — APPOINTMENT (OUTPATIENT)
Dept: PHYSICAL THERAPY | Age: 62
End: 2022-11-23
Attending: PHYSICIAN ASSISTANT
Payer: MEDICARE

## 2022-11-28 ENCOUNTER — APPOINTMENT (OUTPATIENT)
Dept: GENERAL RADIOLOGY | Age: 62
End: 2022-11-28
Attending: EMERGENCY MEDICINE
Payer: MEDICARE

## 2022-11-28 ENCOUNTER — HOSPITAL ENCOUNTER (OUTPATIENT)
Dept: PHYSICAL THERAPY | Age: 62
Discharge: HOME | End: 2022-11-28
Attending: PHYSICIAN ASSISTANT
Payer: MEDICARE

## 2022-11-28 ENCOUNTER — HOSPITAL ENCOUNTER (EMERGENCY)
Age: 62
Discharge: HOME | End: 2022-11-29
Attending: EMERGENCY MEDICINE
Payer: MEDICARE

## 2022-11-28 ENCOUNTER — APPOINTMENT (OUTPATIENT)
Dept: CT IMAGING | Age: 62
End: 2022-11-28
Attending: EMERGENCY MEDICINE
Payer: MEDICARE

## 2022-11-28 ENCOUNTER — APPOINTMENT (OUTPATIENT)
Dept: VASCULAR SURGERY | Age: 62
End: 2022-11-28
Attending: EMERGENCY MEDICINE
Payer: MEDICARE

## 2022-11-28 ENCOUNTER — APPOINTMENT (OUTPATIENT)
Dept: PHYSICAL THERAPY | Age: 62
End: 2022-11-28
Attending: PHYSICIAN ASSISTANT
Payer: MEDICARE

## 2022-11-28 VITALS
TEMPERATURE: 98.7 F | HEART RATE: 71 BPM | RESPIRATION RATE: 18 BRPM | SYSTOLIC BLOOD PRESSURE: 171 MMHG | DIASTOLIC BLOOD PRESSURE: 90 MMHG | OXYGEN SATURATION: 97 %

## 2022-11-28 DIAGNOSIS — R06.09 DYSPNEA ON EXERTION: Primary | ICD-10-CM

## 2022-11-28 DIAGNOSIS — R60.9 EDEMA, UNSPECIFIED TYPE: ICD-10-CM

## 2022-11-28 LAB
ALBUMIN SERPL-MCNC: 3.3 G/DL (ref 3.4–5)
ALBUMIN/GLOB SERPL: 1 {RATIO} (ref 0.8–1.7)
ALP SERPL-CCNC: 147 U/L (ref 45–117)
ALT SERPL-CCNC: 33 U/L (ref 13–56)
ANION GAP SERPL CALC-SCNC: 5 MMOL/L (ref 3–18)
AST SERPL-CCNC: 31 U/L (ref 10–38)
BASOPHILS # BLD: 0.1 K/UL (ref 0–0.1)
BASOPHILS NFR BLD: 1 % (ref 0–2)
BILIRUB SERPL-MCNC: 0.4 MG/DL (ref 0.2–1)
BNP SERPL-MCNC: 3582 PG/ML (ref 0–900)
BUN SERPL-MCNC: 16 MG/DL (ref 7–18)
BUN/CREAT SERPL: 13 (ref 12–20)
CALCIUM SERPL-MCNC: 8.9 MG/DL (ref 8.5–10.1)
CHLORIDE SERPL-SCNC: 109 MMOL/L (ref 100–111)
CO2 SERPL-SCNC: 27 MMOL/L (ref 21–32)
CREAT SERPL-MCNC: 1.2 MG/DL (ref 0.6–1.3)
DIFFERENTIAL METHOD BLD: ABNORMAL
EOSINOPHIL # BLD: 0.5 K/UL (ref 0–0.4)
EOSINOPHIL NFR BLD: 7 % (ref 0–5)
ERYTHROCYTE [DISTWIDTH] IN BLOOD BY AUTOMATED COUNT: 14.6 % (ref 11.6–14.5)
FLUAV RNA SPEC QL NAA+PROBE: NOT DETECTED
FLUBV RNA SPEC QL NAA+PROBE: NOT DETECTED
GLOBULIN SER CALC-MCNC: 3.4 G/DL (ref 2–4)
GLUCOSE SERPL-MCNC: 93 MG/DL (ref 74–99)
HCT VFR BLD AUTO: 28.1 % (ref 35–45)
HGB BLD-MCNC: 9 G/DL (ref 12–16)
IMM GRANULOCYTES # BLD AUTO: 0 K/UL
IMM GRANULOCYTES NFR BLD AUTO: 0 %
LYMPHOCYTES # BLD: 2 K/UL (ref 0.9–3.6)
LYMPHOCYTES NFR BLD: 27 % (ref 21–52)
MAGNESIUM SERPL-MCNC: 2.1 MG/DL (ref 1.6–2.6)
MCH RBC QN AUTO: 29.9 PG (ref 24–34)
MCHC RBC AUTO-ENTMCNC: 32 G/DL (ref 31–37)
MCV RBC AUTO: 93.4 FL (ref 78–100)
MONOCYTES # BLD: 0.2 K/UL (ref 0.05–1.2)
MONOCYTES NFR BLD: 3 % (ref 3–10)
NEUTS SEG # BLD: 4.7 K/UL (ref 1.8–8)
NEUTS SEG NFR BLD: 62 % (ref 40–73)
NRBC # BLD: 0 K/UL (ref 0–0.01)
NRBC BLD-RTO: 0 PER 100 WBC
PLATELET # BLD AUTO: 231 K/UL (ref 135–420)
PLATELET COMMENTS,PCOM: ABNORMAL
PMV BLD AUTO: 12.8 FL (ref 9.2–11.8)
POTASSIUM SERPL-SCNC: 4.4 MMOL/L (ref 3.5–5.5)
PROT SERPL-MCNC: 6.7 G/DL (ref 6.4–8.2)
RBC # BLD AUTO: 3.01 M/UL (ref 4.2–5.3)
RBC MORPH BLD: ABNORMAL
SARS-COV-2, COV2: NOT DETECTED
SODIUM SERPL-SCNC: 141 MMOL/L (ref 136–145)
TROPONIN-HIGH SENSITIVITY: 23 NG/L (ref 0–54)
WBC # BLD AUTO: 7.5 K/UL (ref 4.6–13.2)

## 2022-11-28 PROCEDURE — 97110 THERAPEUTIC EXERCISES: CPT

## 2022-11-28 PROCEDURE — 80053 COMPREHEN METABOLIC PANEL: CPT

## 2022-11-28 PROCEDURE — 97016 VASOPNEUMATIC DEVICE THERAPY: CPT

## 2022-11-28 PROCEDURE — 97530 THERAPEUTIC ACTIVITIES: CPT

## 2022-11-28 PROCEDURE — 93970 EXTREMITY STUDY: CPT

## 2022-11-28 PROCEDURE — 96374 THER/PROPH/DIAG INJ IV PUSH: CPT

## 2022-11-28 PROCEDURE — 97140 MANUAL THERAPY 1/> REGIONS: CPT

## 2022-11-28 PROCEDURE — 93005 ELECTROCARDIOGRAM TRACING: CPT

## 2022-11-28 PROCEDURE — 83735 ASSAY OF MAGNESIUM: CPT

## 2022-11-28 PROCEDURE — 74011000250 HC RX REV CODE- 250: Performed by: EMERGENCY MEDICINE

## 2022-11-28 PROCEDURE — 87636 SARSCOV2 & INF A&B AMP PRB: CPT

## 2022-11-28 PROCEDURE — 71046 X-RAY EXAM CHEST 2 VIEWS: CPT

## 2022-11-28 PROCEDURE — 94640 AIRWAY INHALATION TREATMENT: CPT

## 2022-11-28 PROCEDURE — 85025 COMPLETE CBC W/AUTO DIFF WBC: CPT

## 2022-11-28 PROCEDURE — 74011250636 HC RX REV CODE- 250/636: Performed by: EMERGENCY MEDICINE

## 2022-11-28 PROCEDURE — 83880 ASSAY OF NATRIURETIC PEPTIDE: CPT

## 2022-11-28 PROCEDURE — 84484 ASSAY OF TROPONIN QUANT: CPT

## 2022-11-28 PROCEDURE — 71250 CT THORAX DX C-: CPT

## 2022-11-28 PROCEDURE — 99285 EMERGENCY DEPT VISIT HI MDM: CPT

## 2022-11-28 RX ORDER — IPRATROPIUM BROMIDE AND ALBUTEROL SULFATE 2.5; .5 MG/3ML; MG/3ML
3 SOLUTION RESPIRATORY (INHALATION)
Status: COMPLETED | OUTPATIENT
Start: 2022-11-28 | End: 2022-11-28

## 2022-11-28 RX ADMIN — METHYLPREDNISOLONE SODIUM SUCCINATE 125 MG: 125 INJECTION, POWDER, FOR SOLUTION INTRAMUSCULAR; INTRAVENOUS at 18:53

## 2022-11-28 RX ADMIN — IPRATROPIUM BROMIDE AND ALBUTEROL SULFATE 3 ML: .5; 2.5 SOLUTION RESPIRATORY (INHALATION) at 18:53

## 2022-11-28 NOTE — Clinical Note
[facility]  [facility] EMERGENCY DEPT  [id number] [id number] [phone]    Work/School Note    Date: 11/28/2022    To Whom It May concern:    Kiesha MedinaShondaYaw was seen and treated today in the emergency room by the following provider(s):  Attending Provider: Payton Mcfarlane MD.      Raul Panda is excused from work/school on 11/29/22 and 11/30/22. She is medically clear to return to work/school on 12/1/2022.        Sincerely,          Barbra Graf MD

## 2022-11-28 NOTE — Clinical Note
[address] [place] [address] Dr [facility] EMERGENCY DEPT  [id number] [address] [phone]    Work/School Note    Date: 11/28/2022    To Whom It May concern:    Kiesha HUFFMAN Teddy was seen and treated today in the emergency room by the following provider(s):  Attending Provider: Vicki Berger MD.      Brian Frey is excused from work/school on 11/29/22 and 11/30/22. She is medically clear to return to work/school on 12/1/2022.        Sincerely,          Ade Stewart MD
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
## 2022-11-28 NOTE — PROGRESS NOTES
PT DAILY TREATMENT NOTE 10-18    Patient Name: Rosenda Hernandez  Date:2022  : 1960  [x]  Patient  Verified  Payor: [place] / Plan: [place] MED[place]ARE PART A & B / Product Type: Medicare /    In time:954  Out time:1044  Total Treatment Time (min): 50  Visit #: 4 of 8    Medicare/BCBS Only   Total Timed Codes (min):  40 1:1 Treatment Time:  40       Treatment Area: Pain in left shoulder [M25.512]    SUBJECTIVE  Pain Level (0-10 scale): 8  Any medication changes, allergies to medications, adverse drug reactions, diagnosis change, or new procedure performed?: [x] No    [] Yes (see summary sheet for update)  Subjective functional status/changes:   [x] No changes reported      OBJECTIVE  Modality rationale: decrease pain to improve the patients ability to tolerate post exercise soreness   Min Type Additional Details   10 [x]  Vasopneumatic Device  Pre-treatment girth:  Post-treatment girth:  Measured at (location):  Pressure:       [x] lo [] med [] hi   Temperature: [x] lo [] med [] hi   [] Skin assessment post-treatment:  []intact []redness- no adverse reaction    []redness - adverse reaction:         24 min Therapeutic Exercise:  [x] See flow sheet :   Rationale: increase ROM and increase strength to improve the patients ability to perform daily activities      8 min Therapeutic Activity:  [x]  See flow sheet :   Rationale: increase ROM  to improve the patients ability to perform ADLs    8 min Manual Therapy:  grade 1-2left ST mobs in right s/l; supine PROM with distraction in all directions; grade 1-3 post and inf mobs   The manual therapy interventions were performed at a separate and distinct time from the therapeutic activities interventions.   Rationale: decrease pain and increase ROM to perform ADLs  With   [] TE   [] TA   [] neuro   [] other: Patient Education: [x] Review HEP    [] Progressed/Changed HEP based on:   [] positioning   [] body mechanics   [] transfers   [] heat/ice application [] other:      Other Objective/Functional Measures:      Pain Level (0-10 scale) post treatment: 8    ASSESSMENT/Changes in Function: Patient arrives consistently late to her appointments, making it difficult to get through entire exercise program. No restrictions noted passively; mild c/o pain with PROM. High pain ratings do not correlate to ability to perform exercises in clinic. Patient will continue to benefit from skilled PT services to modify and progress therapeutic interventions, address functional mobility deficits, address ROM deficits, address strength deficits, analyze and address soft tissue restrictions, and analyze and cue movement patterns to attain remaining goals. []  See Plan of Care  []  See progress note/recertification  []  See Discharge Summary         Progress towards goals / Updated goals:  Short Term Goals: To be accomplished in 2 weeks:  1. I and compliant with HEP for self management of symptoms. IE: issued HEP   Current: reports compliance 11/28/22  2. Increase left shoulder PROM flexion to 150 degrees to increase ease with ADLs. IE:130 degrees  Long Term Goals: To be accomplished in 4 weeks:  1. Improve FOTO to 58 to indicate improved function with daily activities. IE:36  2. Increase left shoulder AROM to 150 degrees to increase ease with reaching shoulder height. IE:PROM 130  Current:140 degrees 11/28/22  3. Increase left shoulder DAMI to C7 to increase ease with ADLs. IE:PROM 90/90 45 degrees  4. Increase left shoulder FIR to L1 to increase ease with donning/doffing undergarments. IE:40 degrees 90/90  5. Increase left shoulder strength to grossly 3+/5 to increase ease with daily activities.    IE:3-/5 grossly     PLAN  [x]  Upgrade activities as tolerated     []  Continue plan of care  []  Update interventions per flow sheet       []  Discharge due to:_  []  Other:_      No Lind, VERNELL, CMTPT 11/28/2022  8:44 AM    Future Appointments   Date Time Provider Deidre Busby   11/28/2022  9:45 AM Jina CHERRY, PT [facility]   12/1/2022  1:30 PM Aida Knight, PT [facility]   12/6/2022 10:45 AM Michaelle Meek, PT [facility]   12/8/2022 11:15 AM Aida Knight, PT [facility]   12/19/2022  1:45 PM ABHISHEK Maravilla [facility]

## 2022-11-29 LAB
ATRIAL RATE: 71 BPM
CALCULATED P AXIS, ECG09: 65 DEGREES
CALCULATED R AXIS, ECG10: 58 DEGREES
CALCULATED T AXIS, ECG11: 50 DEGREES
DIAGNOSIS, 93000: NORMAL
P-R INTERVAL, ECG05: 118 MS
Q-T INTERVAL, ECG07: 430 MS
QRS DURATION, ECG06: 74 MS
QTC CALCULATION (BEZET), ECG08: 467 MS
VENTRICULAR RATE, ECG03: 71 BPM

## 2022-11-29 RX ORDER — FUROSEMIDE 40 MG/1
40 TABLET ORAL DAILY
Qty: 5 TABLET | Refills: 0 | Status: SHIPPED | OUTPATIENT
Start: 2022-11-29 | End: 2022-12-04

## 2022-11-29 NOTE — DISCHARGE INSTRUCTIONS
Please follow-up with primary care physician or referral physician next 24 to 48 hours. Call to schedule appointment.     Return to the emergency department for chest pain, shortness of breath, syncope, weakness, any other concerning symptoms

## 2022-11-29 NOTE — ED NOTES
Patient discharged with instructions from Dr. Chiquis Steward.  IV removed, no discharge questions at this time,

## 2022-12-01 ENCOUNTER — APPOINTMENT (OUTPATIENT)
Dept: PHYSICAL THERAPY | Age: 62
End: 2022-12-01
Attending: PHYSICIAN ASSISTANT
Payer: MEDICARE

## 2022-12-01 ENCOUNTER — HOSPITAL ENCOUNTER (OUTPATIENT)
Dept: PHYSICAL THERAPY | Age: 62
Discharge: HOME | End: 2022-12-01
Attending: PHYSICIAN ASSISTANT
Payer: MEDICARE

## 2022-12-01 ENCOUNTER — TELEPHONE (OUTPATIENT)
Dept: ORTHOPEDIC SURGERY | Age: 62
End: 2022-12-01

## 2022-12-01 PROCEDURE — 97140 MANUAL THERAPY 1/> REGIONS: CPT

## 2022-12-01 PROCEDURE — 97530 THERAPEUTIC ACTIVITIES: CPT

## 2022-12-01 PROCEDURE — 97110 THERAPEUTIC EXERCISES: CPT

## 2022-12-01 RX ORDER — BACLOFEN 10 MG/1
TABLET ORAL
Qty: 90 TABLET | Refills: 1 | Status: SHIPPED | OUTPATIENT
Start: 2022-12-01

## 2022-12-01 NOTE — TELEPHONE ENCOUNTER
Pt came in and said we only sent over one of her prescriptions, she still needs the one for oxycodone  10 mg

## 2022-12-01 NOTE — PROGRESS NOTES
PT DAILY TREATMENT NOTE 10-18    Patient Name: Nhung Fenton  Date:2022  : 1960  [x]  Patient  Verified  Payor: [place] MEDICARE / Plan: [place] MEDICARE PART A & B / Product Type: Medicare /    In time:217  Out time:302  Total Treatment Time (min): 45  Visit #: 5 of 8    Medicare/BCBS Only   Total Timed Codes (min):  45 1:1 Treatment Time:  45       Treatment Area: Pain in left shoulder [M25.512]    SUBJECTIVE  Pain Level (0-10 scale): 7  Any medication changes, allergies to medications, adverse drug reactions, diagnosis change, or new procedure performed?: [x] No    [] Yes (see summary sheet for update)  Subjective functional status/changes:   [x] No changes reported      OBJECTIVE      29 min Therapeutic Exercise:  [x] See flow sheet :   Rationale: increase ROM and increase strength to improve the patients ability to perform daily activities      8 min Therapeutic Activity:  []  See flow sheet :   Rationale: increase strength  to improve the patients ability to perform ADLs    8 min Manual Therapy:  grade 1-2left ST mobs in right s/l; supine PROM with distraction in all directions; grade 1-3 post and inf mobs   The manual therapy interventions were performed at a separate and distinct time from the therapeutic activities interventions. Rationale: decrease pain, increase ROM, and increase tissue extensibility to perform ADLs  With   [] TE   [] TA   [] neuro   [] other: Patient Education: [x] Review HEP    [] Progressed/Changed HEP based on:   [] positioning   [] body mechanics   [] transfers   [] heat/ice application    [] other:      Other Objective/Functional Measures:      Pain Level (0-10 scale) post treatment: 7    ASSESSMENT/Changes in Function: Pain levels are slowly decreasing and ROM is improving with each session. Started AROM in supine and s/l without increase in pain.      Patient will continue to benefit from skilled PT services to modify and progress therapeutic interventions, address functional mobility deficits, address ROM deficits, address strength deficits, and analyze and cue movement patterns to attain remaining goals. []  See Plan of Care  []  See progress note/recertification  []  See Discharge Summary         Progress towards goals / Updated goals:  Short Term Goals: To be accomplished in 2 weeks:  1. I and compliant with HEP for self management of symptoms. IE: issued HEP   Current: reports compliance 11/28/22  2. Increase left shoulder PROM flexion to 150 degrees to increase ease with ADLs. IE:130 degrees  Current:160 goal met 12/1/22  Long Term Goals: To be accomplished in 4 weeks:  1. Improve FOTO to 58 to indicate improved function with daily activities. IE:36  2. Increase left shoulder AROM to 150 degrees to increase ease with reaching shoulder height. IE:PROM 499  [id number] 12/1/22  3. Increase left shoulder DAMI to C7 to increase ease with ADLs. IE:PROM 90/90 45 degrees  4. Increase left shoulder FIR to L1 to increase ease with donning/doffing undergarments. IE:40 degrees 90/90  5. Increase left shoulder strength to grossly 3+/5 to increase ease with daily activities.    IE:3-/5 grossly     PLAN  [x]  Upgrade activities as tolerated     []  Continue plan of care  []  Update interventions per flow sheet       []  Discharge due to:_  []  Other:_      Yolande Sicard, MPT, CMTPT 12/1/2022  2:19 PM    Future Appointments   Date Time Provider Deidre Busby   12/6/2022 10:45 AM Kristen Hidalgo, PT [facility]   12/8/2022 11:15 AM Tod Virgen, PT [facility]   12/19/2022  1:45 PM ABHISHEK Smith [facility]

## 2022-12-06 ENCOUNTER — APPOINTMENT (OUTPATIENT)
Dept: PHYSICAL THERAPY | Age: 62
End: 2022-12-06
Attending: PHYSICIAN ASSISTANT
Payer: MEDICARE

## 2022-12-06 ENCOUNTER — HOSPITAL ENCOUNTER (OUTPATIENT)
Dept: PHYSICAL THERAPY | Age: 62
Discharge: HOME | End: 2022-12-06
Attending: PHYSICIAN ASSISTANT
Payer: MEDICARE

## 2022-12-06 PROCEDURE — 97140 MANUAL THERAPY 1/> REGIONS: CPT

## 2022-12-06 PROCEDURE — 97110 THERAPEUTIC EXERCISES: CPT

## 2022-12-06 PROCEDURE — 97530 THERAPEUTIC ACTIVITIES: CPT

## 2022-12-06 NOTE — PROGRESS NOTES
PT DAILY TREATMENT NOTE     Patient Name: Veronica Florian  Date:2022  : 1960  [x]  Patient  Verified  Payor: [place] MEDICARE / Plan: [place] MEDICARE PART A & B / Product Type: Medicare /    In time:1045  Out time:1135  Total Treatment Time (min): 50  Visit #: 6 of 8    Medicare/BCBS Only   Total Timed Codes (min):  40 1:1 Treatment Time:  40       Treatment Area: Pain in left shoulder [M25.512]    SUBJECTIVE  Pain Level (0-10 scale): 7  Any medication changes, allergies to medications, adverse drug reactions, diagnosis change, or new procedure performed?: [x] No    [] Yes (see summary sheet for update)  Subjective functional status/changes:   [] No changes reported  The pt reports she is hanging in there.      OBJECTIVE    Modality rationale: decrease inflammation and decrease pain to improve the patients ability to perform ADL   Min Type Additional Details    [] Estim:  []Unatt       []IFC  []Premod                        []Other:  []w/ice   []w/heat  Position:  Location:    [] Estim: []Att    []TENS instruct  []NMES                    []Other:  []w/US   []w/ice   []w/heat  Position:  Location:    []  Traction: [] Cervical       []Lumbar                       [] Prone          []Supine                       []Intermittent   []Continuous Lbs:  [] before manual  [] after manual    []  Ultrasound: []Continuous   [] Pulsed                           []1MHz   []3MHz W/cm2:  Location:    []  Iontophoresis with dexamethasone         Location: [] Take home patch   [] In clinic    []  Ice     []  heat  []  Ice massage  []  Laser   []  Anodyne Position:  Location:    []  Laser with stim  []  Other:  Position:  Location:   10 [x]  Vasopneumatic Device    []  Right     [x]  Left  Pre-treatment girth:  Post-treatment girth:  Measured at (location):  Pressure:       [x] lo [] med [] hi   Temperature: [] lo [] med [] hi   [] Skin assessment post-treatment:  []intact []redness- no adverse reaction    []redness - adverse reaction:         22 min Therapeutic Exercise:  [x] See flow sheet :   Rationale: increase ROM and increase strength to improve the patients ability to perform ADL    10 min Therapeutic Activity:  [x]  See flow sheet :   Rationale: increase strength, improve coordination, and increase proprioception  to improve the patients ability to perform ADL     8 min Manual Therapy:  ST clock mobs with pt right sidelying. Pt supine- Grade II GHJ inf and post glides and PROM of the left shoulder   The manual therapy interventions were performed at a separate and distinct time from the therapeutic activities interventions. Rationale: decrease pain, increase ROM, and increase tissue extensibility to improve functional use of the UE. With   [] TE   [] TA   [] neuro   [] other: Patient Education: [x] Review HEP    [] Progressed/Changed HEP based on:   [] positioning   [] body mechanics   [] transfers   [] heat/ice application    [] other:      Other Objective/Functional Measures: increased reps with standing dowel exercises     Pain Level (0-10 scale) post treatment: 7    ASSESSMENT/Changes in Function: The pt is progressing with therex but pain level remains fairly high on the pain scale. AAROM is showing good progress. no pain increase noted post treatment    Patient will continue to benefit from skilled PT services to modify and progress therapeutic interventions, address functional mobility deficits, address ROM deficits, address strength deficits, analyze and address soft tissue restrictions, analyze and cue movement patterns, analyze and modify body mechanics/ergonomics, and assess and modify postural abnormalities to attain remaining goals. []  See Plan of Care  []  See progress note/recertification  []  See Discharge Summary         Progress towards goals / Updated goals:  Short Term Goals: To be accomplished in 2 weeks:  1. I and compliant with HEP for self management of symptoms.    IE: issued HEP Current: reports compliance 11/28/22  2. Increase left shoulder PROM flexion to 150 degrees to increase ease with ADLs. IE:130 degrees  Current:160 goal met 12/1/22  Long Term Goals: To be accomplished in 4 weeks:  1. Improve FOTO to 58 to indicate improved function with daily activities. IE:36  2. Increase left shoulder AROM to 150 degrees to increase ease with reaching shoulder height. IE:PROM 061  SJJWFES:UNBH 884 12/1/22  3. Increase left shoulder DAMI to C7 to increase ease with ADLs. IE:PROM 90/90 45 degrees  Current: progressing with PROM with ER 12-5-22  4. Increase left shoulder FIR to L1 to increase ease with donning/doffing undergarments. IE:40 degrees 90/90  5. Increase left shoulder strength to grossly 3+/5 to increase ease with daily activities.    IE:3-/5 grossly     PLAN  []  Upgrade activities as tolerated     [x]  Continue plan of care  []  Update interventions per flow sheet       []  Discharge due to:_  []  Other:_      Adrian Kerr, PT 12/6/2022  10:47 AM    Future Appointments   Date Time Provider Deidre Busby   12/8/2022 11:15 AM Geno Moody, PT [facility]   12/13/2022 11:00 [facility]   12/15/2022 12:30 PM Adriana Fitzpatrick PTA [facility]   12/19/2022  1:45 PM Cody Sal, PA [facility]

## 2022-12-08 ENCOUNTER — TELEPHONE (OUTPATIENT)
Dept: PHYSICAL THERAPY | Age: 62
End: 2022-12-08

## 2022-12-08 ENCOUNTER — APPOINTMENT (OUTPATIENT)
Dept: PHYSICAL THERAPY | Age: 62
End: 2022-12-08
Attending: PHYSICIAN ASSISTANT
Payer: MEDICARE

## 2022-12-13 ENCOUNTER — TELEPHONE (OUTPATIENT)
Dept: PHYSICAL THERAPY | Age: 62
End: 2022-12-13

## 2022-12-15 ENCOUNTER — HOSPITAL ENCOUNTER (OUTPATIENT)
Dept: PHYSICAL THERAPY | Age: 62
Discharge: HOME | End: 2022-12-15
Attending: PHYSICIAN ASSISTANT
Payer: MEDICARE

## 2022-12-15 PROCEDURE — 97110 THERAPEUTIC EXERCISES: CPT

## 2022-12-15 PROCEDURE — 97140 MANUAL THERAPY 1/> REGIONS: CPT

## 2022-12-15 NOTE — PROGRESS NOTES
PT DAILY TREATMENT NOTE     Patient Name: Valentín Avina  IVWV:  : 1960  [x]  Patient  Verified  Payor: VA MEDICARE / Plan: VA MEDICARE PART A & B / Product Type: Medicare /    In time:12:30  Out time:1:09  Total Treatment Time (min): 39  Visit #: 1 of 8    Medicare/BCBS Only   Total Timed Codes (min):  39 1:1 Treatment Time:  29       Treatment Area: Pain in left shoulder [M25.512]    SUBJECTIVE  Pain Level (0-10 scale): 7/10  Any medication changes, allergies to medications, adverse drug reactions, diagnosis change, or new procedure performed?: [x] No    [] Yes (see summary sheet for update)  Subjective functional status/changes:   [] No changes reported  Pt reports increased pain currently due to the weather. OBJECTIVE    Modality rationale: decrease inflammation and decrease pain to improve the patients ability to perform ADLs with increased ease.     Min Type Additional Details    [] Estim:  []Unatt       []IFC  []Premod                        []Other:  []w/ice   []w/heat  Position:  Location:    [] Estim: []Att    []TENS instruct  []NMES                    []Other:  []w/US   []w/ice   []w/heat  Position:  Location:    []  Traction: [] Cervical       []Lumbar                       [] Prone          []Supine                       []Intermittent   []Continuous Lbs:  [] before manual  [] after manual    []  Ultrasound: []Continuous   [] Pulsed                           []1MHz   []3MHz W/cm2:  Location:    []  Iontophoresis with dexamethasone         Location: [] Take home patch   [] In clinic    []  Ice     []  heat  []  Ice massage  []  Laser   []  Anodyne Position:  Location:    []  Laser with stim  []  Other:  Position:  Location:   10 [x]  Vasopneumatic Device    []  Right     [x]  Left  Pre-treatment girth:  Post-treatment girth:  Measured at (location):  Pressure:       [x] lo [] med [] hi   Temperature: [x] lo [] med [] hi   [] Skin assessment post-treatment:  []intact []redness- no adverse reaction    []redness - adverse reaction:       20 min Therapeutic Exercise:  [x] See flow sheet :   Rationale: increase ROM and increase strength to improve the patients ability to perform ADLs with increased ease. 11 min Therapeutic Activity:  [x]  See flow sheet :   Rationale: increase strength, improve coordination, and increase proprioception  to improve the patients ability to perform ADLs with increased ease. 8 min Manual Therapy:  Pt in sidelying: ST clocks, STM to left UT/LS; PRM to left shoulder in all directions with patient in supine. The manual therapy interventions were performed at a separate and distinct time from the therapeutic activities interventions. Rationale: decrease pain, increase ROM, and increase tissue extensibility to perform ADLs with increased ease. With   [] TE   [] TA   [] neuro   [] other: Patient Education: [x] Review HEP    [] Progressed/Changed HEP based on:   [] positioning   [] body mechanics   [] transfers   [] heat/ice application    [] other:      Other Objective/Functional Measures: PROM left shoulder ER at 90/90: 90 degrees. Pain Level (0-10 scale) post treatment: 7/10    ASSESSMENT/Changes in Function: Pt demonstrates good available PROM in all directions with reports of discomfort at end range. Pt has good control and AAROM with all therapeutic exercises. Patient will continue to benefit from skilled PT services to modify and progress therapeutic interventions, address functional mobility deficits, address ROM deficits, address strength deficits, analyze and address soft tissue restrictions, analyze and cue movement patterns, analyze and modify body mechanics/ergonomics, and assess and modify postural abnormalities to attain remaining goals.      []  See Plan of Care  []  See progress note/recertification  []  See Discharge Summary         Progress towards goals / Updated goals:  Goals for this certification period to be accomplished in 4 weeks:  1. Improve FOTO to 58 to indicate improved function with daily activities. Recert: Not yet issued. 2. Increase left shoulder AROM to 150 degrees to increase ease with reaching shoulder height. Recert:AROM 017 61/4/14  3. Increase left shoulder DAMI to C7 to increase ease with ADLs. Recert: progressing with PROM with ER 12-5-22  4. Increase left shoulder FIR to L1 to increase ease with donning/doffing undergarments. Recert: Not yet assessed. 5. Increase left shoulder strength to grossly 3+/5 to increase ease with daily activities. Recert: remains. 3-/5.     PLAN  []  Upgrade activities as tolerated     [x]  Continue plan of care  []  Update interventions per flow sheet       []  Discharge due to:_  []  Other:_      Aleena Hwang PTA 12/15/2022  12:29 PM    Future Appointments   Date Time Provider Deidre Busby   12/15/2022 12:30 PM [place], PTA [facility]   12/19/2022  1:45 PM ABHISHEK Liang [facility]   12/20/2022  8:30 AM [place], PTA [facility]   12/21/2022  9:00 AM [place], PTA [facility]

## 2022-12-15 NOTE — PROGRESS NOTES
[facility]  [address] [facility] [address]  Juna[place] treviño, [address]  [phone] fax    Continued Plan of Care/ Re-certification for Physical Therapy Services    Patient name: Marcial Flaherty Start of Care: 11/15/2022   Referral source: [place] : 1960               Medical Diagnosis: Nontraumatic tear of left rotator cuff, unspecified tear extent [M75.102]  Payor: VA MEDICARE / Plan: VA MEDICARE PART A & B / Product Type: Medicare /  Onset Date:22               Treatment Diagnosis: Left shoulder s/p debridement   Prior Hospitalization: see medical history Provider#: [id number]   Medications: Verified on Patient summary List    Comorbidities: Left RTC repair /; nothing else reported   Prior Level of Function: LHD; limited with daily activities and ADLs prior to recent surgery  Visits from Start of Care: 7    Missed Visits: 2    The Plan of Care and following information is based on the patient's current status:  Short Term Goals: To be accomplished in 2 weeks:  1. I and compliant with HEP for self management of symptoms. IE: issued HEP   Current: reports compliance 22  2. Increase left shoulder PROM flexion to 150 degrees to increase ease with ADLs. IE:130 degrees  Current:160 goal met 22  Long Term Goals: To be accomplished in 4 weeks:  1. Improve FOTO to 58 to indicate improved function with daily activities. IE:36  Current: Not yet issued. 2. Increase left shoulder AROM to 150 degrees to increase ease with reaching shoulder height. IE:PROM 824  [id number]:HXFS 380 22  3. Increase left shoulder DAMI to C7 to increase ease with ADLs. IE:PROM 90/90 45 degrees  Current: progressing with PROM with ER 22  4. Increase left shoulder FIR to L1 to increase ease with donning/doffing undergarments. IE:40 degrees 90/90  Current: Not yet assessed.   5. Increase left shoulder strength to grossly 3+/5 to increase ease with daily activities. IE:3-/5 grossly   Current: remains. 3-/5. Key functional changes: increased available AROM and decreased pain. Problems/ barriers to goal attainment: none     Problem List: pain affecting function, decrease ROM, decrease strength, decrease ADL/ functional abilitiies, decrease activity tolerance, and decrease flexibility/ joint mobility    Treatment Plan: Therapeutic exercise, Neuromuscular reeducation, Manual therapy, Therapeutic activity, Self care/home management, and Vasopneumatic device     Patient Goal (s) has been updated and includes: \"to be pain free and be able to lift my arm over my head\"     Goals for this certification period to be accomplished in 4 weeks:  1. Improve FOTO to 58 to indicate improved function with daily activities. Recert: Not yet issued. 2. Increase left shoulder AROM to 150 degrees to increase ease with reaching shoulder height. Recert:AROM 233   3. Increase left shoulder DAMI to C7 to increase ease with ADLs. Recert: progressing with PROM with ER 12-5-22  4. Increase left shoulder FIR to L1 to increase ease with donning/doffing undergarments. Recert: Not yet assessed. 5. Increase left shoulder strength to grossly 3+/5 to increase ease with daily activities. Recert: remains. 3-/5. Frequency / Duration: Patient to be seen 2 times per week for 4 weeks:    Assessment / Recommendations: The pt is progressing with therex but pain level remains fairly high on the pain scale. AAROM is showing good progress. no pain increase noted post treatment. Patient will continue to benefit from skilled PT services to modify and progress therapeutic interventions, address functional mobility deficits, address ROM deficits, address strength deficits, analyze and address soft tissue restrictions, analyze and cue movement patterns, analyze and modify body mechanics/ergonomics, and assess and modify postural abnormalities to attain remaining goals.     Certification Period: 12/15/2022-01/13/2023    Jovanna Fitzpatrick, PTA 12/15/2022 12:27 PM  Gomez Espinosa, MPT, CMTPT  ________________________________________________________________________  I certify that the above Therapy Services are being furnished while the patient is under my care. I agree with the treatment plan and certify that this therapy is necessary. [] I have read the above and request that my patient continue as recommended.   [] I have read the above report and request that my patient continue therapy with the following changes/special instructions: _____________________________________________  [] I have read the above report and request that my patient be discharged from therapy    Physician's Signature:____________Date:_________TIME:________     Luellen Mcburney, PA  ** Signature, Date and Time must be completed for valid certification **    Please sign and return to [facility] [address] [place] [address] & [facility]  [address] [facility] [address]  [place], [facility] [address]  [phone] fax

## 2022-12-20 ENCOUNTER — HOSPITAL ENCOUNTER (OUTPATIENT)
Dept: PHYSICAL THERAPY | Age: 62
Discharge: HOME | End: 2022-12-20
Attending: PHYSICIAN ASSISTANT
Payer: MEDICARE

## 2022-12-20 PROCEDURE — 97110 THERAPEUTIC EXERCISES: CPT

## 2022-12-20 PROCEDURE — 97140 MANUAL THERAPY 1/> REGIONS: CPT

## 2022-12-20 PROCEDURE — 97016 VASOPNEUMATIC DEVICE THERAPY: CPT

## 2022-12-20 NOTE — PROGRESS NOTES
PT DAILY TREATMENT NOTE     Patient Name: Marcial Flaherty  HCMI:  : 1960  [x]  Patient  Verified  Payor: VA MEDICARE / Plan: VA MEDICARE PART A & B / Product Type: Medicare /    In time:8:40  Out time:9:23  Total Treatment Time (min): 43  Visit #: 2 of 8    Medicare/BCBS Only   Total Timed Codes (min):  33 1:1 Treatment Time:  33       Treatment Area: Pain in left shoulder [M25.512]    SUBJECTIVE  Pain Level (0-10 scale): 8/10  Any medication changes, allergies to medications, adverse drug reactions, diagnosis change, or new procedure performed?: [x] No    [] Yes (see summary sheet for update)  Subjective functional status/changes:   [] No changes reported  Pt reports her pain is still high. OBJECTIVE    Modality rationale: decrease inflammation and decrease pain to improve the patients ability to perform ADLs with increased ease.     Min Type Additional Details    [] Estim:  []Unatt       []IFC  []Premod                        []Other:  []w/ice   []w/heat  Position:  Location:    [] Estim: []Att    []TENS instruct  []NMES                    []Other:  []w/US   []w/ice   []w/heat  Position:  Location:    []  Traction: [] Cervical       []Lumbar                       [] Prone          []Supine                       []Intermittent   []Continuous Lbs:  [] before manual  [] after manual    []  Ultrasound: []Continuous   [] Pulsed                           []1MHz   []3MHz W/cm2:  Location:    []  Iontophoresis with dexamethasone         Location: [] Take home patch   [] In clinic    []  Ice     []  heat  []  Ice massage  []  Laser   []  Anodyne Position:  Location:    []  Laser with stim  []  Other:  Position:  Location:   10 [x]  Vasopneumatic Device    []  Right     [x]  Left  Pre-treatment girth:  Post-treatment girth:  Measured at (location):  Pressure:       [x] lo [] med [] hi   Temperature: [x] lo [] med [] hi   [] Skin assessment post-treatment:  []intact []redness- no adverse reaction    []redness - adverse reaction:     15 min Therapeutic Exercise:  [x] See flow sheet :   Rationale: increase ROM and increase strength to improve the patients ability to perform ADLs with increased ease. 10 min Neuromuscular Re-education:  [x]  See flow sheet :   Rationale: increase strength, improve coordination, and increase proprioception  to improve the patients ability to perform ADLs with increased ease. 8 min Manual Therapy:  Pt in sidelying: scapular clock; Pt in supine: PROM all planes to left shoulder. The manual therapy interventions were performed at a separate and distinct time from the therapeutic activities interventions. Rationale: decrease pain, increase ROM, increase tissue extensibility, and decrease trigger points to perform functional activities with increased ease. With   [] TE   [] TA   [] neuro   [] other: Patient Education: [x] Review HEP    [] Progressed/Changed HEP based on:   [] positioning   [] body mechanics   [] transfers   [] heat/ice application    [] other:      Other Objective/Functional Measures: Held exercises as per flow sheet due to patient arriving 10 minutes late. Pain Level (0-10 scale) post treatment: 7/10    ASSESSMENT/Changes in Function:  Pt demonstrates full PROM all planes but continues to complain of unremitting pain in her shoulder and UT. Patient will continue to benefit from skilled PT services to modify and progress therapeutic interventions, address functional mobility deficits, address ROM deficits, address strength deficits, analyze and address soft tissue restrictions, analyze and cue movement patterns, analyze and modify body mechanics/ergonomics, assess and modify postural abnormalities, and address imbalance/dizziness to attain remaining goals.      []  See Plan of Care  []  See progress note/recertification  []  See Discharge Summary         Progress towards goals / Updated goals:  Goals for this certification period to be accomplished in 4 weeks:  1. Improve FOTO to 58 to indicate improved function with daily activities. Recert: Not yet issued. 2. Increase left shoulder AROM to 150 degrees to increase ease with reaching shoulder height. Recert:AROM 690 70/5/46  3. Increase left shoulder DAMI to C7 to increase ease with ADLs. Recert: progressing with PROM with ER 12-5-22  4. Increase left shoulder FIR to L1 to increase ease with donning/doffing undergarments. Recert: Not yet assessed. 5. Increase left shoulder strength to grossly 3+/5 to increase ease with daily activities. Recert: remains. 3-/5.     PLAN  []  Upgrade activities as tolerated     [x]  Continue plan of care  []  Update interventions per flow sheet       []  Discharge due to:_  []  Other:_      Eileen Jiang PTA 12/20/2022  8:47 AM    Future Appointments   Date Time Provider Deidre Busby   12/21/2022  9:00 AM Narda Ortega PTA [facility]

## 2022-12-21 ENCOUNTER — HOSPITAL ENCOUNTER (OUTPATIENT)
Dept: PHYSICAL THERAPY | Age: 62
Discharge: HOME | End: 2022-12-21
Attending: PHYSICIAN ASSISTANT
Payer: MEDICARE

## 2022-12-21 PROCEDURE — 97110 THERAPEUTIC EXERCISES: CPT

## 2022-12-21 PROCEDURE — 97140 MANUAL THERAPY 1/> REGIONS: CPT

## 2022-12-21 PROCEDURE — 97016 VASOPNEUMATIC DEVICE THERAPY: CPT

## 2022-12-21 NOTE — PROGRESS NOTES
PT DAILY TREATMENT NOTE     Patient Name: Kelvin Franco  QYNM:  : 1960  [x]  Patient  Verified  Payor: [place] MED[place] / Plan: [place] MED[place] A & B / Product Type: Medicare /    In time:10:38  Out time:11:23  Total Treatment Time (min): 45  Visit #: 3 of 8    Medicare/BCBS Only   Total Timed Codes (min):  35 1:1 Treatment Time:  35       Treatment Area: Pain in left shoulder [M25.512]    SUBJECTIVE  Pain Level (0-10 scale): 8/10  Any medication changes, allergies to medications, adverse drug reactions, diagnosis change, or new procedure performed?: [x] No    [] Yes (see summary sheet for update)  Subjective functional status/changes:   [] No changes reported  Pt reports no new complaint of pain. Pt reports compliance with HEP. OBJECTIVE    Modality rationale: decrease inflammation and decrease pain to improve the patients ability to perform ADLs with increased ease.     Min Type Additional Details    [] Estim:  []Unatt       []IFC  []Premod                        []Other:  []w/ice   []w/heat  Position:  Location:    [] Estim: []Att    []TENS instruct  []NMES                    []Other:  []w/US   []w/ice   []w/heat  Position:  Location:    []  Traction: [] Cervical       []Lumbar                       [] Prone          []Supine                       []Intermittent   []Continuous Lbs:  [] before manual  [] after manual    []  Ultrasound: []Continuous   [] Pulsed                           []1MHz   []3MHz W/cm2:  Location:    []  Iontophoresis with dexamethasone         Location: [] Take home patch   [] In clinic    []  Ice     []  heat  []  Ice massage  []  Laser   []  Anodyne Position:  Location:    []  Laser with stim  []  Other:  Position:  Location:   10 [x]  Vasopneumatic Device    []  Right     [x]  Left  Pre-treatment girth:  Post-treatment girth:  Measured at (location):  Pressure:       [x] lo [] med [] hi   Temperature: [x] lo [] med [] hi   [] Skin assessment post-treatment:  []intact []redness- no adverse reaction    []redness - adverse reaction:     25 min Therapeutic Exercise:  [x] See flow sheet :   Rationale: increase ROM and increase strength to improve the patients ability to perform ADLs with increased ease. 10 min Neuromuscular Re-education:  [x]  See flow sheet :   Rationale: increase strength and improve coordination  to improve the patients ability to perform ADLs with increased ease. With   [] TE   [] TA   [] neuro   [] other: Patient Education: [x] Review HEP    [] Progressed/Changed HEP based on:   [] positioning   [] body mechanics   [] transfers   [] heat/ice application    [] other:      Other Objective/Functional Measures: FIR:S1; DAMI C7. Pain Level (0-10 scale) post treatment: 7/10    ASSESSMENT/Changes in Function: Pt demonstrates improved left shoulder AROM but has limited FIR with pain at end range. Patient will continue to benefit from skilled PT services to modify and progress therapeutic interventions, address functional mobility deficits, address ROM deficits, address strength deficits, analyze and address soft tissue restrictions, analyze and cue movement patterns, analyze and modify body mechanics/ergonomics, and assess and modify postural abnormalities to attain remaining goals. []  See Plan of Care  []  See progress note/recertification  []  See Discharge Summary         Progress towards goals / Updated goals:  Goals for this certification period to be accomplished in 4 weeks:  1. Improve FOTO to 58 to indicate improved function with daily activities. Recert: Not yet issued. 2. Increase left shoulder AROM to 150 degrees to increase ease with reaching shoulder height. Recert:AROM 766 73/2/04  Current: Met 160.  12/21/2022  3. Increase left shoulder DAMI to C7 to increase ease with ADLs.   Recert: DAMI   Current:Met DAMI C7.  4. Increase left shoulder FIR to L1 to increase ease with donning/doffing undergarments. Recert: Not yet assessed. Current: Progressing FIR to S1. 12/21/2022  5. Increase left shoulder strength to grossly 3+/5 to increase ease with daily activities. Recert: remains. 3-/5.     PLAN  []  Upgrade activities as tolerated     [x]  Continue plan of care  []  Update interventions per flow sheet       []  Discharge due to:_  []  Other:_      Desena Camejo, MAYRA 12/21/2022  10:42 AM    Future Appointments   Date Time Provider Deidre Busby   1/4/2023 10:00 AM Dominic Moralez, PTA [facility]   1/5/2023 10:00 AM Renetta Sanchez, [facility]   1/5/2023 11:15 AM Janie Daly, ABHISHEK Et[facility] BS AMB

## 2023-01-04 ENCOUNTER — APPOINTMENT (OUTPATIENT)
Dept: PHYSICAL THERAPY | Age: 63
End: 2023-01-04
Attending: PHYSICIAN ASSISTANT
Payer: MEDICARE

## 2023-01-04 RX ORDER — BACLOFEN 10 MG/1
TABLET ORAL
Qty: 90 TABLET | Refills: 1 | Status: SHIPPED | OUTPATIENT
Start: 2023-01-04

## 2023-01-05 ENCOUNTER — HOSPITAL ENCOUNTER (OUTPATIENT)
Dept: PHYSICAL THERAPY | Age: 63
Discharge: HOME | End: 2023-01-05
Attending: PHYSICIAN ASSISTANT
Payer: MEDICARE

## 2023-01-05 ENCOUNTER — OFFICE VISIT (OUTPATIENT)
Dept: ORTHOPEDIC SURGERY | Age: 63
End: 2023-01-05
Payer: MEDICARE

## 2023-01-05 VITALS — WEIGHT: 201 LBS | HEIGHT: 65 IN | BODY MASS INDEX: 33.49 KG/M2

## 2023-01-05 DIAGNOSIS — M67.922 BICEPS TENDINOPATHY, LEFT: ICD-10-CM

## 2023-01-05 DIAGNOSIS — M75.102 NONTRAUMATIC TEAR OF LEFT ROTATOR CUFF, UNSPECIFIED TEAR EXTENT: Primary | ICD-10-CM

## 2023-01-05 PROCEDURE — 99024 POSTOP FOLLOW-UP VISIT: CPT | Performed by: PHYSICIAN ASSISTANT

## 2023-01-05 PROCEDURE — 97016 VASOPNEUMATIC DEVICE THERAPY: CPT

## 2023-01-05 PROCEDURE — 97112 NEUROMUSCULAR REEDUCATION: CPT

## 2023-01-05 PROCEDURE — 97110 THERAPEUTIC EXERCISES: CPT

## 2023-01-05 RX ORDER — METHYLPREDNISOLONE 4 MG/1
TABLET ORAL
Qty: 1 DOSE PACK | Refills: 0 | Status: SHIPPED | OUTPATIENT
Start: 2023-01-05

## 2023-01-05 NOTE — PROGRESS NOTES
Kiesha ARMIDA Espinal  1960     HISTORY OF PRESENT ILLNESS  Candance Basset Rogers-Pitt is a 58 y.o. female who presents today for evaluation s/p Left shoulder arthroscopic removal of hardware and biceps tenotomy on 11/14/22. Patient has been going to PT . Describes pain as a 710. She states in last few weeks the pain is more persistent. Patient denies any fever, chills, chest pain, shortness of breath or calf pain. The remainder of the review of systems is negative. There are no new illness or injuries to report since last seen in the office. No changes in medications, allergies, social or family history. PHYSICAL EXAM:   There were no vitals taken for this visit. The patient is a well-developed, well-nourished female in no acute distress. The patient is alert and oriented times three. The patient appears to be well groomed. Mood and affect are normal.  ORTHOPEDIC EXAM of left shoulder:    Inspection: swelling present,  Bruising not present  Incisions well healed  Passive glenohumeral abduction 0-90 degrees  Stability: Stable  Strength: 4/5  2+ distal pulses    IMPRESSION:  S/P Left shoulder arthroscopic removal of hardware and biceps tenotomy    PLAN:   Patient with slight increase pain likely from overuse. We will place her on a Medrol Dosepak today. She will continue with her medication via pain management.   We will hold on physical therapy until I see her again next week    RTC 1 weeks    CECILIA [facility]

## 2023-01-05 NOTE — PROGRESS NOTES
PT DAILY TREATMENT NOTE     Patient Name: Donis Nava  Date:2023  : 1960  [x]  Patient  Verified  Payor: [place] MEDICARE / Plan: [place] MEDICARE PART A & B / Product Type: Medicare /    In time:10:08  Out time:10:51  Total Treatment Time (min): 43  Visit #: 4 of 8    Medicare/BCBS Only   Total Timed Codes (min):  33 1:1 Treatment Time:  33     Treatment Area: Pain in left shoulder [M25.512]    SUBJECTIVE  Pain Level (0-10 scale): 7-810  Any medication changes, allergies to medications, adverse drug reactions, diagnosis change, or new procedure performed?: [x] No    [] Yes (see summary sheet for update)  Subjective functional status/changes:   [] No changes reported  \"Still hard to sleep and reaching behind my back. Constant pain down the arm. \"    OBJECTIVE    Modality rationale: decrease inflammation and decrease pain to improve the patients ability to perform ADL's.    Min Type Additional Details    [] Estim:  []Unatt       []IFC  []Premod                        []Other:  []w/ice   []w/heat  Position:  Location:    [] Estim: []Att    []TENS instruct  []NMES                    []Other:  []w/US   []w/ice   []w/heat  Position:  Location:    []  Traction: [] Cervical       []Lumbar                       [] Prone          []Supine                       []Intermittent   []Continuous Lbs:  [] before manual  [] after manual    []  Ultrasound: []Continuous   [] Pulsed                           []1MHz   []3MHz W/cm2:  Location:    []  Iontophoresis with dexamethasone         Location: [] Take home patch   [] In clinic    []  Ice     []  heat  []  Ice massage  []  Laser   []  Anodyne Position:  Location:    []  Laser with stim  []  Other:  Position:  Location:   10 [x]  Vasopneumatic Device    []  Right     [x]  Left  Pre-treatment girth:  Post-treatment girth:  Measured at (location):  Pressure:       [x] lo [] med [] hi   Temperature: [x] lo [] med [] hi   [] Skin assessment post-treatment:  []intact []redness- no adverse reaction    []redness - adverse reaction:     25 min Therapeutic Exercise:  [x] See flow sheet :   Rationale: increase ROM and increase strength to improve the patients ability to perform ADL's.     8 min Neuromuscular Re-education:  [x]  See flow sheet : Supine PNF D1/D2. Rationale: increase strength and increase proprioception  to improve the patients ability to perform functional activities. With   [x] TE   [] TA   [] neuro   [] other: Patient Education: [x] Review HEP    [] Progressed/Changed HEP based on:   [] positioning   [] body mechanics   [] transfers   [] heat/ice application    [] other:      Other Objective/Functional Measures: Compensatory Left scapular elevation with exercises, partially corrects with cueing. Pain Level (0-10 scale) post treatment: 8/10    ASSESSMENT/Changes in Function: Left shoulder AAROM is progressing well, fatigues quickly with AROM PRE's. No significant change in pain level. Challenged with supine PNF series. Continue PT to increase Left shoulder AROM and strength/endurance to improve ease with performing ADL's and functional activities. Patient will continue to benefit from skilled PT services to modify and progress therapeutic interventions, address functional mobility deficits, address ROM deficits, address strength deficits, analyze and address soft tissue restrictions, analyze and cue movement patterns, and analyze and modify body mechanics/ergonomics to attain remaining goals. [x]  See Plan of Care  []  See progress note/recertification  []  See Discharge Summary         Progress towards goals / Updated goals:  Goals for this certification period to be accomplished in 4 weeks:  1. Improve FOTO to 58 to indicate improved function with daily activities. Recert: Not yet issued. 2. Increase left shoulder AROM to 150 degrees to increase ease with reaching shoulder height. Recert:AROM 068 06/5/21  Current: Met 160. 12/21/2022  3. Increase left shoulder DAMI to C7 to increase ease with ADLs. Recert: DAMI   Current:Met DAMI C7.  4. Increase left shoulder FIR to L1 to increase ease with donning/doffing undergarments. Recert: Not yet assessed. Current: Progressing FIR to S1. 12/21/2022  5. Increase left shoulder strength to grossly 3+/5 to increase ease with daily activities. Recert: remains. 3-/5.     PLAN  []  Upgrade activities as tolerated     [x]  Continue plan of care  []  Update interventions per flow sheet       []  Discharge due to:_  []  Other:_      Jace Love, MAYRA 1/5/2023  10:09 AM    Future Appointments   Date Time Provider Deidre Busby   1/5/2023 11:15 AM Gabriel Redman PA [facility] BS AMB

## 2023-01-09 ENCOUNTER — APPOINTMENT (OUTPATIENT)
Dept: PHYSICAL THERAPY | Age: 63
End: 2023-01-09
Attending: PHYSICIAN ASSISTANT
Payer: MEDICARE

## 2023-01-11 ENCOUNTER — APPOINTMENT (OUTPATIENT)
Dept: PHYSICAL THERAPY | Age: 63
End: 2023-01-11
Attending: PHYSICIAN ASSISTANT
Payer: MEDICARE

## 2023-01-27 RX ORDER — BACLOFEN 10 MG/1
TABLET ORAL
Qty: 90 TABLET | Refills: 1 | Status: SHIPPED | OUTPATIENT
Start: 2023-01-27

## 2023-01-30 ENCOUNTER — OFFICE VISIT (OUTPATIENT)
Dept: ORTHOPEDIC SURGERY | Age: 63
End: 2023-01-30
Payer: MEDICARE

## 2023-01-30 VITALS — BODY MASS INDEX: 35.17 KG/M2 | WEIGHT: 206 LBS | TEMPERATURE: 96 F | HEIGHT: 64 IN

## 2023-01-30 DIAGNOSIS — M75.102 NONTRAUMATIC TEAR OF LEFT ROTATOR CUFF, UNSPECIFIED TEAR EXTENT: Primary | ICD-10-CM

## 2023-01-30 DIAGNOSIS — M67.922 BICEPS TENDINOPATHY, LEFT: ICD-10-CM

## 2023-01-30 PROCEDURE — 99024 POSTOP FOLLOW-UP VISIT: CPT | Performed by: PHYSICIAN ASSISTANT

## 2023-01-30 NOTE — PROGRESS NOTES
Kevinkemar Espinal  1960     HISTORY OF PRESENT ILLNESS  Lydia Espinal is a 58 y.o. female who presents today for reevaluation s/p Left shoulder arthroscopic removal of hardware and biceps tenotomy on 11/14/22. Patient has been holding on PT as advised at last OV. Describes pain as a 7/10. She states in last few weeks the pain is more persistent. Was prescribed Medrol Dosepak at last OV. States she has pain in the inside of her shoulder. Reports no change in symptoms since last OV. She has pain with laying down and even while at rest. Is unable to lay on her left shoulder. Patient denies any fever, chills, chest pain, shortness of breath or calf pain. The remainder of the review of systems is negative. There are no new illness or injuries to report since last seen in the office. No changes in medications, allergies, social or family history. PHYSICAL EXAM:   Visit Vitals  Temp (!) 96 °F (35.6 °C) (Temporal)   Ht 5' 4\" (1.626 m)   Wt 206 lb (93.4 kg)   BMI 35.36 kg/m²        The patient is a well-developed, well-nourished female in no acute distress. The patient is alert and oriented times three. The patient appears to be well groomed. Mood and affect are normal.  ORTHOPEDIC EXAM of left shoulder:    Inspection: swelling present,  Bruising not present  Incisions well healed  Passive glenohumeral abduction 0-90 degrees  Stability: Stable  Strength: 4/5  2+ distal pulses    IMPRESSION:  S/P Left shoulder arthroscopic removal of hardware and biceps tenotomy  Encounter Diagnoses   Name Primary? Nontraumatic tear of left rotator cuff, unspecified tear extent Yes    Biceps tendinopathy, left          PLAN:   Patient presents with persistent postoperative pain despite taking MDP and holding on PT. Will be ordering STAT left shoulder MRI to r/o rotator cuff re tear and assess biceps tendon.     RTC following MRI with Dr. Skinny Nagel    Scribed by May Cullen [facility]) as dictated by Minerva Bradshaw CECILIA Herrera, [facility]

## 2023-01-31 ENCOUNTER — TELEPHONE (OUTPATIENT)
Dept: ORTHOPEDIC SURGERY | Age: 63
End: 2023-01-31

## 2023-01-31 DIAGNOSIS — M75.102 NONTRAUMATIC TEAR OF LEFT ROTATOR CUFF, UNSPECIFIED TEAR EXTENT: Primary | ICD-10-CM

## 2023-01-31 RX ORDER — LORAZEPAM 1 MG/1
TABLET ORAL
Qty: 3 TABLET | Refills: 0 | Status: SHIPPED | OUTPATIENT
Start: 2023-01-31

## 2023-01-31 NOTE — TELEPHONE ENCOUNTER
Patient called and stated that she is having an MRI on 02/03/2023. She requests a medication before the MRI starts to help her with anxiety.    Pharmacy-on file     Please advise, call  [phone]

## 2023-02-03 ENCOUNTER — HOSPITAL ENCOUNTER (OUTPATIENT)
Age: 63
End: 2023-02-03
Attending: PHYSICIAN ASSISTANT
Payer: MEDICARE

## 2023-02-03 DIAGNOSIS — M67.922 BICEPS TENDINOPATHY, LEFT: ICD-10-CM

## 2023-02-03 DIAGNOSIS — M75.102 NONTRAUMATIC TEAR OF LEFT ROTATOR CUFF, UNSPECIFIED TEAR EXTENT: ICD-10-CM

## 2023-02-03 PROCEDURE — 73221 MRI JOINT UPR EXTREM W/O DYE: CPT

## 2023-02-13 ENCOUNTER — HOSPITAL ENCOUNTER (EMERGENCY)
Facility: HOSPITAL | Age: 63
Discharge: HOME | End: 2023-02-13
Attending: EMERGENCY MEDICINE
Payer: MEDICARE

## 2023-02-13 ENCOUNTER — APPOINTMENT (OUTPATIENT)
Facility: HOSPITAL | Age: 63
End: 2023-02-13
Payer: MEDICARE

## 2023-02-13 VITALS
HEIGHT: 64 IN | DIASTOLIC BLOOD PRESSURE: 77 MMHG | RESPIRATION RATE: 18 BRPM | TEMPERATURE: 98.6 F | WEIGHT: 200 LBS | OXYGEN SATURATION: 98 % | SYSTOLIC BLOOD PRESSURE: 126 MMHG | HEART RATE: 68 BPM | BODY MASS INDEX: 34.15 KG/M2

## 2023-02-13 DIAGNOSIS — M79.662 PAIN IN LEFT LOWER LEG: Primary | ICD-10-CM

## 2023-02-13 DIAGNOSIS — M25.552 LEFT HIP PAIN: ICD-10-CM

## 2023-02-13 PROCEDURE — 6360000002 HC RX W HCPCS: Performed by: EMERGENCY MEDICINE

## 2023-02-13 PROCEDURE — 99284 EMERGENCY DEPT VISIT MOD MDM: CPT

## 2023-02-13 PROCEDURE — 93971 EXTREMITY STUDY: CPT

## 2023-02-13 PROCEDURE — 73502 X-RAY EXAM HIP UNI 2-3 VIEWS: CPT

## 2023-02-13 PROCEDURE — 96372 THER/PROPH/DIAG INJ SC/IM: CPT

## 2023-02-13 RX ORDER — OXYCODONE HYDROCHLORIDE AND ACETAMINOPHEN 5; 325 MG/1; MG/1
1 TABLET ORAL EVERY 8 HOURS PRN
COMMUNITY

## 2023-02-13 RX ORDER — KETOROLAC TROMETHAMINE 15 MG/ML
15 INJECTION, SOLUTION INTRAMUSCULAR; INTRAVENOUS
Status: COMPLETED | OUTPATIENT
Start: 2023-02-13 | End: 2023-02-13

## 2023-02-13 RX ADMIN — KETOROLAC TROMETHAMINE 15 MG: 15 INJECTION, SOLUTION INTRAMUSCULAR; INTRAVENOUS at 17:39

## 2023-02-13 ASSESSMENT — ENCOUNTER SYMPTOMS
EYE PAIN: 0
EYE REDNESS: 0
COUGH: 0
NAUSEA: 0
BACK PAIN: 0
VOMITING: 0
ABDOMINAL PAIN: 0
DIARRHEA: 0
TROUBLE SWALLOWING: 0
SHORTNESS OF BREATH: 0

## 2023-02-13 ASSESSMENT — PAIN DESCRIPTION - LOCATION
LOCATION: LEG

## 2023-02-13 ASSESSMENT — PAIN DESCRIPTION - DESCRIPTORS
DESCRIPTORS: ACHING
DESCRIPTORS: ACHING;NAGGING
DESCRIPTORS: ACHING

## 2023-02-13 ASSESSMENT — PAIN DESCRIPTION - FREQUENCY: FREQUENCY: CONTINUOUS

## 2023-02-13 ASSESSMENT — PAIN DESCRIPTION - ORIENTATION
ORIENTATION: LEFT
ORIENTATION: LEFT;POSTERIOR
ORIENTATION: LEFT

## 2023-02-13 ASSESSMENT — PAIN - FUNCTIONAL ASSESSMENT
PAIN_FUNCTIONAL_ASSESSMENT: 0-10
PAIN_FUNCTIONAL_ASSESSMENT: 0-10

## 2023-02-13 ASSESSMENT — PAIN DESCRIPTION - ONSET: ONSET: SUDDEN

## 2023-02-13 ASSESSMENT — PAIN SCALES - GENERAL
PAINLEVEL_OUTOF10: 8
PAINLEVEL_OUTOF10: 7
PAINLEVEL_OUTOF10: 4

## 2023-02-13 ASSESSMENT — PAIN DESCRIPTION - PAIN TYPE: TYPE: CHRONIC PAIN
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
## 2023-02-13 NOTE — ED TRIAGE NOTES
Left leg pain from hip to lower leg x3 weeks, worst in left popliteal area.  Denies Hx of DVT, chronic back pain, sciatica and is currently in pain management

## 2023-02-14 ENCOUNTER — OFFICE VISIT (OUTPATIENT)
Age: 63
End: 2023-02-14

## 2023-02-14 VITALS — TEMPERATURE: 98 F | BODY MASS INDEX: 34.33 KG/M2 | HEIGHT: 64 IN

## 2023-02-14 DIAGNOSIS — M75.112 NONTRAUMATIC INCOMPLETE TEAR OF LEFT ROTATOR CUFF: Primary | ICD-10-CM

## 2023-02-14 LAB — ECHO BSA: 2.02 M2

## 2023-02-14 PROCEDURE — 99024 POSTOP FOLLOW-UP VISIT: CPT | Performed by: PHYSICIAN ASSISTANT

## 2023-02-14 NOTE — PROGRESS NOTES
Jona CARLOS Gann  1960     HISTORY OF PRESENT ILLNESS  Jacob Gann is a 58 y.o. female who presents today for evaluation s/p  s/p Left shoulder arthroscopic removal of hardware and biceps tenotomy on 11/14/22. Patient has been holding on PT as advised  at last OV. Describes pain as a 7/10. She states in last few weeks the pain is more persistent. Was prescribed Medrol Dosepak at last OV. States she has pain in the inside of her shoulder. Reports no change in symptoms since last OV. She has pain with  laying down and even while at rest. Is unable to lay on her left shoulder. Patient denies any fever, chills, chest pain, shortness of breath or calf pain. The remainder of the review of systems is negative. There are no new illness or injuries to report  since last seen in the office. No changes in medications, allergies, social or family history. PHYSICAL EXAM:   Temp 98 °F (36.7 °C) (Temporal)   Ht 5' 4\" (1.626 m)   BMI 34.33 kg/m²    The patient is a well-developed, well-nourished female in no acute distress. The patient is alert and oriented times three. The patient appears to be well groomed. Mood and affect are normal.  ORTHOPEDIC EXAM of left shoulder:     Inspection: swelling present,  Bruising not present   Incisions well healed  Passive glenohumeral abduction 0-90 degrees  Stability: Stable  Strength: 4/5   2+ distal pulses    IMAGING: MRI of left shoulder read and reviewed by myself reveal:      Post rotator cuff repair with marked thickening/tendinosis of the rotator cuff,   otherwise grossly intact. Moderate subacromial/subdeltoid bursitis. IMPRESSION:  S/P left shoulder arthroscopic removal of hardware and biceps tenotomy    PLAN:   Patient status post left shoulder arthroscopic removal of hardware and biceps tenotomy. There is no evidence of rotator cuff retear on mri. No surgical indications at this time. We will get patient back into physical therapy.     RTC 6 weeks    Eugenio King [facility]

## 2023-02-14 NOTE — ED NOTES
Discharge instructions reviewed with patient. Patient verbalized understanding. Patient advised to follow up as directed on discharge instructions. Patient denies questions, needs or concerns at this time. Patient verbalized understanding. No s/sx of distress noted.         Agueda Bartlett RN  02/13/23 1917

## 2023-02-21 ENCOUNTER — TELEPHONE (OUTPATIENT)
Age: 63
End: 2023-02-21

## 2023-02-21 DIAGNOSIS — M75.112 NONTRAUMATIC INCOMPLETE TEAR OF LEFT ROTATOR CUFF: Primary | ICD-10-CM

## 2023-02-21 NOTE — TELEPHONE ENCOUNTER
Patient was asked to contact us for a new referral for occupational therapy as they cannot schedule her at \[facility]\"" - their occupational therapist has relocated.

## 2023-03-02 ENCOUNTER — HOSPITAL ENCOUNTER (OUTPATIENT)
Facility: HOSPITAL | Age: 63
Setting detail: RECURRING SERIES
Discharge: HOME | End: 2023-03-05
Payer: MEDICARE

## 2023-03-02 PROCEDURE — 97110 THERAPEUTIC EXERCISES: CPT

## 2023-03-02 PROCEDURE — 97162 PT EVAL MOD COMPLEX 30 MIN: CPT

## 2023-03-02 NOTE — PROGRESS NOTES
[address] [place], [address] DQ:[phone] Fx: [phone]  Plan of Care / Statement of Necessity for Physical Therapy Services       Patient name: David Morales Start of Care: 3/2/2023   Referral source: Brenda Stephens [place] : 1960    Medical Diagnosis: Nontraumatic incomplete tear of left rotator cuff [M75.112]                                   Onset Date: 2022, 2022    Treatment Diagnosis:   left shoulder pain                 Prior Hospitalization: see medical history Provider#: [id number]   Medications: Verified on Patient Summary List   Insurance: Payor: MEDICARE / Plan: MEDICARE PART A AND B / Product Type: *No Product type* /       Comorbidities: arthritis, asthma, Back pain, BMI over 30, Depression, Hearing impairment, HTN, OP, Prior surgery, Sleep dysfunction, right RTC repair-6-7 years ago, visual impaired  Prior Level of Function: Patient is left hand dominant. Patient stated she lives alone and is independent with food preparation and household management. The Plan of Care and following information is based on the information from the initial evaluation. Assessment / key information: Patient presents with left shoulder pain S/P left RTC repair 2022 and then  s/p Left shoulder arthroscopic removal of hardware and biceps tenotomy on 22. Patient stated pain has been better since the hardware removal in November, but still has consistent pain. Patient describes left shoulder pain as constant, but intensity level varies. Patient has increased pain with sleeping (waking 6-10x/night), occasional pain with reaching overhead, pain with writing a prolonged period of time,  cooking/chopping, and lifting. Patient reports pain will also radiate down left UE, in particular notices pain in left forearm and wrist. Patient denies numbness/tingling.   Patient exhibits decreased left shoulder/UE strength, mild limitation into left shoulder flexion AROM and IR behind back, decreased left UE musculature endurance, and postural dysfunction. Patient demonstrates potential to make functional gains within a reasonable time frame. Patient would benefit from skilled PT to address above deficits and assist with return to PLOF. Evaluation Complexity:  History:  MEDIUM  Complexity : 1-2 comorbidities / personal factors will impact the outcome/ POC ; Examination:  MEDIUM Complexity : 3 Standardized tests and measures addressin body structure, function, activity limitation and / or participation in recreation  ;Presentation:  MEDIUM Complexity : Evolving with changing characteristics  ; Clinical Decision Making:  MEDIUM Complexity : FOTO score of 26-74 FOTO score = an established functional score where 100 = no disability  Overall Complexity Rating: MEDIUM  Problem List: pain affecting function, decrease ROM, decrease strength, decrease ADL/functional abilities, decrease activity tolerance, decrease flexibility/joint mobility, and decrease transfer abilities    Treatment Plan may include any combination of the followin Therapeutic Exercise, 39529 Neuromuscular Re-Education, 15944 Manual Therapy, 86299 Therapeutic Activity, and 51167 Self Care/Home Management  Patient / Family readiness to learn indicated by: asking questions, trying to perform skills, return verbalization , and return demonstration   Persons(s) to be included in education: patient (P)  Barriers to Learning/Limitations: none  Measures taken if barriers to learning present: none   Patient Goal (s): \"to be pain free\"   Patient Self Reported Health Status: good  Rehabilitation Potential: good    Short Term Goals: To be accomplished in 2 weeks  Patient will subjectively report independence with HEP to increase ease with ADLs. Eval: HEP established  2.  Patient will improve left shoulder AROM flexion to 140 deg with pain no more then 3/10 to increase ease with overhead activities.   Eval: Left shoulder AROM flexion: 132 deg (6/10 pain level)   Long Term Goals: To be accomplished in 4 weeks  Patient will improve FOTO by 5 points to demonstrate improved function.  Eval: FOTO: 58  2. Patient will improve left shoulder Flexion and scaption strength to 4-/5 to increase ease with household management  tasks.   Eval: Left shoulder strength: flexion: 3+/5 Scaption: 3+/5  3. Patient will report only waking 4x/night due to pain to assist with return to PLOF.   Eval:  Patient has increased pain with sleeping (waking 6-10x/night),    Frequency / Duration: Patient to be seen 2 times per week for 4 weeks  Goals will be assigned and reassessed every 10 visits/ 30 days per guidelines .    Patient/ Caregiver education and instruction: Diagnosis, prognosis, exercises [x]  Plan of care has been reviewed with PTA    Certification Period: 3/2/23- 05/31/23    hKadijah Casey, PT       3/2/2023       2:59 PM  ===================================================================  I certify that the above Therapy Services are being furnished while the patient is under my care. I agree with the treatment plan and certify that this therapy is necessary.    Physician's Signature:_________________________   DATE:_________   TIME:________                           Brenda Argueta PA    ** Signature, Date and Time must be completed for valid certification **  Please sign and [facility] or you may fax the signed copy to [phone]. Thank you.

## 2023-03-02 NOTE — PROGRESS NOTES
PT DAILY TREATMENT NOTE/SHOULDER EVAL     Patient Name: Edwina Ordaz    Date: 3/2/2023    : 1960  Insurance: Payor: MEDICARE / Plan: MEDICARE PART A AND B / Product Type: *No Product type* /      Patient  verified yes     Visit #   Current / Total 1 8   Time   In / Out 1:41 2:28   Pain   In / Out 7 6   Subjective Functional Status/Changes: See below   Changes to:  Meds, Allergies, Med Hx, Sx Hx? If yes, update Summary List no         Treatment Area: Nontraumatic incomplete tear of left rotator cuff [M75.112]    SUBJECTIVE  Pain Level (0-10 scale): 7/10   [x]constant []intermittent []improving []worsening []no change since onset    Any medication changes, allergies to medications, adverse drug reactions, diagnosis change, or new procedure performed?: [x] No    [] Yes (see summary sheet for update)  Subjective functional status/changes:     PLOF: Patient is left hand dominant. Patient lives alone and is independent with food preparation and household management. Limitations to PLOF: Patient exhibits decreased left shoulder/UE strength, mild limitation into left shoulder flexion AROM and IR behind back, decreased left UE musculature endurance, and postural dysfunction. Patient demonstrates potential to make functional gains within a reasonable time frame. Patient would benefit from skilled PT to address above deficits and assist with return to PLOF. Mechanism of Injury: Patient presents with left shoulder pain S/P left RTC repair 2022 and then  s/p Left shoulder arthroscopic removal of hardware and biceps tenotomy on 22. Patient stated pain has been better since the hardware removal in November, but still has consistent pain. Patient has increased pain with sleeping (waking 6-10x/night), occasional pain with reaching overhead, pain with writing a prolonged period of time, cooking/chopping, and lifting.    Current symptoms/Complaints: Patient describes left shoulder pain as constant, but intensity level varies. Patient reports pain will also radiate down left UE, in particular notices pain in forearm and wrist. Patient denies numbness/tingling. Previous Treatment/Compliance: prior PT   PMHx/Surgical Hx: right RTC repair-6-7 years ago; No previous neck//UE surg. No pacemaker, no cardiac issues, and no cancer. Work Hx:   Living Situation:   Pt Goals: \"to be pain free\"   Cognition: A & O x 2    Other:    OBJECTIVE/EXAMINATION      32 min [x]Eval                  []Re-Eval       Therapeutic Procedures: Tx Min Billable or 1:1 Min (if diff from Tx Min) Procedure, Rationale, Specifics   15 15 [id number] Therapeutic Exercise (timed):  increase ROM, strength, coordination, balance, and proprioception to improve patient's ability to progress to PLOF and address remaining functional goals. (see flow sheet as applicable)     Details if applicable:            Details if applicable:            Details if applicable:            Details if applicable:            Details if applicable: Total  15 Total  15 Reminder: [facility] bill using total billable min of TIMED therapeutic procedures (example: do not include dry needle or estim unattended, both untimed codes, in totals to left)     [x]  Patient Education billed concurrently with other procedures     Other Objective/Functional Measures:     Physical Therapy Evaluation - Shoulder    Posture: [] Poor    [x] Fair    [] Good    Describe: fwd head, rounded shoulders, increased kyphosis.      ROM:  [] Unable to assess at this time                                           AROM                                                              PROM   Left Right  Left Right   Flexion 132 (6/10P!) 148 Flexion WNL    Extension   Extension     Scaption/ (6/10P!) 160 Scaptin/ABD WNL    ER @ 0 Degrees   ER @ 0 Degrees     ER @ 90 Degrees   ER @ 90 Degrees     IR @ 90 Degrees   IR @ 90 Degrees       End Feel / Painful Arc:    Strength:   [] Unable to assess at this time                                                                            L (1-5) R (1-5) Pain   Flexors 3+ 4 [] Yes   [] No   Abductors 3+ 4 [] Yes   [] No   External Rotators (neutral)  4- 4 [] Yes   [] No   Internal Rotators  (neutral)  4- 4 [] Yes   [] No   Supraspinatus   [] Yes   [] No   Serratus Anterior   [] Yes   [] No   Lower Trapezius   [] Yes   [] No   Elbow Flexion 4  [] Yes   [] No   Elbow Extension 4  [] Yes   [] No       Scapulohumoral Control / Rhythm:  Able to eccentrically lower with good control? Left: [] Yes   [] No     Right: [] Yes   [] No    Accessory Motions:    Palpation  [] Min  [x] Mod  [] Severe    Location: TTP at left UT/levator scap and at anterior shoulder     Other Tests / Comments:        Pain Level (0-10 scale) post treatment: 6/10     ASSESSMENT/Changes in Function: See POC. Advised patient to stop if pain increases. Patient will continue to benefit from skilled PT services to modify and progress therapeutic interventions, analyze and address functional mobility deficits, analyze and address ROM deficits, analyze and address strength deficits, analyze and address soft tissue restrictions, analyze and cue for proper movement patterns, and analyze and modify for postural abnormalities to address functional deficits and attain remaining goals.      [x]  See POC for goals and assessment     PLAN  []  Upgrade activities as tolerated     [x]  Continue plan of care  []  Update interventions per flow sheet       []  Other:_      Valentino Guthrie, PT 3/2/2023  1:45 PM

## 2023-03-06 ENCOUNTER — HOSPITAL ENCOUNTER (OUTPATIENT)
Facility: HOSPITAL | Age: 63
Setting detail: RECURRING SERIES
Discharge: HOME | End: 2023-03-09
Payer: MEDICARE

## 2023-03-06 PROCEDURE — 97112 NEUROMUSCULAR REEDUCATION: CPT

## 2023-03-06 PROCEDURE — 97110 THERAPEUTIC EXERCISES: CPT

## 2023-03-06 NOTE — PROGRESS NOTES
PHYSICAL / OCCUPATIONAL THERAPY - DAILY TREATMENT NOTE (updated )    Patient Name: Tabatha Almeida    Date: 3/6/2023    : 1960  Insurance: Payor: MEDICARE / Plan: MEDICARE PART A AND B / Product Type: *No Product type* /      Patient  verified Yes     Visit #   Current / Total 2 8   Time   In / Out 9:30 10:16   Pain   In / Out 6 7-8   Subjective Functional Status/Changes: Pt reports shoulder is sore   Changes to:  Meds, Allergies, Med Hx, Sx Hx? If yes, update Summary List no       TREATMENT AREA =  Nontraumatic incomplete tear of left rotator cuff [M75.112]    OBJECTIVE    Modalities Rationale:     decrease pain to improve patient's ability to progress to PLOF and address remaining functional goals. min [] Estim Unattended, type/location:                                      []  w/ice    []  w/heat    min [] Estim Attended, type/location:                                     []  w/US     []  w/ice    []  w/heat    []  TENS insruct      min []  Mechanical Traction: type/lbs                   []  pro   []  sup   []  int   []  cont    []  before manual    []  after manual    min []  Ultrasound, settings/location:      min []  Iontophoresis w/ dexamethasone, location:                                               []  take home patch       []  in clinic     10   min  unbilled []  Ice     [x]  Heat    location/position: Seated, left shoulder    min []  Paraffin,  details:     min []  Vasopneumatic Device, press/temp:     min []  Kristopher Aneesh / Walda Dancer: If using vaso (only need to measure limb vaso being performed on)      pre-treatment girth :       post-treatment girth :       measured at (landmark location) :      min []  Other:    Skin assessment post-treatment (if applicable):    []  intact    []  redness- no adverse reaction                 []redness - adverse reaction:         Therapeutic Procedures:   Tx Min Billable or 1:1 Min (if diff from Tx Min) Procedure, Rationale, Specifics   26 16 [id number] Therapeutic Exercise (timed):  increase ROM, strength, coordination, balance, and proprioception to improve patient's ability to progress to PLOF and address remaining functional goals. (see flow sheet as applicable)     Details if applicable:       10 10 53781 Neuromuscular Re-Education (timed):  improve balance, coordination, kinesthetic sense, posture, core stability and proprioception to improve patient's ability to develop conscious control of individual muscles and awareness of position of extremities in order to progress to PLOF and address remaining functional goals. (see flow sheet as applicable)     Details if applicable:     39 26 [facility] Totals Reminder: bill using total billable min of TIMED therapeutic procedures (example: do not include dry needle or estim unattended, both untimed codes, in totals to left)  8-22 min = 1 unit; 23-37 min = 2 units; 38-52 min = 3 units; 53-67 min = 4 units; 68-82 min = 5 units   Total Total     TOTAL TREATMENT TIME:        36     [x]  Patient Education billed concurrently with other procedures   [x] Review HEP    [] Progressed/Changed HEP, detail:    [] Other detail:       Objective Information/Functional Measures/Assessment  First f/u after Eval    Initiated treatment program per flow sheet. Pt reports compliance with HEP. Pt demo's good left shoulder AROM and is able to perform therex although pt reports 7-8/10 pain. Patient will continue to benefit from skilled PT / OT services to modify and progress therapeutic interventions, analyze and address functional mobility deficits, analyze and address ROM deficits, analyze and address strength deficits, analyze and address soft tissue restrictions, analyze and cue for proper movement patterns, and analyze and modify for postural abnormalities to address functional deficits and attain remaining goals. Progress toward goals / Updated goals:  []  See Progress Note/Recertification    Short Term Goals:  To be accomplished in 2 weeks  Patient will subjectively report independence with HEP to increase ease with ADLs. Eval: HEP established  Current: Met, pt reports compliance 3//6/3023  2. Patient will improve left shoulder AROM flexion to 140 deg with pain no more then 3/10 to increase ease with overhead activities. Eval: Left shoulder AROM flexion: 132 deg (6/10 pain level)   Long Term Goals: To be accomplished in 4 weeks  Patient will improve FOTO by 5 points to demonstrate improved function. Eval: FOTO: 58  2. Patient will improve left shoulder Flexion and scaption strength to 4-/5 to increase ease with household management  tasks. Eval: Left shoulder strength: flexion: 3+/5 Scaption: 3+/5  3. Patient will report only waking 4x/night due to pain to assist with return to PLOF.               Eval:  Patient has increased pain with sleeping (waking 6-10x/night),       PLAN  Yes  Continue plan of care  []  Upgrade activities as tolerated  []  Discharge due to :  []  Other:    Dixie Trevino PTA    3/6/2023    7:59 AM    Future Appointments   Date Time Provider Kevin Leo   3/6/2023  9:30 AM Dixie Trevino PTA [facility] [place]   3/8/2023 10:00 AM [facility] [place]   3/13/2023  9:30 AM [facility] [place]   3/15/2023  9:30 AM Darek Olson PT [facility] [place]   3/28/2023 11:15 AM CHRISTY Ram [facility]

## 2023-03-08 ENCOUNTER — HOSPITAL ENCOUNTER (OUTPATIENT)
Facility: HOSPITAL | Age: 63
Setting detail: RECURRING SERIES
Discharge: HOME | End: 2023-03-11
Payer: MEDICARE

## 2023-03-08 PROCEDURE — 97110 THERAPEUTIC EXERCISES: CPT

## 2023-03-08 NOTE — PROGRESS NOTES
PHYSICAL / OCCUPATIONAL THERAPY - DAILY TREATMENT NOTE (updated )    Patient Name: Cristiane Dennis    Date: 3/8/2023    : 1960  Insurance: Payor: MEDICARE / Plan: MEDICARE PART A AND B / Product Type: *No Product type* /      Patient  verified Yes     Visit #   Current / Total 3 8   Time   In / Out 10:04 10:50   Pain   In / Out 7 7   Subjective Functional Status/Changes: Pt reports shoulder has been sore since last visit   Changes to:  Meds, Allergies, Med Hx, Sx Hx? If yes, update Summary List no       TREATMENT AREA =  Nontraumatic incomplete tear of left rotator cuff [M75.112]    OBJECTIVE    Modalities Rationale:     decrease pain to improve patient's ability to progress to PLOF and address remaining functional goals. min [] Estim Unattended, type/location:                                      []  w/ice    []  w/heat    min [] Estim Attended, type/location:                                     []  w/US     []  w/ice    []  w/heat    []  TENS insruct      min []  Mechanical Traction: type/lbs                   []  pro   []  sup   []  int   []  cont    []  before manual    []  after manual    min []  Ultrasound, settings/location:      min []  Iontophoresis w/ dexamethasone, location:                                               []  take home patch       []  in clinic     10   min  unbilled []  Ice     [x]  Heat    location/position:  Seated, left shoulder    min []  Paraffin,  details:     min []  Vasopneumatic Device, press/temp:     min []  Cathren Gasmen / Che Southward: If using vaso (only need to measure limb vaso being performed on)      pre-treatment girth :       post-treatment girth :       measured at (landmark location) :      min []  Other:    Skin assessment post-treatment (if applicable):    []  intact    []  redness- no adverse reaction                 []redness - adverse reaction:         Therapeutic Procedures:   Tx Min Billable or 1:1 Min (if diff from [employer]) Procedure, Rationale, Specifics   36  [id number] Therapeutic Exercise (timed):  increase ROM, strength, coordination, balance, and proprioception to improve patient's ability to progress to PLOF and address remaining functional goals. (see flow sheet as applicable)     Details if applicable:       39  [facility] Totals Reminder: bill using total billable min of TIMED therapeutic procedures (example: do not include dry needle or estim unattended, both untimed codes, in totals to left)  8-22 min = 1 unit; 23-37 min = 2 units; 38-52 min = 3 units; 53-67 min = 4 units; 68-82 min = 5 units   Total Total     TOTAL TREATMENT TIME:        36     [x]  Patient Education billed concurrently with other procedures   [x] Review HEP    [] Progressed/Changed HEP, detail:    [] Other detail:       Objective Information/Functional Measures/Assessment  AROM shoulder flex 135*    Cont's to be challenged with left shoulder flex and reports 7/10 pain throughout treatment. Pt also reports intermittent tingling into forearm and fingers. Patient will continue to benefit from skilled PT / OT services to modify and progress therapeutic interventions, analyze and address functional mobility deficits, analyze and address ROM deficits, analyze and address strength deficits, analyze and address soft tissue restrictions, analyze and cue for proper movement patterns, and analyze and modify for postural abnormalities to address functional deficits and attain remaining goals. Progress toward goals / Updated goals:  []  See Progress Note/Recertification    Short Term Goals: To be accomplished in 2 weeks  Patient will subjectively report independence with HEP to increase ease with ADLs. Eval: HEP established  Current: Met, pt reports compliance 3//6/3023  2. Patient will improve left shoulder AROM flexion to 140 deg with pain no more then 3/10 to increase ease with overhead activities.               Eval: Left shoulder AROM flexion: 132 deg (6/10 pain level) Current: Progressing, 135* with pain 3/8/2023  Long Term Goals: To be accomplished in 4 weeks  Patient will improve FOTO by 5 points to demonstrate improved function. Eval: FOTO: 58  2. Patient will improve left shoulder Flexion and scaption strength to 4-/5 to increase ease with household management  tasks. Eval: Left shoulder strength: flexion: 3+/5 Scaption: 3+/5  3. Patient will report only waking 4x/night due to pain to assist with return to PLOF.               Eval:  Patient has increased pain with sleeping (waking 6-10x/night),    PLAN  Yes  Continue plan of care  []  Upgrade activities as tolerated  []  Discharge due to :  []  Other:    Swetha Bettencourt PTA    3/8/2023    8:47 AM    Future Appointments   Date Time Provider Kevin Leo   3/8/2023 10:00 AM Swetha Bettencourt PTA [facility] [place]   3/13/2023  9:30 AM Trina Magallon PTA [facility] [place]   3/15/2023  9:30 AM Tamra Posada PT [facility] [place]   3/28/2023 11:15 AM CHRISTY Zhou [facility]

## 2023-03-13 ENCOUNTER — HOSPITAL ENCOUNTER (OUTPATIENT)
Facility: HOSPITAL | Age: 63
Setting detail: RECURRING SERIES
Discharge: HOME | End: 2023-03-16
Payer: MEDICARE

## 2023-03-13 PROCEDURE — 97110 THERAPEUTIC EXERCISES: CPT

## 2023-03-13 NOTE — PROGRESS NOTES
PHYSICAL / OCCUPATIONAL THERAPY - DAILY TREATMENT NOTE (updated )    Patient Name: Jona Gann    Date: 3/13/2023    : 1960  Insurance: Payor: MEDICARE / Plan: MEDICARE PART A AND B / Product Type: *No Product type* /      Patient  verified Yes     Visit #   Current / Total 4 8   Time   In / Out 9:30 10:15   Pain   In / Out 6 7   Subjective Functional Status/Changes: \"Doing ok\"   Changes to:  Meds, Allergies, Med Hx, Sx Hx?  If yes, update Summary List no       TREATMENT AREA =  Nontraumatic incomplete tear of left rotator cuff [M75.112]    OBJECTIVE    Modalities Rationale:     decrease pain to improve patient's ability to progress to PLOF and address remaining functional goals.     min [] Estim Unattended, type/location:                                      []  w/ice    []  w/heat    min [] Estim Attended, type/location:                                     []  w/US     []  w/ice    []  w/heat    []  TENS insruct      min []  Mechanical Traction: type/lbs                   []  pro   []  sup   []  int   []  cont    []  before manual    []  after manual    min []  Ultrasound, settings/location:      min []  Iontophoresis w/ dexamethasone, location:                                               []  take home patch       []  in clinic     10   min  unbilled []  Ice     [x]  Heat    location/position: Seated, left shoulder    min []  Paraffin,  details:     min []  Vasopneumatic Device, press/temp:     min []  [employer] / Fluido:    If using vaso (only need to measure limb vaso being performed on)      pre-treatment girth :       post-treatment girth :       measured at (landmark location) :      min []  Other:    Skin assessment post-treatment (if applicable):    []  intact    []  redness- no adverse reaction                 []redness - adverse reaction:         Therapeutic Procedures:  Tx Min Billable or 1:1 Min (if diff from Tx Min) Procedure, Rationale, Specifics   35  [id number] Therapeutic  Exercise (timed):  increase ROM, strength, coordination, balance, and proprioception to improve patient's ability to progress to PLOF and address remaining functional goals. (see flow sheet as applicable)     Details if applicable:       28  [facility] Totals Reminder: bill using total billable min of TIMED therapeutic procedures (example: do not include dry needle or estim unattended, both untimed codes, in totals to left)  8-22 min = 1 unit; 23-37 min = 2 units; 38-52 min = 3 units; 53-67 min = 4 units; 68-82 min = 5 units   Total Total     TOTAL TREATMENT TIME:        35     [x]  Patient Education billed concurrently with other procedures   [x] Review HEP    [] Progressed/Changed HEP, detail:    [] Other detail:       Objective Information/Functional Measures/Assessment    Added supine tband Y, T, and benson ER. Pt c/o left shoulder pain and tingling into benson hands throughout treatment. Left shoulder cont's to fatigue easily but demo's good ROM with pulley. Patient will continue to benefit from skilled PT / OT services to modify and progress therapeutic interventions, analyze and address functional mobility deficits, analyze and address ROM deficits, analyze and address strength deficits, analyze and address soft tissue restrictions, analyze and cue for proper movement patterns, and analyze and modify for postural abnormalities to address functional deficits and attain remaining goals. Progress toward goals / Updated goals:  []  See Progress Note/Recertification    Short Term Goals: To be accomplished in 2 weeks  Patient will subjectively report independence with HEP to increase ease with ADLs. Eval: HEP established  Current: Met, pt reports compliance 3//6/3023  2. Patient will improve left shoulder AROM flexion to 140 deg with pain no more then 3/10 to increase ease with overhead activities.               Eval: Left shoulder AROM flexion: 132 deg (6/10 pain level)               Current: Progressing, 135* with pain 3/8/2023  Long Term Goals: To be accomplished in 4 weeks  Patient will improve FOTO by 5 points to demonstrate improved function. Eval: FOTO: 58  2. Patient will improve left shoulder Flexion and scaption strength to 4-/5 to increase ease with household management  tasks. Eval: Left shoulder strength: flexion: 3+/5 Scaption: 3+/5  3. Patient will report only waking 4x/night due to pain to assist with return to PLOF.               Eval:  Patient has increased pain with sleeping (waking 6-10x/night),    PLAN  Yes  Continue plan of care  []  Upgrade activities as tolerated  []  Discharge due to :  []  Other:    Yaquelin Salinasp, PTA    3/13/2023    9:43 AM    Future Appointments   Date Time Provider Kevin Leo   3/15/2023  9:30 AM [facility]   3/28/2023 11:15 AM CHRISTY Sutton [facility] AM[facility]

## 2023-03-15 ENCOUNTER — HOSPITAL ENCOUNTER (OUTPATIENT)
Facility: HOSPITAL | Age: 63
Setting detail: RECURRING SERIES
Discharge: HOME | End: 2023-03-18
Payer: MEDICARE

## 2023-03-15 PROCEDURE — 97112 NEUROMUSCULAR REEDUCATION: CPT

## 2023-03-15 PROCEDURE — 97110 THERAPEUTIC EXERCISES: CPT

## 2023-03-15 NOTE — PROGRESS NOTES
PHYSICAL / OCCUPATIONAL THERAPY - DAILY TREATMENT NOTE (updated )    Patient Name: Kitty Gonzalez    Date: 3/15/2023    : 1960  Insurance: Payor: MEDICARE / Plan: MEDICARE PART A AND B / Product Type: *No Product type* /      Patient  verified Yes     Visit #   Current / Total 5 8   Time   In / Out 9:32 10:02   Pain   In / Out 6/10 6/10    Subjective Functional Status/Changes: Patient reports no change in pain as of yet. Changes to:  Meds, Allergies, Med Hx, Sx Hx? If yes, update Summary List no       TREATMENT AREA =  Nontraumatic incomplete tear of left rotator cuff [M75.112]    OBJECTIVE    Therapeutic Procedures: Tx Min Billable or 1:1 Min (if diff from Tx Min) Procedure, Rationale, Specifics   22  03565 Therapeutic Exercise (timed):  increase ROM, strength, coordination, balance, and proprioception to improve patient's ability to progress to PLOF and address remaining functional goals. (see flow sheet as applicable)     Details if applicable:       8  68260 Neuromuscular Re-Education (timed):  improve balance, coordination, kinesthetic sense, posture, core stability and proprioception to improve patient's ability to develop conscious control of individual muscles and awareness of position of extremities in order to progress to PLOF and address remaining functional goals.  (see flow sheet as applicable)     Details if applicable:            Details if applicable:            Details if applicable:            Details if applicable:     30 22 [facility] Totals Reminder: bill using total billable min of TIMED therapeutic procedures (example: do not include dry needle or estim unattended, both untimed codes, in totals to left)  8-22 min = 1 unit; 23-37 min = 2 units; 38-52 min = 3 units; 53-67 min = 4 units; 68-82 min = 5 units   Total Total     TOTAL TREATMENT TIME:        30     [x]  Patient Education billed concurrently with other procedures   [x] Review HEP    [] Progressed/Changed HEP, detail:    [] Other detail:       Objective Information/Functional Measures/Assessment    Therapist provided cues for correct technique and muscle activation with exercises. Increased hold time at 90deg of flexion and scaption and patient was challenged but was able to do it. Patient reported no change in pain at end of the session. Patient will continue to benefit from skilled PT / OT services to modify and progress therapeutic interventions, analyze and address functional mobility deficits, analyze and address ROM deficits, analyze and address strength deficits, analyze and address soft tissue restrictions, analyze and cue for proper movement patterns, and analyze and modify for postural abnormalities to address functional deficits and attain remaining goals. Progress toward goals / Updated goals:  []  See Progress Note/Recertification       Short Term Goals: To be accomplished in 2 weeks  Patient will subjectively report independence with HEP to increase ease with ADLs. Eval: HEP established  Current: Met, pt reports compliance 3//6/3023  2. Patient will improve left shoulder AROM flexion to 140 deg with pain no more then 3/10 to increase ease with overhead activities. Eval: Left shoulder AROM flexion: 132 deg (6/10 pain level)               Current: Progressing, 135* with pain 3/8/2023  Long Term Goals: To be accomplished in 4 weeks  Patient will improve FOTO by 5 points to demonstrate improved function. Eval: FOTO: 58  2. Patient will improve left shoulder Flexion and scaption strength to 4-/5 to increase ease with household management  tasks. Eval: Left shoulder strength: flexion: 3+/5 Scaption: 3+/5  3. Patient will report only waking 4x/night due to pain to assist with return to PLOF.               Eval:  Patient has increased pain with sleeping (waking 6-10x/night),   Current: Patient reports no change (3/15/23)        PLAN  Yes  Continue plan of care  []  Upgrade activities as tolerated  []  Discharge due to :  []  Other:    Montey Moritz, PT    3/15/2023    7:13 AM    Future Appointments   Date Time Provider Kevin Felipa   3/15/2023  9:30 AM Montey Moritz, PT [facility]   3/20/2023 10:30 AM Rekha Bain PTA [facility]   3/21/2023  9:30 AM Jameson Becker PT [facility]   3/28/2023 11:15 AM CHRISTY [facility]   3/28/2023 12:30 PM Laurent Vera, BETTIE [facility]   3/30/2023  9:30 AM BETTIE Kim

## 2023-03-20 ENCOUNTER — HOSPITAL ENCOUNTER (OUTPATIENT)
Facility: HOSPITAL | Age: 63
Setting detail: RECURRING SERIES
Discharge: HOME | End: 2023-03-23
Payer: MEDICARE

## 2023-03-20 PROCEDURE — 97112 NEUROMUSCULAR REEDUCATION: CPT

## 2023-03-20 PROCEDURE — 97110 THERAPEUTIC EXERCISES: CPT

## 2023-03-20 NOTE — PROGRESS NOTES
PHYSICAL / OCCUPATIONAL THERAPY - DAILY TREATMENT NOTE (updated )    Patient Name: Gisell Rolling    Date: 3/20/2023    : 1960  Insurance: Payor: MEDICARE / Plan: MEDICARE PART A AND B / Product Type: *No Product type* /      Patient  verified Yes     Visit #   Current / Total 6 8   Time   In / Out 10:32 11:06   Pain   In / Out 6 6   Subjective Functional Status/Changes: \"I still have pain at night\"   Changes to:  Meds, Allergies, Med Hx, Sx Hx? If yes, update Summary List no       TREATMENT AREA =  Nontraumatic incomplete tear of left rotator cuff [M75.112]    OBJECTIVE      Therapeutic Procedures: Tx Min Billable or 1:1 Min (if diff from Tx Min) Procedure, Rationale, Specifics   24 17 24126 Therapeutic Exercise (timed):  increase ROM, strength, coordination, balance, and proprioception to improve patient's ability to progress to PLOF and address remaining functional goals. (see flow sheet as applicable)     Details if applicable:       10 10 43566 Neuromuscular Re-Education (timed):  improve balance, coordination, kinesthetic sense, posture, core stability and proprioception to improve patient's ability to develop conscious control of individual muscles and awareness of position of extremities in order to progress to PLOF and address remaining functional goals.  (see flow sheet as applicable)     Details if applicable:     34 32 [facility] Totals Reminder: bill using total billable min of TIMED therapeutic procedures (example: do not include dry needle or estim unattended, both untimed codes, in totals to left)  8-22 min = 1 unit; 23-37 min = 2 units; 38-52 min = 3 units; 53-67 min = 4 units; 68-82 min = 5 units   Total Total     TOTAL TREATMENT TIME:        34     [x]  Patient Education billed concurrently with other procedures   [x] Review HEP    [] Progressed/Changed HEP, detail:    [] Other detail:       Objective Information/Functional Measures/Assessment    Demo's improved AROM shoulder

## 2023-03-21 ENCOUNTER — HOSPITAL ENCOUNTER (OUTPATIENT)
Facility: HOSPITAL | Age: 63
Setting detail: RECURRING SERIES
Discharge: HOME | End: 2023-03-24
Payer: MEDICARE

## 2023-03-21 PROCEDURE — 97112 NEUROMUSCULAR REEDUCATION: CPT

## 2023-03-21 PROCEDURE — 97110 THERAPEUTIC EXERCISES: CPT

## 2023-03-21 NOTE — PROGRESS NOTES
Information/Functional Measures/Assessment    Patient reporting majority of pain in anterior aspect of shoulder. Added pec stretch and 1/2 prone series to address anterior flexibility and periscapular strength. Otherwise showing full AROM with all exercises. Patient will continue to benefit from skilled PT / OT services to modify and progress therapeutic interventions, analyze and address functional mobility deficits, analyze and address ROM deficits, analyze and address strength deficits, and analyze and cue for proper movement patterns to address functional deficits and attain remaining goals. Progress toward goals / Updated goals:  []  See Progress Note/Recertification    Short Term Goals: To be accomplished in 2 weeks  Patient will subjectively report independence with HEP to increase ease with ADLs. Eval: HEP established  Current: Met, pt reports compliance 3//6/3023  2. Patient will improve left shoulder AROM flexion to 140 deg with pain no more then 3/10 to increase ease with overhead activities. Eval: Left shoulder AROM flexion: 132 deg (6/10 pain level)               Current: Progressing, 135* with pain 3/8/2023; Met, AROM flex 160* 3/20/2023  Long Term Goals: To be accomplished in 4 weeks  Patient will improve FOTO by 5 points to demonstrate improved function. Eval: FOTO: 58  2. Patient will improve left shoulder Flexion and scaption strength to 4-/5 to increase ease with household management  tasks. Eval: Left shoulder strength: flexion: 3+/5 Scaption: 3+/5  3. Patient will report only waking 4x/night due to pain to assist with return to PLOF.               Eval:  Patient has increased pain with sleeping (waking 6-10x/night),              Current: Patient reports no change (3/15/23)       PLAN  Yes  Continue plan of care  []  Upgrade activities as tolerated  []  Discharge due to :  []  Other:    Lucas Lobo, PT    3/21/2023    9:27 AM    Future Appointments   Date

## 2023-03-28 ENCOUNTER — HOSPITAL ENCOUNTER (OUTPATIENT)
Facility: HOSPITAL | Age: 63
Setting detail: RECURRING SERIES
Discharge: HOME | End: 2023-03-31
Payer: MEDICARE

## 2023-03-28 ENCOUNTER — OFFICE VISIT (OUTPATIENT)
Age: 63
End: 2023-03-28
Payer: MEDICARE

## 2023-03-28 DIAGNOSIS — M75.112 NONTRAUMATIC INCOMPLETE TEAR OF LEFT ROTATOR CUFF: Primary | ICD-10-CM

## 2023-03-28 PROCEDURE — 97110 THERAPEUTIC EXERCISES: CPT

## 2023-03-28 PROCEDURE — 99024 POSTOP FOLLOW-UP VISIT: CPT | Performed by: PHYSICIAN ASSISTANT

## 2023-03-28 PROCEDURE — 20611 DRAIN/INJ JOINT/BURSA W/US: CPT | Performed by: PHYSICIAN ASSISTANT

## 2023-03-28 RX ORDER — TRIAMCINOLONE ACETONIDE 40 MG/ML
40 INJECTION, SUSPENSION INTRA-ARTICULAR; INTRAMUSCULAR ONCE
Status: COMPLETED | OUTPATIENT
Start: 2023-03-28 | End: 2023-03-28

## 2023-03-28 RX ADMIN — TRIAMCINOLONE ACETONIDE 40 MG: 40 INJECTION, SUSPENSION INTRA-ARTICULAR; INTRAMUSCULAR at 11:22

## 2023-03-28 NOTE — PROGRESS NOTES
- [facility]  OFFICE PROCEDURE NOTE        Chart reviewed for the following:  Guevara RODRIGEZ PA, have reviewed the History, Physical and updated the Allergic reactions for Jona Gann     TIME OUT performed immediately prior to start of procedure:  Guevara RODRIGEZ PA-C, have performed the following reviews on Jona Gann prior to the start of the procedure:            * Patient was identified by name and date of birth   * Agreement on procedure being performed was verified  * Risks and Benefits explained to the patient  * Procedure site verified and marked as necessary  * Patient was positioned for comfort  * Consent was signed and verified     Time: 11:44 AM    Date of procedure: 3/28/2023    Procedure performed by:  CHRISTY Talamantes    Provider assisted by: (see medication administration)    How tolerated by patient: tolerated the procedure well with no complications    Comments: none      IMPRESSION:  S/P left shoulder arthroscopic removal of hardware and biceps tenotomy    PLAN:   Patient status post left shoulder arthroscopic removal of hardware and biceps tenotomy. There is no evidence of rotator cuff retear on mri. No surgical indications at this time. She will continue with physical therapy and we will inject intrabursal a with cortisone today given her significant pain.     RTC 6 weeks    EDOUARD Talamantes [facility]

## 2023-03-30 ENCOUNTER — HOSPITAL ENCOUNTER (OUTPATIENT)
Facility: HOSPITAL | Age: 63
Setting detail: RECURRING SERIES
End: 2023-03-30
Payer: MEDICARE

## 2023-03-30 PROCEDURE — 97110 THERAPEUTIC EXERCISES: CPT

## 2023-03-30 PROCEDURE — 97112 NEUROMUSCULAR REEDUCATION: CPT

## 2023-03-30 NOTE — PROGRESS NOTES
Progressed/Changed HEP, detail:    [] Other detail:       Objective Information/Functional Measures/Assessment  The pt has shown fair progression with treatment to the left shoulder displaying improved ROM and improving strength. The pt has reached her FOTO goal and notes increased ease with reaching overhead into cabinets. The pt does continue to display high pain levels in the left shoulder and notes a continued challenge with sleeping at night due to pain. Left shoulder ABD ROM and strength deficits remain as well. Pt is to benefit from continued treatment to strengthen the right shoulder and improve functional mobility to aid with ease of overhead reaching ADL'. s      Patient will continue to benefit from skilled PT / OT services to modify and progress therapeutic interventions, analyze and address functional mobility deficits, analyze and address ROM deficits, analyze and address strength deficits, analyze and address soft tissue restrictions, analyze and cue for proper movement patterns, and analyze and modify for postural abnormalities to address functional deficits and attain remaining goals. Progress toward goals / Updated goals:  [x]  See Progress Note/Recertification  Patient will subjectively report independence with HEP to increase ease with ADLs. Eval: HEP established  Current: Met, pt reports compliance 3//6/3023  2. Patient will improve left shoulder AROM flexion to 140 deg with pain no more then 3/10 to increase ease with overhead activities. Eval: Left shoulder AROM flexion: 132 deg (6/10 pain level)               Current: Met, AROM flex 160* 3/20/2023  Long Term Goals: To be accomplished in 4 weeks  Patient will improve FOTO by 5 points to demonstrate improved function. Eval: FOTO: 58  Current: Met 3/30/23 FOTO score 65%  2. Patient will improve left shoulder Flexion and scaption strength to 4-/5 to increase ease with household management  tasks.               Eval: Left shoulder strength: flexion: 3+/5 Scaption: 3+/5   Current: progressing 3/30/23Flexion 4/5, scap 4-/5  3. Patient will report only waking 4x/night due to pain to assist with return to PLOF.               Eval:  Patient has increased pain with sleeping (waking 6-10x/night),              Current: Patient reports no change (3/15/23)       PLAN  Yes  Continue plan of care  []  Upgrade activities as tolerated  []  Discharge due to :  []  Other:    Chencho Love PTA    3/30/2023    10:20 AM    Future Appointments   Date Time Provider Kevin Leo   4/25/2023  9:00 AM CHRISTY López [facility]

## 2023-03-30 NOTE — PROGRESS NOTES
treatment for the day and informed the patient that she needed to wait about 24 hours before return to treatment after a Cortizone shot, so the medicine administered can fully absorb into the treated area. The pt understood and agreed. Resume therex as prescribed NV. Patient will continue to benefit from skilled PT / OT services to modify and progress therapeutic interventions, analyze and address functional mobility deficits, analyze and address ROM deficits, analyze and address strength deficits, analyze and address soft tissue restrictions, analyze and cue for proper movement patterns, and analyze and modify for postural abnormalities to address functional deficits and attain remaining goals. Progress toward goals / Updated goals:  [x]  See Progress Note/Recertification  Short Term Goals: To be accomplished in 2 weeks  Patient will subjectively report independence with HEP to increase ease with ADLs. Eval: HEP established  Current: Met, pt reports compliance 3//6/3023  2. Patient will improve left shoulder AROM flexion to 140 deg with pain no more then 3/10 to increase ease with overhead activities. Eval: Left shoulder AROM flexion: 132 deg (6/10 pain level)               Current: Progressing, 135* with pain 3/8/2023; Met, AROM flex 160* 3/20/2023  Long Term Goals: To be accomplished in 4 weeks  Patient will improve FOTO by 5 points to demonstrate improved function. Eval: FOTO: 58  2. Patient will improve left shoulder Flexion and scaption strength to 4-/5 to increase ease with household management  tasks. Eval: Left shoulder strength: flexion: 3+/5 Scaption: 3+/5  3. Patient will report only waking 4x/night due to pain to assist with return to PLOF.               Eval:  Patient has increased pain with sleeping (waking 6-10x/night),              Current: Patient reports no change (3/15/23)       PLAN  Yes  Continue plan of care  []  Upgrade activities as tolerated  []  Discharge
mobility to aid with ease of overhead reaching ADL'. s    New Certification Period: 3/30/2023-6/28/23      Evie Pride PTA 3/30/2023 12:58 PM    ________________________________________________________________________  I certify that the above Therapy Services are being furnished while the patient is under my care. I agree with the treatment plan and certify that this therapy is necessary. [] I have read the above and request that my patient continue as recommended.   [] I have read the above report and request that my patient continue therapy with the following changes/special instructions: _______________________________________  [] I have read the above report and request that my patient be discharged from therapy    Physician's Signature:____________________ Date:_________ TIME:________    Fern Anis, Date and Time must be completed for valid certification **      Please sign and return to [facility] [address] [place] [address] & [facility]  [address] [place] [address], [facility] [address]  [phone] fax

## 2023-04-05 ENCOUNTER — HOSPITAL ENCOUNTER (OUTPATIENT)
Facility: HOSPITAL | Age: 63
Setting detail: RECURRING SERIES
Discharge: HOME | End: 2023-04-08
Payer: MEDICARE

## 2023-04-05 PROCEDURE — 97112 NEUROMUSCULAR REEDUCATION: CPT

## 2023-04-05 PROCEDURE — 97110 THERAPEUTIC EXERCISES: CPT

## 2023-04-05 NOTE — PROGRESS NOTES
Time Provider [facility]   4/6/2023 11:00 AM Tee Brooks, [facility]   4/18/2023  9:10 AM Tee Brooks, [facility]   4/20/2023  9:10 AM Merlyn Holder, [facility]   4/24/2023  9:50 AM Tee Brooks, [facility]   4/25/2023  9:00 AM CHRISTY Duong [facility]   4/26/2023  9:50 AM Merlyn Holder, BETTIE Anthony

## 2023-04-14 ENCOUNTER — HOSPITAL ENCOUNTER (OUTPATIENT)
Facility: HOSPITAL | Age: 63
Setting detail: RECURRING SERIES
Discharge: HOME | End: 2023-04-17
Payer: MEDICARE

## 2023-04-14 PROCEDURE — 97110 THERAPEUTIC EXERCISES: CPT

## 2023-04-14 PROCEDURE — 97112 NEUROMUSCULAR REEDUCATION: CPT

## 2023-04-18 ENCOUNTER — HOSPITAL ENCOUNTER (OUTPATIENT)
Facility: HOSPITAL | Age: 63
Setting detail: RECURRING SERIES
Discharge: HOME | End: 2023-04-21
Payer: MEDICARE

## 2023-04-18 PROCEDURE — 97110 THERAPEUTIC EXERCISES: CPT

## 2023-04-18 PROCEDURE — 97112 NEUROMUSCULAR REEDUCATION: CPT

## 2023-04-18 NOTE — PROGRESS NOTES
PHYSICAL / OCCUPATIONAL THERAPY - DAILY TREATMENT NOTE (updated )    Patient Name: Tyler Fox    Date: 2023    : 1960  Insurance: Payor: MEDICARE / Plan: MEDICARE PART A AND B / Product Type: *No Product type* /      Patient  verified Yes     Visit #   Current / Total 14 16   Time   In / Out 9:10 9:48   Pain   In / Out 6 6   Subjective Functional Status/Changes: Pt reports her shoulder is doing well but she has pain when she is trying to go to sleep at night. Changes to:  Meds, Allergies, Med Hx, Sx Hx? If yes, update Summary List no       TREATMENT AREA =  Nontraumatic incomplete tear of left rotator cuff [M75.112]    OBJECTIVE    Therapeutic Procedures: Tx Min Billable or 1:1 Min (if diff from Tx Min) Procedure, Rationale, Specifics   28  73001 Therapeutic Exercise (timed):  increase ROM, strength, coordination, balance, and proprioception to improve patient's ability to progress to PLOF and address remaining functional goals. (see flow sheet as applicable)     Details if applicable:       10  47972 Neuromuscular Re-Education (timed):  improve balance, coordination, kinesthetic sense, posture, core stability and proprioception to improve patient's ability to develop conscious control of individual muscles and awareness of position of extremities in order to progress to PLOF and address remaining functional goals.  (see flow sheet as applicable)     Details if applicable:     45  [facility] Totals Reminder: bill using total billable min of TIMED therapeutic procedures (example: do not include dry needle or estim unattended, both untimed codes, in totals to left)  8-22 min = 1 unit; 23-37 min = 2 units; 38-52 min = 3 units; 53-67 min = 4 units; 68-82 min = 5 units   Total Total     TOTAL TREATMENT TIME:        38     [x]  Patient Education billed concurrently with other procedures   [x] Review HEP    [] Progressed/Changed HEP, detail:    [] Other detail:       Objective

## 2023-04-19 ENCOUNTER — HOSPITAL ENCOUNTER (OUTPATIENT)
Facility: HOSPITAL | Age: 63
Setting detail: RECURRING SERIES
Discharge: HOME | End: 2023-04-22
Payer: MEDICARE

## 2023-04-19 PROCEDURE — 97110 THERAPEUTIC EXERCISES: CPT

## 2023-04-19 PROCEDURE — 97535 SELF CARE MNGMENT TRAINING: CPT

## 2023-04-19 PROCEDURE — 97112 NEUROMUSCULAR REEDUCATION: CPT

## 2023-04-19 NOTE — PROGRESS NOTES
left)  8-22 min = 1 unit; 23-37 min = 2 units; 38-52 min = 3 units; 53-67 min = 4 units; 68-82 min = 5 units   Total Total     TOTAL TREATMENT TIME:        52     [x]  Patient Education billed concurrently with other procedures   [x] Review HEP    [] Progressed/Changed HEP, detail:    [] Other detail:       FOTO score: 75%    Objective Information/Functional Measures/Assessment  The pt has shown fair improvement with treatment to the left shoulder since [facility]. The pt displays improve ROM, reports improved functional mobility and was able to meet her strength goals. The pt does continue to report pain in the left shoulder with sleeping at night, noting minimal change. The pt reports she will be going out of town for a while and request to be discharged today. Pt discharged upon request.      Patient will continue to benefit from skilled PT / OT services to modify and progress therapeutic interventions, analyze and address functional mobility deficits, analyze and address ROM deficits, analyze and address strength deficits, analyze and address soft tissue restrictions, analyze and cue for proper movement patterns, and analyze and modify for postural abnormalities to address functional deficits and attain remaining goals. Progress toward goals / Updated goals:  [x]  See Progress Note/Recertification  1. Patient will improve left shoulder Flexion and scaption strength to 4+/5 to increase ease with household management  tasks. RECERT: progressing Flexion 4/5, scap 4-/5  Current: Flexion: 4+/5, Scaption: 4+/5 (4/14/23) met   2. Patient will report only waking 4x/night due to pain to assist with return to PLOF. RECERT: Patient reports no change increased pain with sleeping (waking 6-10x/night)  Current: Pt reports no change, continued pain/difficulty sleeping (4/14/23)   3. Patient will report no difficulty with reaching an overhead shelf to aid with ease of daily chores.    RE-CERT: little difficulty (via

## 2023-04-19 NOTE — PROGRESS NOTES
pt does continue to report pain in the left shoulder with sleeping at night, noting minimal change. The pt reports she will be going out of town for a while and request to be discharged today.  Pt discharged upon request.    RECOMMENDATIONS:  [x]Discontinue therapy: []Patient has reached or is progressing toward set goals      []Patient is non-compliant or has abdicated      [x]Due to lack of appreciable progress towards set goals    Bert Caldwell, BETTIE 4/19/2023 5:39 PM

## 2023-04-20 ENCOUNTER — APPOINTMENT (OUTPATIENT)
Facility: HOSPITAL | Age: 63
End: 2023-04-20
Payer: MEDICARE

## 2023-04-24 ENCOUNTER — APPOINTMENT (OUTPATIENT)
Facility: HOSPITAL | Age: 63
End: 2023-04-24
Payer: MEDICARE

## 2023-04-26 ENCOUNTER — APPOINTMENT (OUTPATIENT)
Facility: HOSPITAL | Age: 63
End: 2023-04-26
Payer: MEDICARE

## 2023-05-08 NOTE — PROGRESS NOTES
Jona CARLOS Gann  1960     HISTORY OF PRESENT ILLNESS  Gera Gann is a 58 y.o. female who presents today for evaluation s/p  s/p Left shoulder arthroscopic removal of hardware and biceps tenotomy on 11/14/22. Patient has finished her formal therapy and had a cortisone injection at last visit. Feels like rom is better but is still in pain. Describes pain as a 6/10. She states in last few weeks the pain is more persistent. She notes some new radiating pain down her arm. Rom of neck exacerbates the pain. She has pain with  laying down and even while at rest. Is unable to lay on her left shoulder. Patient denies any fever, chills, chest pain, shortness of breath or calf pain. The remainder of the review of systems is negative. There are no new illness or injuries to report  since last seen in the office. No changes in medications, allergies, social or family history. PHYSICAL EXAM:   Ht 5' 4\" (1.626 m)   Wt 199 lb (90.3 kg)   BMI 34.16 kg/m²    The patient is a well-developed, well-nourished female in no acute distress. The patient is alert and oriented times three. The patient appears to be well groomed. Mood and affect are normal.  ORTHOPEDIC EXAM of left shoulder:     Inspection: swelling present,  Bruising not present   Incisions well healed  Passive glenohumeral abduction 0-90 degrees  Stability: Stable  Strength: 4/5   2+ distal pulses    Examination Neck   Skin Intact   Tenderness, Paracervical +   Paracervical spasms  +   Flexion Decreased 25%   Extension Decreased 25%   Lateral bend left Normal   Lateral bend right Normal   Masses -   Spurling sign -   Biceps reflex Normal   Triceps reflex Normal   Brachioradialis reflex Normal   Sensation Normal         IMAGING: MRI of left shoulder read and reviewed by myself reveal:      Post rotator cuff repair with marked thickening/tendinosis of the rotator cuff,   otherwise grossly intact. Moderate subacromial/subdeltoid bursitis.

## 2023-05-09 ENCOUNTER — OFFICE VISIT (OUTPATIENT)
Age: 63
End: 2023-05-09
Payer: MEDICARE

## 2023-05-09 VITALS — HEIGHT: 64 IN | BODY MASS INDEX: 33.97 KG/M2 | WEIGHT: 199 LBS

## 2023-05-09 DIAGNOSIS — M79.2 RADICULAR PAIN IN LEFT ARM: ICD-10-CM

## 2023-05-09 DIAGNOSIS — M75.112 NONTRAUMATIC INCOMPLETE TEAR OF LEFT ROTATOR CUFF: Primary | ICD-10-CM

## 2023-05-09 PROCEDURE — G8427 DOCREV CUR MEDS BY ELIG CLIN: HCPCS | Performed by: PHYSICIAN ASSISTANT

## 2023-05-09 PROCEDURE — 3017F COLORECTAL CA SCREEN DOC REV: CPT | Performed by: PHYSICIAN ASSISTANT

## 2023-05-09 PROCEDURE — G8417 CALC BMI ABV UP PARAM F/U: HCPCS | Performed by: PHYSICIAN ASSISTANT

## 2023-05-09 PROCEDURE — 1036F TOBACCO NON-USER: CPT | Performed by: PHYSICIAN ASSISTANT

## 2023-05-09 PROCEDURE — 99214 OFFICE O/P EST MOD 30 MIN: CPT | Performed by: PHYSICIAN ASSISTANT

## 2023-05-09 RX ORDER — METHYLPREDNISOLONE 4 MG/1
TABLET ORAL
Qty: 1 KIT | Refills: 0 | Status: SHIPPED | OUTPATIENT
Start: 2023-05-09 | End: 2023-05-15

## 2023-05-30 ENCOUNTER — OFFICE VISIT (OUTPATIENT)
Age: 63
End: 2023-05-30
Payer: MEDICARE

## 2023-05-30 VITALS — BODY MASS INDEX: 34.66 KG/M2 | HEIGHT: 64 IN | TEMPERATURE: 96.4 F | WEIGHT: 203 LBS

## 2023-05-30 DIAGNOSIS — M79.2 RADICULAR PAIN IN LEFT ARM: Primary | ICD-10-CM

## 2023-05-30 DIAGNOSIS — M75.112 NONTRAUMATIC INCOMPLETE TEAR OF LEFT ROTATOR CUFF: ICD-10-CM

## 2023-05-30 PROCEDURE — G8427 DOCREV CUR MEDS BY ELIG CLIN: HCPCS | Performed by: ORTHOPAEDIC SURGERY

## 2023-05-30 PROCEDURE — 1036F TOBACCO NON-USER: CPT | Performed by: ORTHOPAEDIC SURGERY

## 2023-05-30 PROCEDURE — G8417 CALC BMI ABV UP PARAM F/U: HCPCS | Performed by: ORTHOPAEDIC SURGERY

## 2023-05-30 PROCEDURE — 3017F COLORECTAL CA SCREEN DOC REV: CPT | Performed by: ORTHOPAEDIC SURGERY

## 2023-05-30 PROCEDURE — 99213 OFFICE O/P EST LOW 20 MIN: CPT | Performed by: ORTHOPAEDIC SURGERY

## 2023-05-30 NOTE — PROGRESS NOTES
Jona Gann  1960     HISTORY OF PRESENT ILLNESS  Lizette Gann is a 58 y.o. female who presents today for reevaluation s/p  s/p Left shoulder arthroscopic removal of hardware and biceps tenotomy on 11/14/22. Patient rates pain as 7/10 today. Patient has finished her formal therapy and had a cortisone injection at office visit with CHRISTY Ramos on 3/28/2023. Feels like rom is better but is still in pain. She states in last few weeks the pain is more persistent. She notes some new radiating pain down her arm. Rom of neck exacerbates the pain. She has pain with  laying down and even while at rest. Is unable to lay on her left shoulder. At last OV with CHRISTY Ramos on 5/09/2023, patient was prescribed Medrol dosepak. The Medrol dosepak did not provide relief for her radicular symptoms. Notes hx of surgery on her lower lumbar spine years ago. She has pain with laying on her left side. Has minimal pain with movement of the arm. Can also experience shoulder blade pain. Patient denies any fever, chills, chest pain, shortness of breath or calf pain. The remainder of the review of systems is negative. There are no new illness or injuries to report  since last seen in the office. No changes in medications, allergies, social or family history. PHYSICAL EXAM:   Temp (!) 96.4 °F (35.8 °C) (Temporal)   Ht 5' 4\" (1.626 m)   Wt 203 lb (92.1 kg)   BMI 34.84 kg/m²    The patient is a well-developed, well-nourished female in no acute distress. The patient is alert and oriented times three. The patient appears to be well groomed.  Mood and affect are normal.  ORTHOPEDIC EXAM of left shoulder:     Inspection: swelling present,  Bruising not present   Incisions well healed  Passive glenohumeral abduction 0-90 degrees  Stability: Stable  Strength: 4/5   2+ distal pulses    Examination Neck   Skin Intact   Tenderness, Paracervical +   Paracervical spasms  +   Flexion Decreased 25%   Extension

## 2023-08-14 ENCOUNTER — OFFICE VISIT (OUTPATIENT)
Age: 63
End: 2023-08-14
Payer: MEDICARE

## 2023-08-14 VITALS — HEIGHT: 64 IN | BODY MASS INDEX: 34.66 KG/M2 | WEIGHT: 203 LBS

## 2023-08-14 DIAGNOSIS — M79.2 RADICULAR PAIN IN LEFT ARM: Primary | ICD-10-CM

## 2023-08-14 DIAGNOSIS — M75.102 TEAR OF LEFT ROTATOR CUFF, UNSPECIFIED TEAR EXTENT, UNSPECIFIED WHETHER TRAUMATIC: ICD-10-CM

## 2023-08-14 PROCEDURE — 99213 OFFICE O/P EST LOW 20 MIN: CPT | Performed by: ORTHOPAEDIC SURGERY

## 2023-08-14 PROCEDURE — 1036F TOBACCO NON-USER: CPT | Performed by: ORTHOPAEDIC SURGERY

## 2023-08-14 PROCEDURE — G8427 DOCREV CUR MEDS BY ELIG CLIN: HCPCS | Performed by: ORTHOPAEDIC SURGERY

## 2023-08-14 PROCEDURE — G8417 CALC BMI ABV UP PARAM F/U: HCPCS | Performed by: ORTHOPAEDIC SURGERY

## 2023-08-14 PROCEDURE — 3017F COLORECTAL CA SCREEN DOC REV: CPT | Performed by: ORTHOPAEDIC SURGERY

## 2023-09-05 ENCOUNTER — TRANSCRIBE ORDERS (OUTPATIENT)
Facility: HOSPITAL | Age: 63
End: 2023-09-05

## 2023-09-05 DIAGNOSIS — Z12.31 SCREENING MAMMOGRAM FOR HIGH-RISK PATIENT: Primary | ICD-10-CM

## 2023-11-15 RX ORDER — BACLOFEN 10 MG/1
10 TABLET ORAL 3 TIMES DAILY
Qty: 90 TABLET | Refills: 1 | OUTPATIENT
Start: 2023-11-15

## 2023-12-07 ENCOUNTER — HOSPITAL ENCOUNTER (OUTPATIENT)
Facility: HOSPITAL | Age: 63
Discharge: HOME | End: 2023-12-07
Attending: ORTHOPAEDIC SURGERY
Payer: MEDICARE

## 2023-12-07 VITALS — WEIGHT: 190 LBS | BODY MASS INDEX: 32.44 KG/M2 | HEIGHT: 64 IN

## 2023-12-07 DIAGNOSIS — Z12.31 SCREENING MAMMOGRAM FOR HIGH-RISK PATIENT: ICD-10-CM

## 2023-12-07 PROCEDURE — 77063 BREAST TOMOSYNTHESIS BI: CPT

## 2023-12-16 ENCOUNTER — HOSPITAL ENCOUNTER (OUTPATIENT)
Facility: HOSPITAL | Age: 63
End: 2023-12-16
Attending: ORTHOPAEDIC SURGERY
Payer: MEDICARE

## 2023-12-16 DIAGNOSIS — M79.2 RADICULAR PAIN IN LEFT ARM: ICD-10-CM

## 2023-12-16 DIAGNOSIS — M75.102 TEAR OF LEFT ROTATOR CUFF, UNSPECIFIED TEAR EXTENT, UNSPECIFIED WHETHER TRAUMATIC: ICD-10-CM

## 2023-12-16 PROCEDURE — 72141 MRI NECK SPINE W/O DYE: CPT

## 2023-12-16 PROCEDURE — 73221 MRI JOINT UPR EXTREM W/O DYE: CPT

## 2024-01-18 ENCOUNTER — OFFICE VISIT (OUTPATIENT)
Age: 64
End: 2024-01-18
Payer: MEDICARE

## 2024-01-18 DIAGNOSIS — M54.12 RADICULOPATHY, CERVICAL REGION: ICD-10-CM

## 2024-01-18 DIAGNOSIS — M79.2 RADICULAR PAIN IN LEFT ARM: Primary | ICD-10-CM

## 2024-01-18 PROCEDURE — G8417 CALC BMI ABV UP PARAM F/U: HCPCS | Performed by: ORTHOPAEDIC SURGERY

## 2024-01-18 PROCEDURE — 99213 OFFICE O/P EST LOW 20 MIN: CPT | Performed by: ORTHOPAEDIC SURGERY

## 2024-01-18 PROCEDURE — G8427 DOCREV CUR MEDS BY ELIG CLIN: HCPCS | Performed by: ORTHOPAEDIC SURGERY

## 2024-01-18 PROCEDURE — G8484 FLU IMMUNIZE NO ADMIN: HCPCS | Performed by: ORTHOPAEDIC SURGERY

## 2024-01-18 PROCEDURE — 3017F COLORECTAL CA SCREEN DOC REV: CPT | Performed by: ORTHOPAEDIC SURGERY

## 2024-01-18 PROCEDURE — 1036F TOBACCO NON-USER: CPT | Performed by: ORTHOPAEDIC SURGERY

## 2024-01-18 NOTE — PROGRESS NOTES
today  4. No diagnostic test indicated today:   5. No durable medical equipment indicated today  6. Yes referral indicated today Dr. Johnston  7. No medications indicated today:   8. No Narcotic indicated today.       RTC PRN       Scribed by Hussain Herrera) as dictated by Hermes Briceno MD    I, Dr. Hermes Briceno, confirm that all documentation is accurate.    Hermes Briceno M.D.   [facility]

## 2024-03-25 ENCOUNTER — HOSPITAL ENCOUNTER (OUTPATIENT)
Facility: HOSPITAL | Age: 64
Discharge: HOME | End: 2024-03-28
Attending: ORTHOPAEDIC SURGERY
Payer: MEDICARE

## 2024-03-25 DIAGNOSIS — M54.50 LOW BACK PAIN, UNSPECIFIED BACK PAIN LATERALITY, UNSPECIFIED CHRONICITY, UNSPECIFIED WHETHER SCIATICA PRESENT: ICD-10-CM

## 2024-03-25 DIAGNOSIS — M96.1 POST LAMINECTOMY SYNDROME: ICD-10-CM

## 2024-03-25 PROCEDURE — 72148 MRI LUMBAR SPINE W/O DYE: CPT

## 2024-03-25 PROCEDURE — 72110 X-RAY EXAM L-2 SPINE 4/>VWS: CPT

## 2024-07-19 ENCOUNTER — HOSPITAL ENCOUNTER (OUTPATIENT)
Facility: HOSPITAL | Age: 64
Discharge: HOME | End: 2024-07-22
Payer: MEDICARE

## 2024-07-19 ENCOUNTER — HOSPITAL ENCOUNTER (OUTPATIENT)
Facility: HOSPITAL | Age: 64
End: 2024-07-19
Payer: MEDICARE

## 2024-07-19 LAB
ALBUMIN SERPL-MCNC: 3.8 G/DL (ref 3.4–5)
ALBUMIN/GLOB SERPL: 1.1 (ref 0.8–1.7)
ALP SERPL-CCNC: 146 U/L (ref 45–117)
ALT SERPL-CCNC: 28 U/L (ref 13–56)
ANION GAP SERPL CALC-SCNC: 6 MMOL/L (ref 3–18)
AST SERPL-CCNC: 24 U/L (ref 10–38)
BASOPHILS # BLD: 0 K/UL (ref 0–0.1)
BASOPHILS NFR BLD: 1 % (ref 0–2)
BILIRUB SERPL-MCNC: 0.4 MG/DL (ref 0.2–1)
BUN SERPL-MCNC: 15 MG/DL (ref 7–18)
BUN/CREAT SERPL: 8 (ref 12–20)
CALCIUM SERPL-MCNC: 9.3 MG/DL (ref 8.5–10.1)
CHLORIDE SERPL-SCNC: 112 MMOL/L (ref 100–111)
CO2 SERPL-SCNC: 23 MMOL/L (ref 21–32)
CREAT SERPL-MCNC: 1.8 MG/DL (ref 0.6–1.3)
DIFFERENTIAL METHOD BLD: ABNORMAL
EOSINOPHIL # BLD: 0.1 K/UL (ref 0–0.4)
EOSINOPHIL NFR BLD: 1 % (ref 0–5)
ERYTHROCYTE [DISTWIDTH] IN BLOOD BY AUTOMATED COUNT: 13.9 % (ref 11.6–14.5)
GLOBULIN SER CALC-MCNC: 3.5 G/DL (ref 2–4)
GLUCOSE SERPL-MCNC: 84 MG/DL (ref 74–99)
HCT VFR BLD AUTO: 34.5 % (ref 35–45)
HGB BLD-MCNC: 10.8 G/DL (ref 12–16)
IMM GRANULOCYTES # BLD AUTO: 0 K/UL (ref 0–0.04)
IMM GRANULOCYTES NFR BLD AUTO: 0 % (ref 0–0.5)
LYMPHOCYTES # BLD: 1.6 K/UL (ref 0.9–3.6)
LYMPHOCYTES NFR BLD: 27 % (ref 21–52)
MCH RBC QN AUTO: 30.3 PG (ref 24–34)
MCHC RBC AUTO-ENTMCNC: 31.3 G/DL (ref 31–37)
MCV RBC AUTO: 96.9 FL (ref 78–100)
MONOCYTES # BLD: 0.4 K/UL (ref 0.05–1.2)
MONOCYTES NFR BLD: 6 % (ref 3–10)
NEUTS SEG # BLD: 4 K/UL (ref 1.8–8)
NEUTS SEG NFR BLD: 66 % (ref 40–73)
NRBC # BLD: 0 K/UL (ref 0–0.01)
NRBC BLD-RTO: 0 PER 100 WBC
PLATELET # BLD AUTO: 227 K/UL (ref 135–420)
PMV BLD AUTO: 12.7 FL (ref 9.2–11.8)
POTASSIUM SERPL-SCNC: 4.2 MMOL/L (ref 3.5–5.5)
PROT SERPL-MCNC: 7.3 G/DL (ref 6.4–8.2)
RBC # BLD AUTO: 3.56 M/UL (ref 4.2–5.3)
SODIUM SERPL-SCNC: 141 MMOL/L (ref 136–145)
WBC # BLD AUTO: 6.1 K/UL (ref 4.6–13.2)

## 2024-07-19 PROCEDURE — 93005 ELECTROCARDIOGRAM TRACING: CPT | Performed by: ORTHOPAEDIC SURGERY

## 2024-07-19 PROCEDURE — 80053 COMPREHEN METABOLIC PANEL: CPT

## 2024-07-19 PROCEDURE — 85025 COMPLETE CBC W/AUTO DIFF WBC: CPT

## 2024-07-19 PROCEDURE — 36415 COLL VENOUS BLD VENIPUNCTURE: CPT

## 2024-07-21 LAB
EKG ATRIAL RATE: 77 BPM
EKG DIAGNOSIS: NORMAL
EKG P AXIS: 61 DEGREES
EKG P-R INTERVAL: 112 MS
EKG Q-T INTERVAL: 404 MS
EKG QRS DURATION: 70 MS
EKG QTC CALCULATION (BAZETT): 457 MS
EKG R AXIS: 17 DEGREES
EKG T AXIS: 60 DEGREES
EKG VENTRICULAR RATE: 77 BPM

## 2024-07-21 PROCEDURE — 93010 ELECTROCARDIOGRAM REPORT: CPT | Performed by: INTERNAL MEDICINE

## 2024-08-30 ENCOUNTER — HOSPITAL ENCOUNTER (OUTPATIENT)
Facility: HOSPITAL | Age: 64
End: 2024-08-30
Payer: MEDICARE

## 2024-08-30 DIAGNOSIS — M25.561 RIGHT KNEE PAIN, UNSPECIFIED CHRONICITY: ICD-10-CM

## 2024-08-30 DIAGNOSIS — Z01.812 BLOOD TESTS PRIOR TO TREATMENT OR PROCEDURE: ICD-10-CM

## 2024-08-30 LAB
ALBUMIN SERPL-MCNC: 3.4 G/DL (ref 3.4–5)
ALBUMIN/GLOB SERPL: 1.1 (ref 0.8–1.7)
ALP SERPL-CCNC: 119 U/L (ref 45–117)
ALT SERPL-CCNC: 22 U/L (ref 13–56)
ANION GAP SERPL CALC-SCNC: 5 MMOL/L (ref 3–18)
AST SERPL-CCNC: 26 U/L (ref 10–38)
BASOPHILS # BLD: 0.1 K/UL (ref 0–0.1)
BASOPHILS NFR BLD: 1 % (ref 0–2)
BILIRUB SERPL-MCNC: 0.8 MG/DL (ref 0.2–1)
BUN SERPL-MCNC: 15 MG/DL (ref 7–18)
BUN/CREAT SERPL: 12 (ref 12–20)
CALCIUM SERPL-MCNC: 8.9 MG/DL (ref 8.5–10.1)
CHLORIDE SERPL-SCNC: 111 MMOL/L (ref 100–111)
CO2 SERPL-SCNC: 26 MMOL/L (ref 21–32)
CREAT SERPL-MCNC: 1.21 MG/DL (ref 0.6–1.3)
DIFFERENTIAL METHOD BLD: ABNORMAL
EOSINOPHIL # BLD: 0.2 K/UL (ref 0–0.4)
EOSINOPHIL NFR BLD: 3 % (ref 0–5)
ERYTHROCYTE [DISTWIDTH] IN BLOOD BY AUTOMATED COUNT: 13.1 % (ref 11.6–14.5)
GLOBULIN SER CALC-MCNC: 3.2 G/DL (ref 2–4)
GLUCOSE SERPL-MCNC: 84 MG/DL (ref 74–99)
HCT VFR BLD AUTO: 33.4 % (ref 35–45)
HGB BLD-MCNC: 10.4 G/DL (ref 12–16)
IMM GRANULOCYTES # BLD AUTO: 0 K/UL (ref 0–0.04)
IMM GRANULOCYTES NFR BLD AUTO: 0 % (ref 0–0.5)
LYMPHOCYTES # BLD: 1.7 K/UL (ref 0.9–3.6)
LYMPHOCYTES NFR BLD: 28 % (ref 21–52)
MCH RBC QN AUTO: 30.1 PG (ref 24–34)
MCHC RBC AUTO-ENTMCNC: 31.1 G/DL (ref 31–37)
MCV RBC AUTO: 96.5 FL (ref 78–100)
MONOCYTES # BLD: 0.5 K/UL (ref 0.05–1.2)
MONOCYTES NFR BLD: 8 % (ref 3–10)
NEUTS SEG # BLD: 3.6 K/UL (ref 1.8–8)
NEUTS SEG NFR BLD: 61 % (ref 40–73)
NRBC # BLD: 0 K/UL (ref 0–0.01)
NRBC BLD-RTO: 0 PER 100 WBC
PLATELET # BLD AUTO: 294 K/UL (ref 135–420)
PMV BLD AUTO: 12.6 FL (ref 9.2–11.8)
POTASSIUM SERPL-SCNC: 4.6 MMOL/L (ref 3.5–5.5)
PROT SERPL-MCNC: 6.6 G/DL (ref 6.4–8.2)
RBC # BLD AUTO: 3.46 M/UL (ref 4.2–5.3)
SODIUM SERPL-SCNC: 142 MMOL/L (ref 136–145)
WBC # BLD AUTO: 6 K/UL (ref 4.6–13.2)

## 2024-08-30 PROCEDURE — 80053 COMPREHEN METABOLIC PANEL: CPT

## 2024-08-30 PROCEDURE — 36415 COLL VENOUS BLD VENIPUNCTURE: CPT

## 2024-08-30 PROCEDURE — 85025 COMPLETE CBC W/AUTO DIFF WBC: CPT

## 2024-09-03 LAB
FAX TO NUMBER: NORMAL
TEST RESULTS FAXED TO: NORMAL

## 2024-10-09 ENCOUNTER — HOSPITAL ENCOUNTER (OUTPATIENT)
Facility: HOSPITAL | Age: 64
Discharge: HOME | End: 2024-10-12
Attending: ORTHOPAEDIC SURGERY
Payer: MEDICARE
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
## 2024-10-09 DIAGNOSIS — M54.16 LUMBAR RADICULITIS: ICD-10-CM

## 2024-10-09 PROCEDURE — 72148 MRI LUMBAR SPINE W/O DYE: CPT

## 2024-11-18 ENCOUNTER — TRANSCRIBE ORDERS (OUTPATIENT)
Facility: HOSPITAL | Age: 64
End: 2024-11-18

## 2024-11-18 DIAGNOSIS — Z12.31 OTHER SCREENING MAMMOGRAM: Primary | ICD-10-CM

## 2024-12-10 ENCOUNTER — HOSPITAL ENCOUNTER (OUTPATIENT)
Facility: HOSPITAL | Age: 64
Discharge: HOME | End: 2024-12-13
Payer: MEDICARE

## 2024-12-10 VITALS — WEIGHT: 190.04 LBS | BODY MASS INDEX: 32.44 KG/M2 | HEIGHT: 64 IN

## 2024-12-10 DIAGNOSIS — Z12.31 OTHER SCREENING MAMMOGRAM: ICD-10-CM

## 2024-12-10 PROCEDURE — 77063 BREAST TOMOSYNTHESIS BI: CPT

## 2025-07-30 ENCOUNTER — OFFICE VISIT (OUTPATIENT)
Age: 65
End: 2025-07-30

## 2025-07-30 VITALS — HEIGHT: 64 IN | BODY MASS INDEX: 23.56 KG/M2 | WEIGHT: 138 LBS

## 2025-07-30 DIAGNOSIS — M65.352 TRIGGER LITTLE FINGER OF LEFT HAND: ICD-10-CM

## 2025-07-30 DIAGNOSIS — G56.02 LEFT CARPAL TUNNEL SYNDROME: Primary | ICD-10-CM

## 2025-07-30 RX ORDER — LIDOCAINE HYDROCHLORIDE 10 MG/ML
0.5 INJECTION, SOLUTION INFILTRATION; PERINEURAL ONCE
Status: COMPLETED | OUTPATIENT
Start: 2025-07-30 | End: 2025-07-30

## 2025-07-30 RX ADMIN — LIDOCAINE HYDROCHLORIDE 0.5 ML: 10 INJECTION, SOLUTION INFILTRATION; PERINEURAL at 11:41
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
## (undated) DEVICE — CATH URETH INTMIT ROB 12FR FUN -- CONVERT TO ITEM 151713

## (undated) DEVICE — URETERAL ACCESS SHEATH SET: Brand: NAVIGATOR HD

## (undated) DEVICE — Z DUP USE 2565107 PACK SURG PROC LEG CYSTO T-DRAPE REINF TBL CVR HND TWL

## (undated) DEVICE — CATHETER URETH 16FR BLLN 5CC SIL ALLY W/ SIL HYDRGEL 2 W F

## (undated) DEVICE — SOLUTION IRRIG 3000ML 0.9% SOD CHL FLX CONT 0797208] ICU MEDICAL INC]

## (undated) DEVICE — NITINOL STONE RETRIEVAL BASKET: Brand: ZERO TIP

## (undated) DEVICE — NEEDLE NRV BLK 21GA L4IN 30DEG INSUL BVL EXTN SET STIMUPLEX

## (undated) DEVICE — PREP SKN CHLRAPRP APL 26ML STR --

## (undated) DEVICE — SYR 10ML LUER LOK 1/5ML GRAD --

## (undated) DEVICE — PACK PROCEDURE SURG ORTH SHLDR CUST

## (undated) DEVICE — KENDALL SCD EXPRESS SLEEVES, KNEE LENGTH, MEDIUM: Brand: KENDALL SCD

## (undated) DEVICE — SUTURE FIBERWIRE SZ 2 W/ TAPERED NEEDLE BLUE L38IN NONABSORB BLU L26.5MM 1/2 CIRCLE AR7200

## (undated) DEVICE — GDWIRE UROL STR 150CM FLX TP -- BX/5 SENSOR

## (undated) DEVICE — SHOULDER SUSPENSION KIT 6 PER BOX

## (undated) DEVICE — DUAL LUMEN URETERAL CATHETER

## (undated) DEVICE — STER SINGLE BASIN SET W/BOWLS: Brand: CARDINAL HEALTH

## (undated) DEVICE — KIT CLN UP BON SECOURS MARYV

## (undated) DEVICE — DRAPE,REIN 53X77,STERILE: Brand: MEDLINE

## (undated) DEVICE — BLANKET WRM W40.2XL55.9IN IORT LO BODY + MISTRAL AIR

## (undated) DEVICE — APPLICATOR BNDG 1MM ADH PREMIERPRO EXOFIN

## (undated) DEVICE — BRACE SHLDR M FA L135 145IN UNIV AIRMESH BRTH SLNG 15DEG

## (undated) DEVICE — 90-S MAX, SUCTION PROBE, NON-BENDABLE, MAX CUT LEVEL 11: Brand: SERFAS ENERGY

## (undated) DEVICE — BLANKET WRM AD PREM MISTRAL-AIR

## (undated) DEVICE — GOWN,PREVENTION PLUS,XLN/XL,ST,24/CS: Brand: MEDLINE

## (undated) DEVICE — SUTURE MCRYL SZ 4-0 L18IN ABSRB UD L19MM PS-2 3/8 CIR PRIM Y496G

## (undated) DEVICE — SKIN MARKER,REGULAR TIP WITH RULER AND LABELS: Brand: DEVON

## (undated) DEVICE — CANNULA THREADED FLEX 8.0 X 72MM: Brand: CLEAR-TRAC

## (undated) DEVICE — SUTURE ABSORBABLE BRAIDED 0 CTXB 36 IN VIO PDS II ZB370

## (undated) DEVICE — STERILE POLYISOPRENE POWDER-FREE SURGICAL GLOVES: Brand: PROTEXIS

## (undated) DEVICE — SUTURE VCRL SZ 3-0 L27IN ABSRB UD L36MM CT-1 1/2 CIR J258H

## (undated) DEVICE — INFLOW CASSETTE TUBING, DO NOT USE IF PACKAGE IS DAMAGED: Brand: CROSSFLOW

## (undated) DEVICE — SUTURE FIBERLINK SZ 2-0 L26IN NONABSORBABLE BLU CLS LOOP AR7235

## (undated) DEVICE — BAG DRAINAGE CUST DISP

## (undated) DEVICE — 4-PORT MANIFOLD: Brand: NEPTUNE 2

## (undated) DEVICE — MEDI-VAC NON-CONDUCTIVE SUCTION TUBING: Brand: CARDINAL HEALTH

## (undated) DEVICE — PAD,NON-ADHERENT,3X8,STERILE,LF,1/PK: Brand: MEDLINE

## (undated) DEVICE — 272U LASER FIBER PL: Brand: HTB-272-PL

## (undated) DEVICE — TUBING IRRIG L77IN DIA0.241IN L BOR FOR CYSTO W/ NVENT

## (undated) DEVICE — NEEDLE SUT PASS FOR ROT CUF LABRAL REP MULTFI SCORPION

## (undated) DEVICE — BUR SHV CUT HLLW 6 FLUT 5.5MM --

## (undated) DEVICE — GARMENT,MEDLINE,DVT,INT,CALF,FOAM,MED: Brand: MEDLINE

## (undated) DEVICE — SPONGE LAP 18X18IN STRL -- 5/PK

## (undated) DEVICE — SOLUTION IV 1000ML 0.9% SOD CHL

## (undated) DEVICE — 3M™ BAIR PAWS FLEX™ WARMING GOWN, STANDARD, 20 PER CASE 81003: Brand: BAIR PAWS™

## (undated) DEVICE — GAUZE SPONGES,16 PLY: Brand: CURITY

## (undated) DEVICE — BLADE SHV DIA4MM RED RESECT FOR GEN DEB REM OF PERIOST FR

## (undated) DEVICE — LUB SURG MEDC STRL 2OZ TUBE MC -- MEDICHOICE

## (undated) DEVICE — TRAY PREP DRY W/ PREM GLV 2 APPL 6 SPNG 2 UNDPD 1 OVERWRAP

## (undated) DEVICE — THERMEDX UROLOGY SET